# Patient Record
Sex: MALE | Race: WHITE | NOT HISPANIC OR LATINO | Employment: FULL TIME | URBAN - METROPOLITAN AREA
[De-identification: names, ages, dates, MRNs, and addresses within clinical notes are randomized per-mention and may not be internally consistent; named-entity substitution may affect disease eponyms.]

---

## 2017-01-04 ENCOUNTER — HOSPITAL ENCOUNTER (OUTPATIENT)
Dept: RADIOLOGY | Facility: HOSPITAL | Age: 42
Discharge: HOME | End: 2017-01-04
Attending: ORTHOPAEDIC SURGERY
Payer: COMMERCIAL

## 2017-01-04 DIAGNOSIS — M25.559 PAIN IN HIP: ICD-10-CM

## 2017-01-04 DIAGNOSIS — M21.70 ACQUIRED UNEQUAL LIMB LENGTH: ICD-10-CM

## 2017-01-04 PROCEDURE — 73502 X-RAY EXAM HIP UNI 2-3 VIEWS: CPT

## 2017-01-11 ENCOUNTER — TRANSCRIBE ORDERS (OUTPATIENT)
Dept: ADMINISTRATIVE | Facility: HOSPITAL | Age: 42
End: 2017-01-11

## 2017-01-11 DIAGNOSIS — M25.559 ARTHRALGIA OF HIP, UNSPECIFIED LATERALITY: Primary | ICD-10-CM

## 2017-01-19 ENCOUNTER — HOSPITAL ENCOUNTER (OUTPATIENT)
Dept: CT IMAGING | Facility: HOSPITAL | Age: 42
Discharge: HOME | End: 2017-01-19
Attending: ORTHOPAEDIC SURGERY
Payer: COMMERCIAL

## 2017-01-19 DIAGNOSIS — M25.559 PAIN IN HIP: ICD-10-CM

## 2017-01-19 PROCEDURE — 77073 BONE LENGTH STUDIES: CPT

## 2017-01-25 ENCOUNTER — APPOINTMENT (OUTPATIENT)
Dept: PHYSICAL THERAPY | Facility: CLINIC | Age: 42
End: 2017-01-25
Payer: COMMERCIAL

## 2017-01-25 DIAGNOSIS — M21.70 ACQUIRED UNEQUAL LIMB LENGTH: ICD-10-CM

## 2017-01-25 DIAGNOSIS — M25.559 PAIN IN HIP: ICD-10-CM

## 2017-01-25 PROCEDURE — 97162 PT EVAL MOD COMPLEX 30 MIN: CPT

## 2017-01-25 PROCEDURE — 97110 THERAPEUTIC EXERCISES: CPT

## 2017-01-25 PROCEDURE — L3010 FOOT LONGITUDINAL ARCH SUPPO: HCPCS

## 2017-01-30 ENCOUNTER — APPOINTMENT (OUTPATIENT)
Dept: PHYSICAL THERAPY | Facility: CLINIC | Age: 42
End: 2017-01-30
Payer: COMMERCIAL

## 2017-02-01 ENCOUNTER — GENERIC CONVERSION - ENCOUNTER (OUTPATIENT)
Dept: OTHER | Facility: OTHER | Age: 42
End: 2017-02-01

## 2017-02-03 ENCOUNTER — APPOINTMENT (OUTPATIENT)
Dept: PHYSICAL THERAPY | Facility: CLINIC | Age: 42
End: 2017-02-03
Payer: COMMERCIAL

## 2017-02-03 PROCEDURE — 97140 MANUAL THERAPY 1/> REGIONS: CPT

## 2017-02-06 ENCOUNTER — APPOINTMENT (OUTPATIENT)
Dept: PHYSICAL THERAPY | Facility: CLINIC | Age: 42
End: 2017-02-06
Payer: COMMERCIAL

## 2017-02-06 PROCEDURE — 97110 THERAPEUTIC EXERCISES: CPT

## 2017-02-06 PROCEDURE — 97014 ELECTRIC STIMULATION THERAPY: CPT

## 2017-02-06 PROCEDURE — 97140 MANUAL THERAPY 1/> REGIONS: CPT

## 2017-02-06 PROCEDURE — G0283 ELEC STIM OTHER THAN WOUND: HCPCS

## 2017-02-10 ENCOUNTER — APPOINTMENT (OUTPATIENT)
Dept: PHYSICAL THERAPY | Facility: CLINIC | Age: 42
End: 2017-02-10
Payer: COMMERCIAL

## 2017-02-10 PROCEDURE — 97140 MANUAL THERAPY 1/> REGIONS: CPT

## 2017-02-10 PROCEDURE — 97110 THERAPEUTIC EXERCISES: CPT

## 2017-02-10 PROCEDURE — 97014 ELECTRIC STIMULATION THERAPY: CPT

## 2017-02-10 PROCEDURE — G0283 ELEC STIM OTHER THAN WOUND: HCPCS

## 2017-02-13 ENCOUNTER — APPOINTMENT (OUTPATIENT)
Dept: PHYSICAL THERAPY | Facility: CLINIC | Age: 42
End: 2017-02-13
Payer: COMMERCIAL

## 2017-02-17 ENCOUNTER — APPOINTMENT (OUTPATIENT)
Dept: PHYSICAL THERAPY | Facility: CLINIC | Age: 42
End: 2017-02-17
Payer: COMMERCIAL

## 2017-02-17 PROCEDURE — 97110 THERAPEUTIC EXERCISES: CPT

## 2017-02-17 PROCEDURE — 97014 ELECTRIC STIMULATION THERAPY: CPT

## 2017-02-17 PROCEDURE — G0283 ELEC STIM OTHER THAN WOUND: HCPCS

## 2017-02-17 PROCEDURE — 97140 MANUAL THERAPY 1/> REGIONS: CPT

## 2017-02-20 ENCOUNTER — APPOINTMENT (OUTPATIENT)
Dept: PHYSICAL THERAPY | Facility: CLINIC | Age: 42
End: 2017-02-20
Payer: COMMERCIAL

## 2017-02-20 PROCEDURE — 97014 ELECTRIC STIMULATION THERAPY: CPT

## 2017-02-20 PROCEDURE — 97110 THERAPEUTIC EXERCISES: CPT

## 2017-02-20 PROCEDURE — 97140 MANUAL THERAPY 1/> REGIONS: CPT

## 2017-02-20 PROCEDURE — G0283 ELEC STIM OTHER THAN WOUND: HCPCS

## 2017-02-24 ENCOUNTER — APPOINTMENT (OUTPATIENT)
Dept: PHYSICAL THERAPY | Facility: CLINIC | Age: 42
End: 2017-02-24
Payer: COMMERCIAL

## 2017-02-24 PROCEDURE — 97110 THERAPEUTIC EXERCISES: CPT

## 2017-02-24 PROCEDURE — 97014 ELECTRIC STIMULATION THERAPY: CPT

## 2017-02-24 PROCEDURE — 97140 MANUAL THERAPY 1/> REGIONS: CPT

## 2017-02-24 PROCEDURE — G0283 ELEC STIM OTHER THAN WOUND: HCPCS

## 2017-02-27 ENCOUNTER — APPOINTMENT (OUTPATIENT)
Dept: PHYSICAL THERAPY | Facility: CLINIC | Age: 42
End: 2017-02-27
Payer: COMMERCIAL

## 2017-03-03 ENCOUNTER — APPOINTMENT (OUTPATIENT)
Dept: PHYSICAL THERAPY | Facility: CLINIC | Age: 42
End: 2017-03-03
Payer: COMMERCIAL

## 2017-03-03 PROCEDURE — 97110 THERAPEUTIC EXERCISES: CPT

## 2017-03-03 PROCEDURE — 97140 MANUAL THERAPY 1/> REGIONS: CPT

## 2017-03-06 ENCOUNTER — APPOINTMENT (OUTPATIENT)
Dept: PHYSICAL THERAPY | Facility: CLINIC | Age: 42
End: 2017-03-06
Payer: COMMERCIAL

## 2017-03-10 ENCOUNTER — APPOINTMENT (OUTPATIENT)
Dept: PHYSICAL THERAPY | Facility: CLINIC | Age: 42
End: 2017-03-10
Payer: COMMERCIAL

## 2017-03-10 PROCEDURE — 97110 THERAPEUTIC EXERCISES: CPT

## 2017-03-10 PROCEDURE — 97140 MANUAL THERAPY 1/> REGIONS: CPT

## 2017-03-20 ENCOUNTER — APPOINTMENT (OUTPATIENT)
Dept: PHYSICAL THERAPY | Facility: CLINIC | Age: 42
End: 2017-03-20
Payer: COMMERCIAL

## 2017-03-24 ENCOUNTER — APPOINTMENT (OUTPATIENT)
Dept: PHYSICAL THERAPY | Facility: CLINIC | Age: 42
End: 2017-03-24
Payer: COMMERCIAL

## 2017-03-27 ENCOUNTER — APPOINTMENT (OUTPATIENT)
Dept: PHYSICAL THERAPY | Facility: CLINIC | Age: 42
End: 2017-03-27
Payer: COMMERCIAL

## 2017-03-28 ENCOUNTER — ALLSCRIPTS OFFICE VISIT (OUTPATIENT)
Dept: OTHER | Facility: OTHER | Age: 42
End: 2017-03-28

## 2017-03-31 ENCOUNTER — APPOINTMENT (OUTPATIENT)
Dept: PHYSICAL THERAPY | Facility: CLINIC | Age: 42
End: 2017-03-31
Payer: COMMERCIAL

## 2017-08-04 ENCOUNTER — TRANSCRIBE ORDERS (OUTPATIENT)
Dept: LAB | Facility: HOSPITAL | Age: 42
End: 2017-08-04

## 2017-08-04 ENCOUNTER — APPOINTMENT (OUTPATIENT)
Dept: LAB | Facility: HOSPITAL | Age: 42
End: 2017-08-04
Payer: COMMERCIAL

## 2017-08-04 DIAGNOSIS — Z00.8 ENCOUNTER FOR OTHER GENERAL EXAMINATION: ICD-10-CM

## 2017-08-04 DIAGNOSIS — Z00.8 ENCOUNTER FOR OTHER GENERAL EXAMINATION: Primary | ICD-10-CM

## 2017-08-04 LAB
CHOLEST SERPL-MCNC: 156 MG/DL (ref 50–200)
EST. AVERAGE GLUCOSE BLD GHB EST-MCNC: 117 MG/DL
HBA1C MFR BLD: 5.7 % (ref 4.2–6.3)
HDLC SERPL-MCNC: 59 MG/DL (ref 40–60)
LDLC SERPL CALC-MCNC: 84 MG/DL (ref 0–100)
TRIGL SERPL-MCNC: 63 MG/DL

## 2017-08-04 PROCEDURE — 83036 HEMOGLOBIN GLYCOSYLATED A1C: CPT

## 2017-08-04 PROCEDURE — 36415 COLL VENOUS BLD VENIPUNCTURE: CPT

## 2017-08-04 PROCEDURE — 80061 LIPID PANEL: CPT

## 2017-10-04 ENCOUNTER — TRANSCRIBE ORDERS (OUTPATIENT)
Dept: ADMINISTRATIVE | Facility: HOSPITAL | Age: 42
End: 2017-10-04

## 2017-10-04 DIAGNOSIS — E04.1 NONTOXIC UNINODULAR GOITER: Primary | ICD-10-CM

## 2017-10-06 ENCOUNTER — HOSPITAL ENCOUNTER (OUTPATIENT)
Dept: RADIOLOGY | Facility: HOSPITAL | Age: 42
Discharge: HOME | End: 2017-10-06
Attending: SPECIALIST
Payer: COMMERCIAL

## 2017-10-06 DIAGNOSIS — E04.1 NONTOXIC UNINODULAR GOITER: ICD-10-CM

## 2017-10-06 PROCEDURE — 76536 US EXAM OF HEAD AND NECK: CPT

## 2017-10-31 ENCOUNTER — HOSPITAL ENCOUNTER (OUTPATIENT)
Dept: RADIOLOGY | Facility: HOSPITAL | Age: 42
Discharge: HOME | End: 2017-10-31
Payer: COMMERCIAL

## 2017-10-31 DIAGNOSIS — R07.81 PLEURODYNIA: ICD-10-CM

## 2017-10-31 PROCEDURE — 71101 X-RAY EXAM UNILAT RIBS/CHEST: CPT

## 2018-01-12 NOTE — RESULT NOTES
Verified Results  (1) MAGNESIUM [id number] 07:10AM Vidya Browning     Test Name Result Flag Reference   MAGNESIUM 2 3 mg/dL  1 6-2 6     (1) BASIC METABOLIC PROFILE [id number] 07:10AM Vidya Browning     Test Name Result Flag Reference   GLUCOSE,RANDM 90 mg/dL     If the patient is fasting, the ADA then defines impaired fasting glucose as > 100 mg/dL and diabetes as > or equal to 123 mg/dL  SODIUM 138 mmol/L  136-145   POTASSIUM 4 3 mmol/L  3 5-5 3   CHLORIDE 104 mmol/L  100-108   CARBON DIOXIDE 30 mmol/L  21-32   ANION GAP (CALC) 4 mmol/L  4-13   BLOOD UREA NITROGEN 16 mg/dL  5-25   CREATININE 0 96 mg/dL  0 60-1 30   Standardized to IDMS reference method   CALCIUM 8 9 mg/dL  8 3-10 1   eGFR Non-African American      >60 0 ml/min/1 73[facility] Disease Education Program recommendations are as follows:  GFR calculation is accurate only with a steady state creatinine  Chronic Kidney disease less than 60 ml/min/1 73 sq  meters  Kidney failure less than 15 ml/min/1 73 sq  meters  (1) TSH [id number] 07:10AM Vidya Browning     Test Name Result Flag Reference   TSH 1 800 uIU/mL  0 358-3 740   Patients undergoing fluorescein dye angiography may retain small amounts of fluorescein in the body for 48-72 hours post procedure  Samples containing fluorescein can produce falsely depressed TSH values  If the patient had this procedure,a specimen should be resubmitted post fluorescein clearance

## 2018-01-13 NOTE — RESULT NOTES
Verified Results  (1) TISSUE EXAM 20Oct2016 02:55PM Renetta Olivera     Test Name Result Flag Reference   LAB AP CASE REPORT (Report)     Surgical Pathology Report             Case: [id number]                   Authorizing Provider: Ephraim Miller MD       Collected:      10/20/2016 1455        Ordering Location:   [address] [place] [address]   Received:      10/21/2016 [address] Endoscopy                               Pathologist:      Christine Suazo MD                                 Specimens:  A) - Duodenum, r/o celiac                                       B) - Stomach, gastric body r/o h pylori                                C) - Esophagus, random r/o eosinophillic esophagitis   LAB AP FINAL DIAGNOSIS (Report)     A  Duodenum (biopsy):    - Small bowel mucosa with no significant pathologic abnormalities  - No villous atrophy, increased intraepithelial lymphocytes or crypt   hyperplasia to suggest     malabsorptive enteropathy     - No active inflammation, granulomas, organisms, dysplasia or neoplasia   identified  B  Gastric body (biopsy):    - Gastric oxyntic and foveolar mucosa with no significant pathologic   abnormality      - Immunostain for H  pylori (with appropriate positive control) is   negative  - No intestinal metaplasia, dysplasia or neoplasia identified  C  Esophagus (biopsy):    - Predominately squamous mucosa and scant gastric mucosa  - Squamous eosinophils number less than 2 per HPF    - No intestinal metaplasia, dysplasia or neoplasia identified  Electronically signed by Christine Suazo MD on 10/25/2016 at 3:42 PM   LAB AP NOTE      Interpretation performed at [facility], [address] [address]  LAB AP SURGICAL ADDITIONAL INFORMATION (Report)     These tests were developed and their performance characteristics   determined by [facility] or [facility]  They may not be cleared or approved by the [place]   Food and   Drug Administration  The FDA has determined that such clearance or   approval is not necessary  These tests are used for clinical purposes  They should not be regarded as investigational or for research  This   laboratory has been approved by CLIA 88, designated as a high-complexity   laboratory and is qualified to perform these tests  LAB AP GROSS DESCRIPTION (Report)     A  The specimen is received in formalin, labeled with the patient's name   and hospital number, and is designated duodenal biopsy, rule out   celiac  The specimen consists of 2 white to tan soft tissue fragments   each measuring 0 4 centimeters in greatest dimension  Entirely submitted  One cassette  B  The specimen is received in formalin, labeled with the patient's name   and hospital number, and is designated Gastric body biopsy, rule out H    Pylori  The specimen consists of 3 white to tan soft tissue fragments   measuring 0 2, 0 3 and 0 6 centimeters in greatest dimension  Entirely   submitted  One cassette  C  The specimen is received in formalin, labeled with the patient's name   and hospital number, and is designated Esophageal biopsy, rule out   eosinophilic esophagitis  The specimen consists of multiple clear to   white to tan soft tissue fragments measuring in aggregate 0 8 x 0 5 x 0 2   cm  Entirely submitted  One cassette  Note: The estimated total formalin fixation time based upon information   provided by the submitting clinician and the standard processing schedule   is 37 5 hours        ACL   LAB AP CLINICAL INFORMATION None provided

## 2018-01-14 VITALS
HEART RATE: 92 BPM | HEIGHT: 67 IN | SYSTOLIC BLOOD PRESSURE: 110 MMHG | BODY MASS INDEX: 23.09 KG/M2 | OXYGEN SATURATION: 98 % | TEMPERATURE: 96.5 F | WEIGHT: 147.13 LBS | DIASTOLIC BLOOD PRESSURE: 74 MMHG

## 2018-07-26 ENCOUNTER — HOSPITAL ENCOUNTER (EMERGENCY)
Facility: HOSPITAL | Age: 43
Discharge: HOME | End: 2018-07-26
Attending: EMERGENCY MEDICINE
Payer: OTHER MISCELLANEOUS

## 2018-07-26 VITALS
SYSTOLIC BLOOD PRESSURE: 124 MMHG | BODY MASS INDEX: 23.3 KG/M2 | WEIGHT: 145 LBS | OXYGEN SATURATION: 99 % | TEMPERATURE: 96.7 F | DIASTOLIC BLOOD PRESSURE: 72 MMHG | RESPIRATION RATE: 20 BRPM | HEART RATE: 80 BPM | HEIGHT: 66 IN

## 2018-07-26 DIAGNOSIS — T59.91XA INHALATION OF NOXIOUS FUMES, ACCIDENTAL OR UNINTENTIONAL, INITIAL ENCOUNTER: Primary | ICD-10-CM

## 2018-07-26 PROCEDURE — 99283 EMERGENCY DEPT VISIT LOW MDM: CPT

## 2018-07-26 RX ORDER — ACETAMINOPHEN 325 MG/1
975 TABLET ORAL ONCE
Status: COMPLETED | OUTPATIENT
Start: 2018-07-26 | End: 2018-07-26

## 2018-07-26 RX ADMIN — ACETAMINOPHEN 975 MG: 325 TABLET, FILM COATED ORAL at 12:29

## 2018-07-26 NOTE — ED PROVIDER NOTES
History  Chief Complaint   Patient presents with    Headache     patient presents to the ED with c/o headache after a  pipe was opened at work  Patient c/o Headache and nausea  Patient complains of general headache, nausea, dizzy since being exposed to septic fumes for about 1 hour while working in [address] [place] [address] here at [facility] just prior to arrival             Prior to Admission Medications   Prescriptions Last Dose Informant Patient Reported? Taking? Multiple Vitamin (MULTIVITAMIN) tablet   Yes No   Sig: Take 1 tablet by mouth daily  Facility-Administered Medications: None       Past Medical History:   Diagnosis Date    GERD (gastroesophageal reflux disease)     PVC (premature ventricular contraction) resolved    Tenosynovitis of wrist        Past Surgical History:   Procedure Laterality Date    ESOPHAGOGASTRODUODENOSCOPY N/A 10/20/2016    Procedure: ESOPHAGOGASTRODUODENOSCOPY (EGD); Surgeon: Nevin Xavier MD;  [facility]; Service:     VASECTOMY         History reviewed  No pertinent family history  I have reviewed and agree with the history as documented  Social History   Substance Use Topics    Smoking status: Never Smoker    Smokeless tobacco: Never Used    Alcohol use Yes      Comment: occasional wine        Review of Systems   All other systems reviewed and are negative  Physical Exam  Physical Exam   Constitutional: He appears well-developed and well-nourished  HENT:   Mouth/Throat: Oropharynx is clear and moist    Eyes: Conjunctivae and EOM are normal  Pupils are equal, round, and reactive to light  Neck: Normal range of motion  Neck supple  No spinous process tenderness present  Cardiovascular: Normal rate, regular rhythm, normal heart sounds and intact distal pulses  Pulmonary/Chest: Effort normal and breath sounds normal  No respiratory distress  He has no wheezes  Abdominal: Soft  Bowel sounds are normal  He exhibits no distension  There is no tenderness  Musculoskeletal: Normal range of motion  Neurological: He is alert  He has normal strength  No sensory deficit  GCS eye subscore is 4  GCS verbal subscore is 5  GCS motor subscore is 6  Skin: Skin is warm and dry  No rash noted  Psychiatric: He has a normal mood and affect  Nursing note and vitals reviewed        Vital Signs  ED Triage Vitals [07/26/18 1200]   Temperature Pulse Respirations Blood Pressure SpO2   (!) 96 7 °F (35 9 °C) 85 18 141/78 98 %      Temp src Heart Rate Source Patient Position - Orthostatic VS BP Location FiO2 (%)   -- Monitor Sitting Right arm --      Pain Score       3           Vitals:    07/26/18 1200 07/26/18 1246   BP: 141/78 124/72   Pulse: 85 80   Patient Position - Orthostatic VS: Sitting Sitting       Visual Acuity  Visual Acuity      Most Recent Value   L Pupil Size (mm)  3   R Pupil Size (mm)  3          ED Medications  Medications   acetaminophen (TYLENOL) tablet 975 mg (975 mg Oral Given 7/26/18 1229)       Diagnostic Studies  Results Reviewed     None                 No orders to display              Procedures  Procedures       Phone Contacts  ED Phone Contact    ED Course  ED Course as of Jul 26 1653   Thu Jul 26, 2018   1237 Still feels punky but better                                MDM  Number of Diagnoses or Management Options  Inhalation of noxious fumes, accidental or unintentional, initial encounter: new and does not require workup  Patient Progress  Patient progress: improved    CritCare Time    Disposition  Final diagnoses:   Inhalation of noxious fumes, accidental or unintentional, initial encounter     Time reflects when diagnosis was documented in both MDM as applicable and the Disposition within this note     Time User Action Codes Description Comment    7/26/2018 12:38 PM Melvin Ortega Add [T59 [id number]] Inhalation of noxious fumes, accidental or unintentional, initial encounter       ED Disposition     ED Disposition Condition Comment    Discharge  Dearing Sor [id number] [id number] discharge to home/self care  Condition at discharge: Good        Follow-up Information    None         Discharge Medication List as of 7/26/2018 12:40 PM      CONTINUE these medications which have NOT CHANGED    Details   Multiple Vitamin (MULTIVITAMIN) tablet Take 1 tablet by mouth daily  , Until Discontinued, Historical Med           No discharge procedures on file      ED Provider  Electronically Signed by           Linda Vieira DO  07/26/18 6641

## 2018-07-26 NOTE — DISCHARGE INSTRUCTIONS
Acute Headache   WHAT YOU NEED TO KNOW:   What is an acute headache? An acute headache is pain or discomfort that starts suddenly and gets worse quickly  You may have an acute headache only when you feel stress or eat certain foods  Other acute headache pain can happen every day, and sometimes several times a day  What are the most common types of acute headache? · Tension headache  is the most common type of headache  These headaches typically occur in the late afternoon and go away by evening  The pain is usually mild or moderate  You may have problems tolerating bright light or loud noise  The pain is usually across the forehead or in the back of the head, often only on one side  These headaches may occur every day  · Migraine headaches  cause moderate or severe pain  The headache generally lasts from 1 to 3 days and tends to come back  Pain is usually on only one side, but it may change sides  Migraines often occur in the temple, the back of the head, or behind the eye  The pain may throb or be sharp and steady  · A migraine with aura  means you see or feel something before a migraine  You may see a small spot surrounded by bright zigzag lines  Other signs or symptoms may follow the aura  · Cluster headache  pain is usually only on one side  It often causes severe pain, and can last for 30 minutes to 2 hours  These headaches may occur 1 or 2 times each day, more often at night  The pain may wake you  What causes acute headaches? The cause of your headache may not be known   The following conditions can trigger a headache:  · Stress or tension, hours or even days after stressful events    · Fatigue, a lack of sleep or changes in your usual sleep pattern, or a nap during the day    · Menstruation, especially after pregnancy, or use of birth control pills or hormone replacement therapy    · Food such as cured meats, artificial sweeteners, alcohol, dark chocolate, and MSG     · Suddenly not having caffeine if you usually have larger amounts    · A medical problem, such as an infection, tooth pain, neck or sinus pain, thyroid problems, or a tumor    · A head injury  How is the type of acute headache diagnosed and treated? Your healthcare provider will ask you to describe your pain and rate it on a scale from 1 to 10  Tell the provider how often you have headaches and how long they last  Also describe any other symptoms you have along with headaches, such as dizziness or blurred vision  · Medicines  may be given to manage or prevent headaches  The medicine will depend on the type of acute headache you have  Do not wait until the pain is severe before you take your medicine  You may be able to take over-the-counter pain medicines as needed  Examples include NSAIDs and acetaminophen  Ask your healthcare provider which medicine is right for you  Ask how much to take and when to take it  Follow directions  These medicines can cause stomach bleeding or kidney or liver damage if not taken correctly  · Biofeedback  may be used to help you manage stress  Electrodes (wires) are placed on your body and attached to a monitor  You will learn how to change stress reactions  For example, you learn to slow your heart rate when you become upset  · Cognitive behavior therapy,  or stress management, may be used with other therapies to prevent headaches  What can I do to manage my symptoms? · Apply heat or ice  on the headache area  Use a heat or ice pack  For an ice pack, you can also put crushed ice in a plastic bag  Cover the pack or bag with a towel before you apply it to your skin  Ice and heat both help decrease pain, and heat also helps decrease muscle spasms  Apply heat for 20 to 30 minutes every 2 hours  Apply ice for 15 to 20 minutes every hour  Apply heat or ice for as long and for as many days as directed  You may alternate heat and ice  · Relax your muscles    Lie down in a comfortable position and close your eyes  Relax your muscles slowly  Start at your toes and work your way up your body  · Keep a record of your headaches  Write down when your headaches start and stop  Include your symptoms and what you were doing when the headache began  Record what you ate or drank for 24 hours before the headache started  Describe the pain and where it hurts  Keep track of what you did to treat your headache and if it worked  What can I do to prevent an acute headache? · Avoid anything that triggers an acute headache  Examples include exposure to chemicals, going to high altitude, or not getting enough sleep  Create a regular sleep routine  Go to sleep at the same time and wake up at the same time each day  Do not use electronic devices before bedtime  These may trigger a headache or prevent you from sleeping well  · Do not smoke  Nicotine and other chemicals in cigarettes and cigars can trigger an acute headache or make it worse  Ask your healthcare provider for information if you currently smoke and need help to quit  E-cigarettes or smokeless tobacco still contain nicotine  Talk to your healthcare provider before you use these products  · Limit alcohol as directed  Alcohol can trigger an acute headache or make it worse  If you have cluster headaches, do not drink alcohol during an episode  For other types of headaches, ask your healthcare provider if it is safe for you to drink alcohol  Ask how much is safe for you to drink, and how often  · Exercise as directed  Exercise can reduce tension and help with headache pain  Aim for 30 minutes of physical activity on most days of the week  Your healthcare provider can help you create an exercise plan  · Eat a variety of healthy foods  Healthy foods include fruits, vegetables, low-fat dairy products, lean meats, fish, whole grains, and cooked beans   Your healthcare provider or dietitian can help you create meals plans if you need to avoid foods that trigger headaches  When should I seek immediate care? · You have severe pain  · You have numbness or weakness on one side of your face or body  · You have a headache that occurs after a blow to the head, a fall, or other trauma  · You have a headache, are forgetful or confused, or have trouble speaking  · You have a headache, stiff neck, and a fever  When should I contact my healthcare provider? · You have a constant headache and are vomiting  · You have a headache each day that does not get better, even after treatment  · You have changes in your headaches, or new symptoms that occur when you have a headache  · You have questions or concerns about your condition or care  CARE AGREEMENT:   You have the right to help plan your care  Learn about your health condition and how it may be treated  Discuss treatment options with your caregivers to decide what care you want to receive  You always have the right to refuse treatment  The above information is an  only  It is not intended as medical advice for individual conditions or treatments  Talk to your doctor, nurse or pharmacist before following any medical regimen to see if it is safe and effective for you  © 2017 [address] [facility] Information is for End User's use only and may not be sold, redistributed or otherwise used for commercial purposes  All illustrations and images included in CareNotes® are the copyrighted property of [employer]  or Augie Leal

## 2019-01-17 ENCOUNTER — TELEPHONE (OUTPATIENT)
Dept: FAMILY MEDICINE CLINIC | Facility: HOSPITAL | Age: 44
End: 2019-01-17

## 2019-01-18 DIAGNOSIS — Z13.6 SCREENING FOR CARDIOVASCULAR CONDITION: ICD-10-CM

## 2019-01-18 DIAGNOSIS — Z13.1 SCREENING FOR DIABETES MELLITUS: Primary | ICD-10-CM

## 2019-02-01 ENCOUNTER — TRANSCRIBE ORDERS (OUTPATIENT)
Dept: LAB | Facility: HOSPITAL | Age: 44
End: 2019-02-01

## 2019-02-01 ENCOUNTER — LAB (OUTPATIENT)
Dept: LAB | Facility: HOSPITAL | Age: 44
End: 2019-02-01
Attending: INTERNAL MEDICINE
Payer: COMMERCIAL

## 2019-02-01 DIAGNOSIS — Z13.6 SCREENING FOR CARDIOVASCULAR CONDITION: ICD-10-CM

## 2019-02-01 DIAGNOSIS — Z13.1 SCREENING FOR DIABETES MELLITUS: ICD-10-CM

## 2019-02-01 LAB
ALBUMIN SERPL BCP-MCNC: 3.9 G/DL (ref 3.5–5)
ALP SERPL-CCNC: 76 U/L (ref 46–116)
ALT SERPL W P-5'-P-CCNC: 26 U/L (ref 12–78)
ANION GAP SERPL CALCULATED.3IONS-SCNC: 4 MMOL/L (ref 4–13)
AST SERPL W P-5'-P-CCNC: 16 U/L (ref 5–45)
BILIRUB SERPL-MCNC: 0.57 MG/DL (ref 0.2–1)
BUN SERPL-MCNC: 14 MG/DL (ref 5–25)
CALCIUM SERPL-MCNC: 8.8 MG/DL (ref 8.3–10.1)
CHLORIDE SERPL-SCNC: 104 MMOL/L (ref 100–108)
CHOLEST SERPL-MCNC: 145 MG/DL (ref 50–200)
CO2 SERPL-SCNC: 28 MMOL/L (ref 21–32)
CREAT SERPL-MCNC: 0.94 MG/DL (ref 0.6–1.3)
GFR SERPL CREATININE-BSD FRML MDRD: 100 ML/MIN/1.73SQ M
GLUCOSE P FAST SERPL-MCNC: 88 MG/DL (ref 65–99)
HDLC SERPL-MCNC: 50 MG/DL (ref 40–60)
LDLC SERPL CALC-MCNC: 83 MG/DL (ref 0–100)
NONHDLC SERPL-MCNC: 95 MG/DL
POTASSIUM SERPL-SCNC: 4 MMOL/L (ref 3.5–5.3)
PROT SERPL-MCNC: 7.6 G/DL (ref 6.4–8.2)
SODIUM SERPL-SCNC: 136 MMOL/L (ref 136–145)
TRIGL SERPL-MCNC: 58 MG/DL

## 2019-02-01 PROCEDURE — 80053 COMPREHEN METABOLIC PANEL: CPT

## 2019-02-01 PROCEDURE — 80061 LIPID PANEL: CPT

## 2019-02-01 PROCEDURE — 36415 COLL VENOUS BLD VENIPUNCTURE: CPT

## 2019-02-14 ENCOUNTER — OFFICE VISIT (OUTPATIENT)
Dept: FAMILY MEDICINE CLINIC | Facility: HOSPITAL | Age: 44
End: 2019-02-14
Payer: COMMERCIAL

## 2019-02-14 VITALS
DIASTOLIC BLOOD PRESSURE: 74 MMHG | HEART RATE: 95 BPM | BODY MASS INDEX: 24.75 KG/M2 | OXYGEN SATURATION: 97 % | WEIGHT: 154 LBS | HEIGHT: 66 IN | SYSTOLIC BLOOD PRESSURE: 110 MMHG

## 2019-02-14 DIAGNOSIS — E04.1 THYROID NODULE: ICD-10-CM

## 2019-02-14 DIAGNOSIS — Z00.00 ANNUAL PHYSICAL EXAM: Primary | ICD-10-CM

## 2019-02-14 DIAGNOSIS — K21.9 GASTROESOPHAGEAL REFLUX DISEASE WITHOUT ESOPHAGITIS: ICD-10-CM

## 2019-02-14 PROBLEM — M21.70 LEG LENGTH DISCREPANCY: Status: ACTIVE | Noted: 2017-01-19

## 2019-02-14 PROCEDURE — 99396 PREV VISIT EST AGE 40-64: CPT | Performed by: INTERNAL MEDICINE

## 2019-02-14 RX ORDER — RANITIDINE 150 MG/1
1 TABLET ORAL 2 TIMES DAILY
COMMUNITY
Start: 2017-03-28 | End: 2020-01-29 | Stop reason: ALTCHOICE

## 2019-02-14 NOTE — PATIENT INSTRUCTIONS

## 2019-02-14 NOTE — PROGRESS NOTES
ADULT ANNUAL PHYSICAL  [facility]    NAME: Arabella Levi  AGE: 41 y o  SEX: male  : 1975     DATE: 2019     Assessment and Plan:     Problem List Items Addressed This Visit        Digestive    Gastroesophageal reflux disease without esophagitis     Will check cbc           Relevant Medications    ranitidine (ZANTAC) 150 mg tablet    Other Relevant Orders    Iron Saturation %    CBC       Endocrine    Thyroid nodule    Relevant Orders    TSH, 3rd generation with Free T4 reflex      Other Visit Diagnoses     Annual physical exam    -  Primary          Health maintenance and preventative care screenings were discussed with patient today  Appropriate education was printed on patient's after visit summary  · Discussed risks/benefits of screening for high cholesterol and diabetes  Patient is up-to-date with their preventive screenings  · Immunizations were reviewed: patient is up-to-date with his immunizations  Counseling:  · Dental Health: discussed importance of regular tooth brushing, flossing, and dental visits  No follow-ups on file  Chief Complaint:     Chief Complaint   Patient presents with    Annual Exam      History of Present Illness:     Adult Annual Physical   Patient here for a comprehensive physical exam  The patient reports feeling lightheaded after a large meal and uncomfortable feelinging in the legs at night twice a week that's better with movement  Diet and Physical Activity  · Diet/Nutrition: well balanced diet  · Weight concerns: none, patient's BMI is between 18 5-24 9  · Exercise: vigorous cardiovascular exercise        Depression Screening  PHQ-9 Depression Screening    PHQ-9:    Frequency of the following problems over the past two weeks:       Little interest or pleasure in doing things:  0 - not at all  Feeling down, depressed, or hopeless:  0 - not at all  PHQ-2 Score:  0       General Health  · Sleep: sleeps well  · Hearing: normal - bilateral   · Vision: wears glasses  · Dental: regular dental visits   Health  · Erectile dysfunction: no        Review of Systems:     Review of Systems   Constitutional: Negative for fever  HENT: Negative for congestion  Eyes: Negative for visual disturbance  Respiratory: Negative for cough and shortness of breath  Cardiovascular: Negative for chest pain, palpitations and leg swelling  Gastrointestinal: Negative for abdominal pain, blood in stool and diarrhea  Endocrine: Negative for polydipsia and polyphagia  Genitourinary: Negative for difficulty urinating and dysuria  Musculoskeletal: Negative for joint swelling, myalgias and neck stiffness  Skin: Negative for rash  Neurological: Positive for light-headedness  Negative for weakness, numbness and headaches  Hematological: Negative for adenopathy  Psychiatric/Behavioral: Negative for dysphoric mood  All other systems reviewed and are negative  Past Medical History:     Past Medical History:   Diagnosis Date    GERD (gastroesophageal reflux disease)     PVC (premature ventricular contraction) resolved    Tenosynovitis of wrist       Past Surgical History:     Past Surgical History:   Procedure Laterality Date    COLONOSCOPY      ESOPHAGOGASTRODUODENOSCOPY N/A 10/20/2016    Procedure: ESOPHAGOGASTRODUODENOSCOPY (EGD); Surgeon: Jose Valencia MD;  Location: [facility];   Service:     FEMUR SURGERY      Femur repair    VASECTOMY        Social History:     Social History     Socioeconomic History    Marital status: /Civil Union     Spouse name: None    Number of children: None    Years of education: None    Highest education level: None   Occupational History    None   Social Needs    Financial resource strain: None    Food insecurity:     Worry: None     Inability: None    Transportation needs:     Medical: None     Non-medical: None   Tobacco Use    Smoking status: Never Smoker    Smokeless tobacco: Never Used   Substance and Sexual Activity    Alcohol use: Yes     Comment: occasional wine    Drug use: No    Sexual activity: None   Lifestyle    Physical activity:     Days per week: None     Minutes per session: None    Stress: None   Relationships    Social connections:     Talks on phone: None     Gets together: None     Attends Spiritism service: None     Active member of club or organization: None     Attends meetings of clubs or organizations: None     Relationship status: None    Intimate partner violence:     Fear of current or ex partner: None     Emotionally abused: None     Physically abused: None     Forced sexual activity: None   Other Topics Concern    None   Social History Narrative    No alcohol use - As per [employer]     Wears seatbelt    Regular dental care    Limited exercise    No living will      Family History:     Family History   Problem Relation Age of Onset    Leukemia Mother     Corrales's esophagus Father     Coronary artery disease Father     Diabetes Father     Heart disease Father       Current Medications:     Current Outpatient Medications   Medication Sig Dispense Refill    Multiple Vitamin (MULTIVITAMIN) tablet Take 1 tablet by mouth daily   ranitidine (ZANTAC) 150 mg tablet Take 1 tablet by mouth 2 (two) times a day       No current facility-administered medications for this visit  Allergies: Allergies   Allergen Reactions    Amoxicillin-Pot Clavulanate GI Intolerance      Objective:     /74   Pulse 95   Ht 5' 6" (1 676 m)   Wt 69 9 kg (154 lb)   SpO2 97%   BMI 24 86 kg/m²     Physical Exam   Constitutional: He is oriented to person, place, and time  He appears well-developed and well-nourished  HENT:   Head: Normocephalic  Eyes: Conjunctivae are normal    Neck: Neck supple  Cardiovascular: Normal rate and regular rhythm  Pulmonary/Chest: No respiratory distress   He has no wheezes  He has no rales  Abdominal: There is no tenderness  Musculoskeletal: He exhibits no tenderness  Lymphadenopathy:     He has no cervical adenopathy  Neurological: He is alert and oriented to person, place, and time  No cranial nerve deficit  Skin: Skin is warm and dry  No erythema  Psychiatric: He has a normal mood and affect          Health Maintenance:     Health Maintenance   Topic Date Due    Depression Screening PHQ  1975    INFLUENZA VACCINE  07/01/2018    BMI: Adult  07/26/2019    DTaP,Tdap,and Td Vaccines (2 - Td) 04/28/2026    HEPATITIS B VACCINES  Aged Out     Immunization History   Administered Date(s) Administered    Tdap 04/28/2016       Mason Hardy MD  [address] [facility]

## 2019-04-16 DIAGNOSIS — M75.41 SUBACROMIAL IMPINGEMENT OF RIGHT SHOULDER: Primary | ICD-10-CM

## 2019-06-05 ENCOUNTER — TRANSCRIBE ORDERS (OUTPATIENT)
Dept: LAB | Facility: HOSPITAL | Age: 44
End: 2019-06-05

## 2019-06-05 ENCOUNTER — APPOINTMENT (OUTPATIENT)
Dept: LAB | Facility: HOSPITAL | Age: 44
End: 2019-06-05
Payer: COMMERCIAL

## 2019-06-05 ENCOUNTER — LAB (OUTPATIENT)
Dept: LAB | Facility: HOSPITAL | Age: 44
End: 2019-06-05
Attending: INTERNAL MEDICINE
Payer: COMMERCIAL

## 2019-06-05 DIAGNOSIS — Z00.8 HEALTH EXAMINATION IN POPULATION SURVEY: Primary | ICD-10-CM

## 2019-06-05 DIAGNOSIS — Z00.8 HEALTH EXAMINATION IN POPULATION SURVEY: ICD-10-CM

## 2019-06-05 DIAGNOSIS — E04.1 THYROID NODULE: ICD-10-CM

## 2019-06-05 DIAGNOSIS — K21.9 GASTROESOPHAGEAL REFLUX DISEASE WITHOUT ESOPHAGITIS: ICD-10-CM

## 2019-06-05 LAB
CHOLEST SERPL-MCNC: 149 MG/DL (ref 50–200)
ERYTHROCYTE [DISTWIDTH] IN BLOOD BY AUTOMATED COUNT: 15.8 % (ref 11.6–15.1)
EST. AVERAGE GLUCOSE BLD GHB EST-MCNC: 114 MG/DL
HBA1C MFR BLD: 5.6 % (ref 4.2–6.3)
HCT VFR BLD AUTO: 44 % (ref 36.5–49.3)
HDLC SERPL-MCNC: 55 MG/DL (ref 40–60)
HGB BLD-MCNC: 13.1 G/DL (ref 12–17)
IRON SATN MFR SERPL: 20 %
IRON SERPL-MCNC: 58 UG/DL (ref 65–175)
LDLC SERPL CALC-MCNC: 83 MG/DL (ref 0–100)
MCH RBC QN AUTO: 21.3 PG (ref 26.8–34.3)
MCHC RBC AUTO-ENTMCNC: 29.8 G/DL (ref 31.4–37.4)
MCV RBC AUTO: 72 FL (ref 82–98)
NONHDLC SERPL-MCNC: 94 MG/DL
PLATELET # BLD AUTO: 253 THOUSANDS/UL (ref 149–390)
PMV BLD AUTO: 10.2 FL (ref 8.9–12.7)
RBC # BLD AUTO: 6.15 MILLION/UL (ref 3.88–5.62)
TIBC SERPL-MCNC: 290 UG/DL (ref 250–450)
TRIGL SERPL-MCNC: 54 MG/DL
TSH SERPL DL<=0.05 MIU/L-ACNC: 1.61 UIU/ML (ref 0.36–3.74)
WBC # BLD AUTO: 7.33 THOUSAND/UL (ref 4.31–10.16)

## 2019-06-05 PROCEDURE — 83036 HEMOGLOBIN GLYCOSYLATED A1C: CPT

## 2019-06-05 PROCEDURE — 83540 ASSAY OF IRON: CPT

## 2019-06-05 PROCEDURE — 36415 COLL VENOUS BLD VENIPUNCTURE: CPT

## 2019-06-05 PROCEDURE — 84443 ASSAY THYROID STIM HORMONE: CPT

## 2019-06-05 PROCEDURE — 80061 LIPID PANEL: CPT

## 2019-06-05 PROCEDURE — 83550 IRON BINDING TEST: CPT

## 2019-06-05 PROCEDURE — 85027 COMPLETE CBC AUTOMATED: CPT

## 2019-08-01 ENCOUNTER — HOSPITAL ENCOUNTER (EMERGENCY)
Facility: HOSPITAL | Age: 44
Discharge: HOME | End: 2019-08-01
Attending: EMERGENCY MEDICINE | Admitting: EMERGENCY MEDICINE
Payer: COMMERCIAL

## 2019-08-01 ENCOUNTER — APPOINTMENT (EMERGENCY)
Dept: CT IMAGING | Facility: HOSPITAL | Age: 44
End: 2019-08-01
Payer: COMMERCIAL

## 2019-08-01 VITALS
SYSTOLIC BLOOD PRESSURE: 105 MMHG | TEMPERATURE: 98 F | OXYGEN SATURATION: 100 % | HEIGHT: 66 IN | RESPIRATION RATE: 20 BRPM | BODY MASS INDEX: 24.77 KG/M2 | HEART RATE: 81 BPM | WEIGHT: 154.1 LBS | DIASTOLIC BLOOD PRESSURE: 67 MMHG

## 2019-08-01 DIAGNOSIS — R42 DIZZINESS: Primary | ICD-10-CM

## 2019-08-01 DIAGNOSIS — M54.2 NECK PAIN: ICD-10-CM

## 2019-08-01 DIAGNOSIS — R51.9 HEADACHE: ICD-10-CM

## 2019-08-01 LAB
ANION GAP BLD CALC-SCNC: 14 MMOL/L (ref 4–13)
ANION GAP SERPL CALCULATED.3IONS-SCNC: 8 MMOL/L (ref 4–13)
ATRIAL RATE: 74 BPM
BASOPHILS # BLD AUTO: 0.04 THOUSANDS/ΜL (ref 0–0.1)
BASOPHILS NFR BLD AUTO: 1 % (ref 0–1)
BUN BLD-MCNC: 10 MG/DL (ref 5–25)
BUN SERPL-MCNC: 12 MG/DL (ref 5–25)
CA-I BLD-SCNC: 1.17 MMOL/L (ref 1.12–1.32)
CALCIUM SERPL-MCNC: 8.9 MG/DL (ref 8.3–10.1)
CHLORIDE BLD-SCNC: 100 MMOL/L (ref 100–108)
CHLORIDE SERPL-SCNC: 102 MMOL/L (ref 100–108)
CO2 SERPL-SCNC: 29 MMOL/L (ref 21–32)
CREAT BLD-MCNC: 0.8 MG/DL (ref 0.6–1.3)
CREAT SERPL-MCNC: 0.84 MG/DL (ref 0.6–1.3)
EOSINOPHIL # BLD AUTO: 0.08 THOUSAND/ΜL (ref 0–0.61)
EOSINOPHIL NFR BLD AUTO: 1 % (ref 0–6)
ERYTHROCYTE [DISTWIDTH] IN BLOOD BY AUTOMATED COUNT: 15.4 % (ref 11.6–15.1)
GFR SERPL CREATININE-BSD FRML MDRD: 108 ML/MIN/1.73SQ M
GFR SERPL CREATININE-BSD FRML MDRD: 110 ML/MIN/1.73SQ M
GLUCOSE SERPL-MCNC: 92 MG/DL (ref 65–140)
GLUCOSE SERPL-MCNC: 93 MG/DL (ref 65–140)
HCT VFR BLD AUTO: 42.4 % (ref 36.5–49.3)
HCT VFR BLD CALC: 42 % (ref 36.5–49.3)
HGB BLD-MCNC: 13.1 G/DL (ref 12–17)
HGB BLDA-MCNC: 14.3 G/DL (ref 12–17)
IMM GRANULOCYTES # BLD AUTO: 0.02 THOUSAND/UL (ref 0–0.2)
IMM GRANULOCYTES NFR BLD AUTO: 0 % (ref 0–2)
LYMPHOCYTES # BLD AUTO: 2.25 THOUSANDS/ΜL (ref 0.6–4.47)
LYMPHOCYTES NFR BLD AUTO: 26 % (ref 14–44)
MCH RBC QN AUTO: 21.7 PG (ref 26.8–34.3)
MCHC RBC AUTO-ENTMCNC: 30.9 G/DL (ref 31.4–37.4)
MCV RBC AUTO: 70 FL (ref 82–98)
MONOCYTES # BLD AUTO: 0.7 THOUSAND/ΜL (ref 0.17–1.22)
MONOCYTES NFR BLD AUTO: 8 % (ref 4–12)
NEUTROPHILS # BLD AUTO: 5.45 THOUSANDS/ΜL (ref 1.85–7.62)
NEUTS SEG NFR BLD AUTO: 64 % (ref 43–75)
NRBC BLD AUTO-RTO: 0 /100 WBCS
P AXIS: 93 DEGREES
PCO2 BLD: 30 MMOL/L (ref 21–32)
PLATELET # BLD AUTO: 246 THOUSANDS/UL (ref 149–390)
PMV BLD AUTO: 10.2 FL (ref 8.9–12.7)
POTASSIUM BLD-SCNC: 3.9 MMOL/L (ref 3.5–5.3)
POTASSIUM SERPL-SCNC: 3.8 MMOL/L (ref 3.5–5.3)
PR INTERVAL: 132 MS
QRS AXIS: 85 DEGREES
QRSD INTERVAL: 88 MS
QT INTERVAL: 402 MS
QTC INTERVAL: 446 MS
RBC # BLD AUTO: 6.05 MILLION/UL (ref 3.88–5.62)
SODIUM BLD-SCNC: 139 MMOL/L (ref 136–145)
SODIUM SERPL-SCNC: 139 MMOL/L (ref 136–145)
SPECIMEN SOURCE: ABNORMAL
T WAVE AXIS: 52 DEGREES
TROPONIN I SERPL-MCNC: <0.02 NG/ML
VENTRICULAR RATE: 74 BPM
WBC # BLD AUTO: 8.54 THOUSAND/UL (ref 4.31–10.16)

## 2019-08-01 PROCEDURE — 84484 ASSAY OF TROPONIN QUANT: CPT | Performed by: EMERGENCY MEDICINE

## 2019-08-01 PROCEDURE — 85025 COMPLETE CBC W/AUTO DIFF WBC: CPT | Performed by: EMERGENCY MEDICINE

## 2019-08-01 PROCEDURE — 80048 BASIC METABOLIC PNL TOTAL CA: CPT | Performed by: EMERGENCY MEDICINE

## 2019-08-01 PROCEDURE — 99284 EMERGENCY DEPT VISIT MOD MDM: CPT | Performed by: EMERGENCY MEDICINE

## 2019-08-01 PROCEDURE — 99285 EMERGENCY DEPT VISIT HI MDM: CPT

## 2019-08-01 PROCEDURE — 96375 TX/PRO/DX INJ NEW DRUG ADDON: CPT

## 2019-08-01 PROCEDURE — 93005 ELECTROCARDIOGRAM TRACING: CPT

## 2019-08-01 PROCEDURE — 85014 HEMATOCRIT: CPT

## 2019-08-01 PROCEDURE — 93010 ELECTROCARDIOGRAM REPORT: CPT | Performed by: INTERNAL MEDICINE

## 2019-08-01 PROCEDURE — 70496 CT ANGIOGRAPHY HEAD: CPT

## 2019-08-01 PROCEDURE — 70498 CT ANGIOGRAPHY NECK: CPT

## 2019-08-01 PROCEDURE — 96374 THER/PROPH/DIAG INJ IV PUSH: CPT

## 2019-08-01 PROCEDURE — 80047 BASIC METABLC PNL IONIZED CA: CPT

## 2019-08-01 PROCEDURE — 36415 COLL VENOUS BLD VENIPUNCTURE: CPT | Performed by: EMERGENCY MEDICINE

## 2019-08-01 PROCEDURE — 96361 HYDRATE IV INFUSION ADD-ON: CPT

## 2019-08-01 RX ORDER — ACETAMINOPHEN 325 MG/1
975 TABLET ORAL ONCE
Status: COMPLETED | OUTPATIENT
Start: 2019-08-01 | End: 2019-08-01

## 2019-08-01 RX ORDER — METOCLOPRAMIDE HYDROCHLORIDE 5 MG/ML
10 INJECTION INTRAMUSCULAR; INTRAVENOUS ONCE
Status: COMPLETED | OUTPATIENT
Start: 2019-08-01 | End: 2019-08-01

## 2019-08-01 RX ORDER — KETOROLAC TROMETHAMINE 30 MG/ML
30 INJECTION, SOLUTION INTRAMUSCULAR; INTRAVENOUS ONCE
Status: COMPLETED | OUTPATIENT
Start: 2019-08-01 | End: 2019-08-01

## 2019-08-01 RX ADMIN — IOHEXOL 85 ML: 350 INJECTION, SOLUTION INTRAVENOUS at 13:27

## 2019-08-01 RX ADMIN — METOCLOPRAMIDE 10 MG: 5 INJECTION, SOLUTION INTRAMUSCULAR; INTRAVENOUS at 12:50

## 2019-08-01 RX ADMIN — SODIUM CHLORIDE 1000 ML: 0.9 INJECTION, SOLUTION INTRAVENOUS at 12:46

## 2019-08-01 RX ADMIN — ACETAMINOPHEN 975 MG: 325 TABLET, FILM COATED ORAL at 12:55

## 2019-08-01 RX ADMIN — KETOROLAC TROMETHAMINE 30 MG: 30 INJECTION, SOLUTION INTRAMUSCULAR; INTRAVENOUS at 13:51

## 2019-08-01 NOTE — ED PROVIDER NOTES
History  Chief Complaint   Patient presents with    Dizziness     pt presents to ER after being in surgery operating, became dizzy, sweaty, had sudden pain in his left caratoid artery and the base of his skull  HPI     44-year-old male with history of PVCs presents with an episode while he was operating today he became sweaty dizzy and had pain in the left side of his neck  Patient states he does have a history of migraines  He began with a dull achy posterior headache that came on slowly currently pain is a 3/10  Headache began at 8:00 a m   No sudden onset  Patient states this is different than his typical migraine  Patient states that he has pain it more in his posterior head, and his left side of his neck  Patient also pain diaphoretic and lightheaded  Patient states that he was operating and the OR staff saw that he was becoming more pale  He presents for evaluation for this  Currently the pain is as stated above  Patient also states that it hurts when he palpates the left side of his neck  No overlying skin changes no rash  No trauma reported  Patient is not on blood thinners  Patient has limited past medical history  Patient denies fever chills rigors patient denies motor weakness numbness or tingling  Patient states he feels off  Past medical history reviewed  Past surgical history reviewed  Patient takes a multivitamin and Zantac for GERD  Family history reviewed  Patient denies visual changes  Patient wears glasses  Patient denies floaters  Prior to Admission Medications   Prescriptions Last Dose Informant Patient Reported? Taking? Multiple Vitamin (MULTIVITAMIN) tablet   Yes No   Sig: Take 1 tablet by mouth daily     ranitidine (ZANTAC) 150 mg tablet   Yes No   Sig: Take 1 tablet by mouth 2 (two) times a day      Facility-Administered Medications: None       Past Medical History:   Diagnosis Date    GERD (gastroesophageal reflux disease)     PVC (premature ventricular contraction) resolved    Tenosynovitis of wrist        Past Surgical History:   Procedure Laterality Date    COLONOSCOPY      ESOPHAGOGASTRODUODENOSCOPY N/A 10/20/2016    Procedure: ESOPHAGOGASTRODUODENOSCOPY (EGD); Surgeon: Rk Castillo MD;  [facility]: [facility]; Service:     FEMUR SURGERY      Femur repair    VASECTOMY         Family History   Problem Relation Age of Onset    Leukemia Mother     Corrales's esophagus Father     Coronary artery disease Father     Diabetes Father     Heart disease Father      I have reviewed and agree with the history as documented  Social History     Tobacco Use    Smoking status: Never Smoker    Smokeless tobacco: Never Used   Substance Use Topics    Alcohol use: Yes     Comment: occasional wine    Drug use: No        Review of Systems   Constitutional: Negative for activity change, appetite change, chills, diaphoresis, fatigue, fever and unexpected weight change  HENT: Negative for congestion, dental problem, drooling, ear discharge, ear pain, facial swelling, hearing loss, mouth sores, nosebleeds, postnasal drip, rhinorrhea, sinus pressure, sinus pain, sneezing, sore throat, tinnitus, trouble swallowing and voice change  Eyes: Negative for photophobia, pain, redness, itching and visual disturbance  Respiratory: Negative for cough, chest tightness, shortness of breath, wheezing and stridor  Cardiovascular: Negative for chest pain, palpitations and leg swelling  Gastrointestinal: Negative for abdominal distention, abdominal pain, anal bleeding, blood in stool, constipation, diarrhea, nausea, rectal pain and vomiting  Endocrine: Negative for polydipsia and polyuria  Genitourinary: Negative for decreased urine volume, difficulty urinating, dysuria, enuresis, flank pain, frequency, hematuria and urgency  Musculoskeletal: Positive for neck pain  Negative for arthralgias, back pain, gait problem, joint swelling, myalgias and neck stiffness  Skin: Negative for rash and wound  Allergic/Immunologic: Negative for immunocompromised state  Neurological: Positive for light-headedness and headaches  Negative for dizziness, seizures, syncope, facial asymmetry, speech difficulty, weakness and numbness  Hematological: Negative for adenopathy  Psychiatric/Behavioral: Negative for agitation, behavioral problems, confusion, dysphoric mood, hallucinations, self-injury and suicidal ideas  The patient is not nervous/anxious and is not hyperactive  Physical Exam  Physical Exam   Constitutional: He is oriented to person, place, and time  He appears well-developed and well-nourished  No distress  HENT:   Head: Normocephalic and atraumatic  Right Ear: External ear normal    Left Ear: External ear normal    Nose: Nose normal    Mouth/Throat: Oropharynx is clear and moist  No oropharyngeal exudate  Eyes: Pupils are equal, round, and reactive to light  Conjunctivae and EOM are normal  Right eye exhibits no discharge  Left eye exhibits no discharge  No scleral icterus  Neck: Normal range of motion  Neck supple  No JVD present  No tracheal deviation present  No thyromegaly present  Cardiovascular: Normal rate, regular rhythm, normal heart sounds and intact distal pulses  No murmur heard  Pulmonary/Chest: Effort normal and breath sounds normal  No stridor  No respiratory distress  He has no wheezes  He has no rales  Abdominal: Soft  Bowel sounds are normal  He exhibits no distension and no mass  There is no tenderness  There is no rebound and no guarding  Musculoskeletal: Normal range of motion  He exhibits no edema, tenderness or deformity  Lymphadenopathy:     He has no cervical adenopathy  Neurological: He is alert and oriented to person, place, and time  No cranial nerve deficit  He exhibits normal muscle tone  Coordination normal  GCS eye subscore is 4  GCS verbal subscore is 5  GCS motor subscore is 6     Reflex Scores:       Patellar reflexes are 2+ on the right side and 2+ on the left side  Achilles reflexes are 2+ on the right side and 2+ on the left side  5/5 strength in upper lower extremities, sensation intact throughout, cerebellar testing finger-to-nose heel-to-shin rapid alternating movements are intact cranial nerves 2-12 intact  Gait deferred at this time  Visual fields are intact speech is articulate  Uvula is midline  Skin: Skin is warm and dry  Capillary refill takes less than 2 seconds  No rash noted  He is not diaphoretic  No erythema  Psychiatric: He has a normal mood and affect  His behavior is normal  Judgment and thought content normal    Nursing note and vitals reviewed        Vital Signs  ED Triage Vitals   Temperature Pulse Respirations Blood Pressure SpO2   08/01/19 1330 08/01/19 1228 08/01/19 1228 08/01/19 1228 08/01/19 1228   98 °F (36 7 °C) 80 19 116/78 97 %      Temp Source Heart Rate Source Patient Position - Orthostatic VS BP Location FiO2 (%)   08/01/19 1330 08/01/19 1228 08/01/19 1228 08/01/19 1228 --   Tympanic Monitor Lying Right arm       Pain Score       08/01/19 1228       2           Vitals:    08/01/19 1228 08/01/19 1330 08/01/19 1400   BP: 116/78 117/62 105/67   Pulse: 80 84 81   Patient Position - Orthostatic VS: Lying           Visual Acuity      ED Medications  Medications   sodium chloride 0 9 % bolus 1,000 mL (0 mL Intravenous Stopped 8/1/19 1415)   metoclopramide (REGLAN) injection 10 mg (10 mg Intravenous Given 8/1/19 1250)   acetaminophen (TYLENOL) tablet 975 mg (975 mg Oral Given 8/1/19 1255)   iohexol (OMNIPAQUE) 350 MG/ML injection (MULTI-DOSE) 85 mL (85 mL Intravenous Given 8/1/19 1327)   ketorolac (TORADOL) injection 30 mg (30 mg Intravenous Given 8/1/19 1351)       Diagnostic Studies  Results Reviewed     Procedure Component Value Units Date/Time    Troponin I [[id number]]  (Normal) Collected:  08/01/19 1247    Lab Status:  Final result Specimen:  Blood from Arm, Left Updated: 08/01/19 1326     Troponin I <0 02 ng/mL     Basic metabolic panel [[id number]] Collected:  08/01/19 1247    Lab Status:  Final result Specimen:  Blood from Arm, Left Updated:  08/01/19 1318     Sodium 139 mmol/L      Potassium 3 8 mmol/L      Chloride 102 mmol/L      CO2 29 mmol/L      ANION GAP 8 mmol/L      BUN 12 mg/dL      Creatinine 0 84 mg/dL      Glucose 92 mg/dL      Calcium 8 9 mg/dL      eGFR 108 ml/min/1 73sq m     Narrative:       [place] guidelines for Chronic Kidney Disease (CKD):     Stage 1 with normal or high GFR (GFR > 90 mL/min/1 73 square meters)    Stage 2 Mild CKD (GFR = 60-89 mL/min/1 73 square meters)    Stage 3A Moderate CKD (GFR = 45-59 mL/min/1 73 square meters)    Stage 3B Moderate CKD (GFR = 30-44 mL/min/1 73 square meters)    Stage 4 Severe CKD (GFR = 15-29 mL/min/1 73 square meters)    Stage 5 End Stage CKD (GFR <15 mL/min/1 73 square meters)  Note: GFR calculation is accurate only with a steady state creatinine    CBC and differential [[id number]]  (Abnormal) Collected:  08/01/19 1247    Lab Status:  Final result Specimen:  Blood from Arm, Left Updated:  08/01/19 1303     WBC 8 54 Thousand/uL      RBC 6 05 Million/uL      Hemoglobin 13 1 g/dL      Hematocrit 42 4 %      MCV 70 fL      MCH 21 7 pg      MCHC 30 9 g/dL      RDW 15 4 %      MPV 10 2 fL      Platelets 311 Thousands/uL      nRBC 0 /100 WBCs      Neutrophils Relative 64 %      Immat GRANS % 0 %      Lymphocytes Relative 26 %      Monocytes Relative 8 %      Eosinophils Relative 1 %      Basophils Relative 1 %      Neutrophils Absolute 5 45 Thousands/µL      Immature Grans Absolute 0 02 Thousand/uL      Lymphocytes Absolute 2 25 Thousands/µL      Monocytes Absolute 0 70 Thousand/µL      Eosinophils Absolute 0 08 Thousand/µL      Basophils Absolute 0 04 Thousands/µL     POCT Chem 8+ [[id number]]  (Abnormal) Collected:  08/01/19 1250    Lab Status:  Final result Specimen:  Venous Updated: 08/01/19 1255     SODIUM, I-STAT 139 mmol/l      Potassium, i-STAT 3 9 mmol/L      Chloride, istat 100 mmol/L      CO2, i-STAT 30 mmol/L      Anion Gap, i-STAT 14 mmol/L      Calcium, Ionized i-STAT 1 17 mmol/L      BUN, I-STAT 10 mg/dl      Creatinine, i-STAT 0 8 mg/dl      eGFR 110 ml/min/1 73sq m      Glucose, i-STAT 93 mg/dl      Hct, i-STAT 42 %      Hgb, i-STAT 14 3 g/dl      Specimen Type VENOUS    Narrative:       National Kidney Disease Foundation guidelines for Chronic Kidney Disease (CKD):     Stage 1 with normal or high GFR (GFR > 90 mL/min/1 73 square meters)    Stage 2 Mild CKD (GFR = 60-89 mL/min/1 73 square meters)    Stage 3A Moderate CKD (GFR = 45-59 mL/min/1 73 square meters)    Stage 3B Moderate CKD (GFR = 30-44 mL/min/1 73 square meters)    Stage 4 Severe CKD (GFR = 15-29 mL/min/1 73 square meters)    Stage 5 End Stage CKD (GFR <15 mL/min/1 73 square meters)  Note: GFR calculation is accurate only with a steady state creatinine                 CTA head and neck with and without contrast   Final Result by Bro Blas MD (08/01 6588)         1  No acute intracranial abnormality  2  Unremarkable CTA of the head and neck  No evidence of dissection or aneurysm  Workstation performed: [id number]                    Procedures  ECG 12 Lead Documentation Only  Date/Time: 8/1/2019 1:41 PM  Performed by: Nicole Flores DO  Authorized by: Nicole Flores DO     Indications / Diagnosis:  Syncope  ECG reviewed by me, the ED Provider: yes    Patient location:  ED  Interpretation:     Interpretation: normal    Rate:     ECG rate:  74    ECG rate assessment: normal    Rhythm:     Rhythm: sinus rhythm    QRS:     QRS axis:  Normal  Conduction:     Conduction: normal    ST segments:     ST segments:  Normal  T waves:     T waves: normal             ED Course  ED Course as of Aug 01 1800   Thu Aug 01, 2019   1354 Patient feels better at this time  Ambulated without difficulty  MDM  Number of Diagnoses or Management Options  Dizziness:   Headache:   Neck pain:   Diagnosis management comments:   82-year-old male presenting with headache,  Lightheadedness and some neck pain  CT  Angiogram CT head no acute findings  Doubt vascular dissection doubt carotid dissection doubt vertebral artery dissection  EKG  noarrhythmia noted  Laboratory work no abnormalities doubt electrolyte abnormalities  Patient cardiac ischemic workup which was negative  Impression episode of lightheadedness possibly vasovagal, headache in the setting of migraines  Patient is in the 6 hour window, negative CT head doubt subarachnoid hemorrhage  No aneurysmal disease on CTA  Patient felt better after supportive care  Patient agrees to follow-up with PCP  Patient demonstrates understanding of care plan  Patient agrees to follow-up care and demonstrates understanding of ED return precautions         Amount and/or Complexity of Data Reviewed  Clinical lab tests: ordered and reviewed  Tests in the radiology section of CPT®: ordered and reviewed  Tests in the medicine section of CPT®: ordered and reviewed  Review and summarize past medical records: yes  Independent visualization of images, tracings, or specimens: yes    Risk of Complications, Morbidity, and/or Mortality  Presenting problems: high  Diagnostic procedures: high  Management options: low    Patient Progress  Patient progress: improved      Disposition  Final diagnoses:   Dizziness   Neck pain   Headache     Time reflects when diagnosis was documented in both MDM as applicable and the Disposition within this note     Time User Action Codes Description Comment    8/1/2019  1:58 PM Ambar Kiran Add [R42] Dizziness     8/1/2019  1:59 PM Ambar Kiran Add [M54 2] Neck pain     8/1/2019  1:59 PM Moody Mercado 108 Headache       ED Disposition     ED Disposition Condition Date/Time Comment    Discharge Stable Thu Aug 1, 2019  1:58 PM Darren Baum discharge to home/self care  Return precautions were discussed with patient  Patient understands when to return to  Emergency department  Patient agrees to discharge plan and follow up care  Follow-up Information     Follow up With Specialties Details Why Contact Info Additional [address] Zev MD Internal Medicine Go in 2 days  [facility]  [address] [address] [phone]       [address] [facility] Emergency Department Emergency Medicine Go to  As needed, If symptoms worsen [address] [place] [address]  [address] [place] [address] [address] ED, [facility] 96, [address] [facility], [facility]7 [facility], [address]          Discharge Medication List as of 8/1/2019  1:59 PM      CONTINUE these medications which have NOT CHANGED    Details   Multiple Vitamin (MULTIVITAMIN) tablet Take 1 tablet by mouth daily  , Until Discontinued, Historical Med      ranitidine (ZANTAC) 150 mg tablet Take 1 tablet by mouth 2 (two) times a day, Starting Tue 3/28/2017, Historical Med           No discharge procedures on file      ED Provider  Electronically Signed by           Charleen Chavez DO  08/01/19 1126

## 2019-08-29 ENCOUNTER — TELEPHONE (OUTPATIENT)
Dept: FAMILY MEDICINE CLINIC | Facility: HOSPITAL | Age: 44
End: 2019-08-29

## 2019-08-29 DIAGNOSIS — K21.9 GASTROESOPHAGEAL REFLUX DISEASE WITHOUT ESOPHAGITIS: Primary | ICD-10-CM

## 2019-08-29 DIAGNOSIS — K21.9 GASTROESOPHAGEAL REFLUX DISEASE, ESOPHAGITIS PRESENCE NOT SPECIFIED: Primary | ICD-10-CM

## 2019-09-09 ENCOUNTER — OFFICE VISIT (OUTPATIENT)
Dept: GASTROENTEROLOGY | Facility: MEDICAL CENTER | Age: 44
End: 2019-09-09
Payer: COMMERCIAL

## 2019-09-09 VITALS
TEMPERATURE: 97.8 F | HEART RATE: 98 BPM | SYSTOLIC BLOOD PRESSURE: 114 MMHG | BODY MASS INDEX: 24.43 KG/M2 | HEIGHT: 66 IN | DIASTOLIC BLOOD PRESSURE: 70 MMHG | WEIGHT: 152 LBS

## 2019-09-09 DIAGNOSIS — R14.0 BLOATING: ICD-10-CM

## 2019-09-09 DIAGNOSIS — K21.9 GASTROESOPHAGEAL REFLUX DISEASE WITHOUT ESOPHAGITIS: Primary | ICD-10-CM

## 2019-09-09 DIAGNOSIS — K21.9 GASTROESOPHAGEAL REFLUX DISEASE, ESOPHAGITIS PRESENCE NOT SPECIFIED: ICD-10-CM

## 2019-09-09 PROCEDURE — 99214 OFFICE O/P EST MOD 30 MIN: CPT | Performed by: INTERNAL MEDICINE

## 2019-09-09 RX ORDER — ZINC OXIDE 13 %
1 CREAM (GRAM) TOPICAL DAILY
Qty: 30 CAPSULE | Refills: 0 | Status: SHIPPED | OUTPATIENT
Start: 2019-09-09 | End: 2019-10-09

## 2019-09-09 RX ORDER — OMEPRAZOLE 40 MG/1
40 CAPSULE, DELAYED RELEASE ORAL DAILY
Qty: 30 CAPSULE | Refills: 3 | Status: SHIPPED | OUTPATIENT
Start: 2019-09-09 | End: 2021-01-07

## 2019-09-09 NOTE — ASSESSMENT & PLAN NOTE
Bloating and possible recent episode of diverticulitis with abdominal pain  It is unclear if he did have diverticulitis or may have had small intestinal bacterial overgrowth  He did have halitosis and bloating  I would recommend probiotics at this time  Recommend changing his diet to avoid carbohydrates for week and see if there is any difference in his symptoms

## 2019-09-09 NOTE — ASSESSMENT & PLAN NOTE
Gastroesophageal reflux disease without esophagitis on prior endoscopy  With ongoing symptoms I would recommend changing the H2 blockers to PPIs  I would recommend starting on omeprazole 40 mg daily and seeing how he does  Eventually we may be able to taper down to 20 mg  We discussed the long-term side effects and I have recommended using a multivitamin including calcium citrate  We discussed non medical and nonsurgical options for treatment of reflux including STRETTA, trans oral incision less fundoplication  He will look into this further  We also discussed LINX and fundoplication  I would recommend continuing with PPI therapy at this time that is helping

## 2019-09-09 NOTE — PROGRESS NOTES
Outpatient Follow up  [facility] [address]  [address] [facility]  Elizabeth Tijerina MD  Ph : [phone]  Fax : [phone]  Mobile : [phone]  Email : [email or web address]  Also available on [employer]    Rich Franks 45 y o  male MRN: [id number]    PCP: Mechelle Ibrahim MD  Referring: Mechelle Ibrahim MD  [facility]  [address] [facility], [address] [place] [address]    Rich Franks presented for a follow up visit  My recommendations are included  Please do not hesitate to contact me with any questions you may have  ASSESSMENT AND PLAN:      Bloating  Bloating and possible recent episode of diverticulitis with abdominal pain  It is unclear if he did have diverticulitis or may have had small intestinal bacterial overgrowth  He did have halitosis and bloating  I would recommend probiotics at this time  Recommend changing his diet to avoid carbohydrates for week and see if there is any difference in his symptoms  Gastroesophageal reflux disease without esophagitis  Gastroesophageal reflux disease without esophagitis on prior endoscopy  With ongoing symptoms I would recommend changing the H2 blockers to PPIs  I would recommend starting on omeprazole 40 mg daily and seeing how he does  Eventually we may be able to taper down to 20 mg  We discussed the long-term side effects and I have recommended using a multivitamin including calcium citrate  We discussed non medical and nonsurgical options for treatment of reflux including STRETTA, trans oral incision less fundoplication  He will look into this further  We also discussed LINX and fundoplication  I would recommend continuing with PPI therapy at this time that is helping  Diagnoses and all orders for this visit:    Gastroesophageal reflux disease without esophagitis  -     omeprazole (PriLOSEC) 40 MG capsule;  Take 1 capsule (40 mg total) by mouth daily for 30 days    Gastroesophageal reflux disease, esophagitis presence not specified  -     Ambulatory referral to Gastroenterology    Bloating  -     Probiotic Product (PROBIOTIC DAILY) CAPS; Take 1 capsule by mouth daily for 30 days May use any over the counter brand - align, equate ([employer]), culturelle, villarreal or other store brands  ______________________________________________________________________    SUBJECTIVE:  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  He has been experiencing worsening symptoms of reflux over the past 3-4 months  He has heartburn for which he takes ranitidine twice a day and has been using more often than not  He has no nausea vomiting  He has no dysphagia odynophagia  He does experience some regurgitation and has a cough as well  He does not have any sore throat, hoarseness  He does have nocturnal symptoms  His appetite has not changed  About 3 weeks ago he had an episode of abdominal pain in the left lower quadrant  It was not radiating  It was associated with bloating  He had no fevers or chills  He also had bad breath during that time which also improved  He did complain of bloating  He had some mild diarrhea/loose stools  He did see his physician who recommended treatment for possible diverticulitis  He got ciprofloxacin and Flagyl which did help improve his symptoms  His last endoscopy was in 2016 which was unremarkable  Biopsies were normal     REVIEW OF SYSTEMS IS OTHERWISE NEGATIVE  Historical Information   Past Medical History:   Diagnosis Date    GERD (gastroesophageal reflux disease)     PVC (premature ventricular contraction) resolved    Tenosynovitis of wrist      Past Surgical History:   Procedure Laterality Date    COLONOSCOPY      ESOPHAGOGASTRODUODENOSCOPY N/A 10/20/2016    Procedure: ESOPHAGOGASTRODUODENOSCOPY (EGD); Surgeon: Rk Castillo MD;  Location: [facility];   Service:     FEMUR SURGERY      Femur repair    VASECTOMY       Social History   Social History Substance and Sexual Activity   Alcohol Use Yes    Comment: occasional wine     Social History     Substance and Sexual Activity   Drug Use No     Social History     Tobacco Use   Smoking Status Never Smoker   Smokeless Tobacco Never Used     Family History   Problem Relation Age of Onset    Leukemia Mother     Corrales's esophagus Father     Coronary artery disease Father     Diabetes Father     Heart disease Father        Meds/Allergies       Current Outpatient Medications:     Multiple Vitamin (MULTIVITAMIN) tablet    omeprazole (PriLOSEC) 40 MG capsule    Probiotic Product (PROBIOTIC DAILY) CAPS    ranitidine (ZANTAC) 150 mg tablet    Allergies   Allergen Reactions    Amoxicillin-Pot Clavulanate GI Intolerance           Objective     Blood pressure 114/70, pulse 98, temperature 97 8 °F (36 6 °C), temperature source Tympanic, height 5' 6" (1 676 m), weight 68 9 kg (152 lb)  Body mass index is 24 53 kg/m²  PHYSICAL EXAM:      Physical Exam   Constitutional: He is oriented to person, place, and time  Vital signs are normal  He appears well-developed and well-nourished  HENT:   Head: Normocephalic and atraumatic  Eyes: Pupils are equal, round, and reactive to light  Conjunctivae are normal  No scleral icterus  Neck: Normal range of motion  Cardiovascular: Normal rate, regular rhythm and normal heart sounds  Pulmonary/Chest: Effort normal and breath sounds normal  No respiratory distress  Abdominal: Soft  Normal appearance and bowel sounds are normal  He exhibits no distension, no ascites and no mass  There is no hepatosplenomegaly  There is no tenderness  No hernia  Musculoskeletal: Normal range of motion  Lymphadenopathy:     He has no cervical adenopathy  Neurological: He is alert and oriented to person, place, and time  Skin: Skin is warm  Psychiatric: He has a normal mood and affect   His behavior is normal  Thought content normal        Lab Results:   No visits with results within 1 Day(s) from this visit     Latest known visit with results is:   Admission on 08/01/2019, Discharged on 08/01/2019   Component Date Value    WBC 08/01/2019 8 54     RBC 08/01/2019 6 05*    Hemoglobin 08/01/2019 13 1     Hematocrit 08/01/2019 42 4     MCV 08/01/2019 70*    MCH 08/01/2019 21 7*    MCHC 08/01/2019 30 9*    RDW 08/01/2019 15 4*    MPV 08/01/2019 10 2     Platelets 16/18/5735 246     nRBC 08/01/2019 0     Neutrophils Relative 08/01/2019 64     Immat GRANS % 08/01/2019 0     Lymphocytes Relative 08/01/2019 26     Monocytes Relative 08/01/2019 8     Eosinophils Relative 08/01/2019 1     Basophils Relative 08/01/2019 1     Neutrophils Absolute 08/01/2019 5 45     Immature Grans Absolute 08/01/2019 0 02     Lymphocytes Absolute 08/01/2019 2 25     Monocytes Absolute 08/01/2019 0 70     Eosinophils Absolute 08/01/2019 0 08     Basophils Absolute 08/01/2019 0 04     Sodium 08/01/2019 139     Potassium 08/01/2019 3 8     Chloride 08/01/2019 102     CO2 08/01/2019 29     ANION GAP 08/01/2019 8     BUN 08/01/2019 12     Creatinine 08/01/2019 0 84     Glucose 08/01/2019 92     Calcium 08/01/2019 8 9     eGFR 08/01/2019 108     Troponin I 08/01/2019 <0 02     SODIUM, I-STAT 08/01/2019 139     Potassium, i-STAT 08/01/2019 3 9     Chloride, istat 08/01/2019 100     CO2, i-STAT 08/01/2019 30     Anion Gap, i-STAT 08/01/2019 14*    Calcium, Ionized i-STAT 08/01/2019 1 17     BUN, I-STAT 08/01/2019 10     Creatinine, i-STAT 08/01/2019 0 8     eGFR 08/01/2019 110     Glucose, i-STAT 08/01/2019 93     Hct, i-STAT 08/01/2019 42     Hgb, i-STAT 08/01/2019 14 3     Specimen Type 08/01/2019 VENOUS     Ventricular Rate 08/01/2019 74     Atrial Rate 08/01/2019 74     SD Interval 08/01/2019 132     QRSD Interval 08/01/2019 88     QT Interval 08/01/2019 402     QTC Interval 08/01/2019 446     P Axis 08/01/2019 93     QRS Axis 08/01/2019 85     T Wave Axis 08/01/2019 52          Radiology Results:   No results found

## 2019-09-09 NOTE — PATIENT INSTRUCTIONS
1  Probiotics - Align  2  Dietary changes : avoid processed carbohydrates  3  Omeprazole 40 mg daily  4  Exercise  Gastroesophageal Reflux Disease   AMBULATORY CARE:   Gastroesophageal reflux  reflux occurs when acid and food in the stomach back up into the esophagus  Gastroesophageal reflux disease (GERD) is reflux that occurs more than twice a week for a few weeks  It usually causes heartburn and other symptoms  GERD can cause other health problems over time if it is not treated  Common symptoms include:  Heartburn is the most common symptom of GERD  You may feel burning pain in your chest or below the breast bone  This usually occurs after meals and spreads to your neck, jaw, or shoulder  The pain gets better when you change positions  You may also have any of the following:  · Bitter or acid taste in your mouth    · Dry cough    · Trouble swallowing or pain with swallowing    · Hoarseness or sore throat    · Frequent burping or hiccups    · Feeling of fullness soon after you start eating  Seek care immediately if:  · You feel full and cannot burp or vomit  · You have severe chest pain and sudden trouble breathing  · Your bowel movements are black, bloody, or tarry-looking  · Your vomit looks like coffee grounds or has blood in it  Contact your healthcare provider if:   · You vomit large amounts, or you vomit often  · You have trouble breathing after you vomit  · You have trouble swallowing, or pain with swallowing  · You are losing weight without trying  · Your symptoms get worse or do not improve with treatment  · You have questions or concerns about your condition or care  Treatment for GERD:  Your healthcare provider may prescribe medicine to decrease stomach acid  He may also prescribe medicine that help your esophagus and stomach move food and liquid to your intestines  Surgery may be done if other treatments do not work   You may need surgery to wrap the upper part of the stomach around the esophageal sphincter  This will strengthen the sphincter and prevent reflux  Manage GERD:   · Do not have foods or drinks that may increase heartburn  These include chocolate, peppermint, fried or fatty foods, drinks that contain caffeine, or carbonated drinks (soda)  Other foods include spicy foods, onions, tomatoes, and tomato-based foods  Do not have foods or drinks that can irritate your esophagus, such as citrus fruits, juices, and alcohol  · Do not eat large meals  When you eat a lot of food at one time, your stomach needs more acid to digest it  Eat 6 small meals each day instead of 3 large ones, and eat slowly  Do not eat meals 2 to 3 hours before bedtime  · Elevate the head of your bed  Place 6-inch blocks under the head of your bed frame  You may also use more than one pillow under your head and shoulders while you sleep  · Maintain a healthy weight  If you are overweight, weight loss may help relieve symptoms of GERD  · Do not smoke  Smoking weakens the lower esophageal sphincter and increases the risk of GERD  Ask your healthcare provider for information if you currently smoke and need help to quit  E-cigarettes or smokeless tobacco still contain nicotine  Talk to your healthcare provider before you use these products  · Do not wear clothing that is tight around your waist   Tight clothing can put pressure on your stomach and cause or worsen GERD symptoms  Follow up with your healthcare provider as directed:  Write down your questions so you remember to ask them during your visits  © 2017 [facility] Information is for End User's use only and may not be sold, redistributed or otherwise used for commercial purposes  All illustrations and images included in CareNotes® are the copyrighted property of [employer]  or Augie Leal  The above information is an  only   It is not intended as medical advice for individual conditions or treatments  Talk to your doctor, nurse or pharmacist before following any medical regimen to see if it is safe and effective for you

## 2019-12-03 ENCOUNTER — TRANSCRIBE ORDERS (OUTPATIENT)
Dept: RADIOLOGY | Facility: HOSPITAL | Age: 44
End: 2019-12-03

## 2019-12-03 ENCOUNTER — HOSPITAL ENCOUNTER (OUTPATIENT)
Dept: RADIOLOGY | Facility: HOSPITAL | Age: 44
Discharge: HOME | End: 2019-12-03
Attending: ORTHOPAEDIC SURGERY
Payer: COMMERCIAL
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
## 2019-12-03 PROCEDURE — 72100 X-RAY EXAM L-S SPINE 2/3 VWS: CPT

## 2020-01-29 ENCOUNTER — HOSPITAL ENCOUNTER (EMERGENCY)
Facility: HOSPITAL | Age: 45
Discharge: HOME | End: 2020-01-29
Attending: EMERGENCY MEDICINE | Admitting: EMERGENCY MEDICINE
Payer: COMMERCIAL

## 2020-01-29 ENCOUNTER — APPOINTMENT (EMERGENCY)
Dept: RADIOLOGY | Facility: HOSPITAL | Age: 45
End: 2020-01-29
Payer: COMMERCIAL

## 2020-01-29 ENCOUNTER — APPOINTMENT (EMERGENCY)
Dept: NON INVASIVE DIAGNOSTICS | Facility: HOSPITAL | Age: 45
End: 2020-01-29
Attending: INTERNAL MEDICINE
Payer: COMMERCIAL

## 2020-01-29 VITALS
DIASTOLIC BLOOD PRESSURE: 76 MMHG | OXYGEN SATURATION: 96 % | RESPIRATION RATE: 18 BRPM | HEART RATE: 94 BPM | WEIGHT: 152 LBS | BODY MASS INDEX: 24.53 KG/M2 | SYSTOLIC BLOOD PRESSURE: 127 MMHG

## 2020-01-29 DIAGNOSIS — R07.89 OTHER CHEST PAIN: Primary | ICD-10-CM

## 2020-01-29 LAB
ALBUMIN SERPL BCP-MCNC: 3.9 G/DL (ref 3.5–5)
ALP SERPL-CCNC: 77 U/L (ref 46–116)
ALT SERPL W P-5'-P-CCNC: 38 U/L (ref 12–78)
ANION GAP SERPL CALCULATED.3IONS-SCNC: 7 MMOL/L (ref 4–13)
AST SERPL W P-5'-P-CCNC: 17 U/L (ref 5–45)
BASOPHILS # BLD AUTO: 0.03 THOUSANDS/ΜL (ref 0–0.1)
BASOPHILS NFR BLD AUTO: 0 % (ref 0–1)
BILIRUB SERPL-MCNC: 0.44 MG/DL (ref 0.2–1)
BUN SERPL-MCNC: 16 MG/DL (ref 5–25)
CALCIUM SERPL-MCNC: 9.2 MG/DL (ref 8.3–10.1)
CHLORIDE SERPL-SCNC: 103 MMOL/L (ref 100–108)
CO2 SERPL-SCNC: 27 MMOL/L (ref 21–32)
CREAT SERPL-MCNC: 0.86 MG/DL (ref 0.6–1.3)
EOSINOPHIL # BLD AUTO: 0.09 THOUSAND/ΜL (ref 0–0.61)
EOSINOPHIL NFR BLD AUTO: 1 % (ref 0–6)
ERYTHROCYTE [DISTWIDTH] IN BLOOD BY AUTOMATED COUNT: 14.6 % (ref 11.6–15.1)
GFR SERPL CREATININE-BSD FRML MDRD: 107 ML/MIN/1.73SQ M
GLUCOSE SERPL-MCNC: 117 MG/DL (ref 65–140)
HCT VFR BLD AUTO: 41.1 % (ref 36.5–49.3)
HGB BLD-MCNC: 12.5 G/DL (ref 12–17)
IMM GRANULOCYTES # BLD AUTO: 0.02 THOUSAND/UL (ref 0–0.2)
IMM GRANULOCYTES NFR BLD AUTO: 0 % (ref 0–2)
LYMPHOCYTES # BLD AUTO: 2.04 THOUSANDS/ΜL (ref 0.6–4.47)
LYMPHOCYTES NFR BLD AUTO: 29 % (ref 14–44)
MAGNESIUM SERPL-MCNC: 2.3 MG/DL (ref 1.6–2.6)
MCH RBC QN AUTO: 21.3 PG (ref 26.8–34.3)
MCHC RBC AUTO-ENTMCNC: 30.4 G/DL (ref 31.4–37.4)
MCV RBC AUTO: 70 FL (ref 82–98)
MONOCYTES # BLD AUTO: 0.57 THOUSAND/ΜL (ref 0.17–1.22)
MONOCYTES NFR BLD AUTO: 8 % (ref 4–12)
NEUTROPHILS # BLD AUTO: 4.37 THOUSANDS/ΜL (ref 1.85–7.62)
NEUTS SEG NFR BLD AUTO: 62 % (ref 43–75)
NRBC BLD AUTO-RTO: 0 /100 WBCS
PLATELET # BLD AUTO: 235 THOUSANDS/UL (ref 149–390)
PMV BLD AUTO: 9.8 FL (ref 8.9–12.7)
POTASSIUM SERPL-SCNC: 3.6 MMOL/L (ref 3.5–5.3)
PROT SERPL-MCNC: 7.8 G/DL (ref 6.4–8.2)
RBC # BLD AUTO: 5.87 MILLION/UL (ref 3.88–5.62)
SODIUM SERPL-SCNC: 137 MMOL/L (ref 136–145)
TROPONIN I SERPL-MCNC: <0.02 NG/ML
TSH SERPL DL<=0.05 MIU/L-ACNC: 1.48 UIU/ML (ref 0.36–3.74)
WBC # BLD AUTO: 7.12 THOUSAND/UL (ref 4.31–10.16)

## 2020-01-29 PROCEDURE — 83735 ASSAY OF MAGNESIUM: CPT | Performed by: EMERGENCY MEDICINE

## 2020-01-29 PROCEDURE — 96361 HYDRATE IV INFUSION ADD-ON: CPT

## 2020-01-29 PROCEDURE — 99284 EMERGENCY DEPT VISIT MOD MDM: CPT | Performed by: EMERGENCY MEDICINE

## 2020-01-29 PROCEDURE — 36415 COLL VENOUS BLD VENIPUNCTURE: CPT | Performed by: EMERGENCY MEDICINE

## 2020-01-29 PROCEDURE — 93351 STRESS TTE COMPLETE: CPT | Performed by: INTERNAL MEDICINE

## 2020-01-29 PROCEDURE — 84484 ASSAY OF TROPONIN QUANT: CPT | Performed by: EMERGENCY MEDICINE

## 2020-01-29 PROCEDURE — 71046 X-RAY EXAM CHEST 2 VIEWS: CPT

## 2020-01-29 PROCEDURE — 85025 COMPLETE CBC W/AUTO DIFF WBC: CPT | Performed by: EMERGENCY MEDICINE

## 2020-01-29 PROCEDURE — 84443 ASSAY THYROID STIM HORMONE: CPT | Performed by: EMERGENCY MEDICINE

## 2020-01-29 PROCEDURE — 99285 EMERGENCY DEPT VISIT HI MDM: CPT

## 2020-01-29 PROCEDURE — 93005 ELECTROCARDIOGRAM TRACING: CPT

## 2020-01-29 PROCEDURE — 96360 HYDRATION IV INFUSION INIT: CPT

## 2020-01-29 PROCEDURE — 93350 STRESS TTE ONLY: CPT

## 2020-01-29 PROCEDURE — 80053 COMPREHEN METABOLIC PANEL: CPT | Performed by: EMERGENCY MEDICINE

## 2020-01-29 RX ADMIN — SODIUM CHLORIDE 1000 ML: 0.9 INJECTION, SOLUTION INTRAVENOUS at 12:46

## 2020-01-29 NOTE — ED PROVIDER NOTES
History  Chief Complaint   Patient presents with    Chest Pain     Patient complains of left sided chest pain that radiates to back   Dizziness     53-year-old male, previously healthy, presenting to the emergency department for evaluation chest pain, back pain, and feeling foggy with intermittent lightheaded type dizziness  Patient reports that he has been symptomatic for the past several days  He has been experiencing a discomfort in the left medial scapular area which is non reproducible with palpation  He has had several episodes of intermittent left mid axillary pain which he would describe as sharp, brief in duration, without associated symptoms  The patient has noticed that he has been having intermittent episodes of lightheaded type dizziness, sometimes occurring at rest, sometimes occurring with standing  The patient additionally states that he has been feeling foggy and describes that he was at lunch with individuals, and was unable to follow through the conversation with these individuals  The patient is currently denying chest pain  He reports that he is having thoracic back pain  He denies any fever, cough, abdominal pain  Prior to Admission Medications   Prescriptions Last Dose Informant Patient Reported? Taking? Multiple Vitamin (MULTIVITAMIN) tablet   Yes Yes   Sig: Take 1 tablet by mouth daily  omeprazole (PriLOSEC) 40 MG capsule Past Week at Unknown time  No Yes   Sig: Take 1 capsule (40 mg total) by mouth daily for 30 days      Facility-Administered Medications: None       Past Medical History:   Diagnosis Date    GERD (gastroesophageal reflux disease)     PVC (premature ventricular contraction) resolved    Tenosynovitis of wrist        Past Surgical History:   Procedure Laterality Date    COLONOSCOPY      ESOPHAGOGASTRODUODENOSCOPY N/A 10/20/2016    Procedure: ESOPHAGOGASTRODUODENOSCOPY (EGD); Surgeon: Brandon Hensley MD;  Location: [facility];   Service:     FEMUR SURGERY      Femur repair    VASECTOMY         Family History   Problem Relation Age of Onset    Leukemia Mother     Corrales's esophagus Father     Coronary artery disease Father     Diabetes Father     Heart disease Father      I have reviewed and agree with the history as documented  Social History     Tobacco Use    Smoking status: Never Smoker    Smokeless tobacco: Never Used   Substance Use Topics    Alcohol use: Yes     Comment: occasional wine    Drug use: No        Review of Systems   Constitutional: Negative for fatigue and fever  Respiratory: Negative for cough and shortness of breath  Cardiovascular: Positive for chest pain  Gastrointestinal: Negative for abdominal pain and nausea  Neurological: Positive for dizziness and light-headedness  All other systems reviewed and are negative  Physical Exam  Physical Exam   Constitutional: He is oriented to person, place, and time  He appears well-developed and well-nourished  HENT:   Head: Normocephalic and atraumatic  Eyes: Pupils are equal, round, and reactive to light  EOM are normal    Neck: Normal range of motion  Neck supple  Cardiovascular: Normal rate, regular rhythm and normal pulses  No systolic murmur is present  No diastolic murmur is present  Pulses:       Radial pulses are 2+ on the right side, and 2+ on the left side  Pulmonary/Chest: Effort normal and breath sounds normal  No respiratory distress  Abdominal: Soft  Bowel sounds are normal  There is no tenderness  Musculoskeletal: Normal range of motion  Right lower leg: Normal  He exhibits no tenderness and no edema  Left lower leg: Normal  He exhibits no tenderness and no edema  Neurological: He is alert and oriented to person, place, and time  He has normal strength  No cranial nerve deficit or sensory deficit  Gait normal    Skin: Skin is warm and dry  Capillary refill takes less than 2 seconds  Vitals reviewed        Vital Signs  ED Triage Vitals   Temp Pulse Respirations Blood Pressure SpO2   -- 01/29/20 1230 01/29/20 1230 01/29/20 1230 01/29/20 1230    86 21 144/81 99 %      Temp src Heart Rate Source Patient Position - Orthostatic VS BP Location FiO2 (%)   -- 01/29/20 1230 01/29/20 1230 01/29/20 1230 --    Monitor Lying Right arm       Pain Score       01/29/20 1420       No Pain           Vitals:    01/29/20 1230 01/29/20 1300 01/29/20 1420 01/29/20 1430   BP: 144/81 126/74 127/76 127/76   Pulse: 86 84 89 94   Patient Position - Orthostatic VS: Lying Lying Sitting Lying         Visual Acuity      ED Medications  Medications   sodium chloride 0 9 % bolus 1,000 mL (0 mL Intravenous Stopped 1/29/20 1442)       Diagnostic Studies  Results Reviewed     Procedure Component Value Units Date/Time    TSH [[id number]]  (Normal) Collected:  01/29/20 1245    Lab Status:  Final result Specimen:  Blood from Arm, Right Updated:  01/29/20 1330     TSH 3RD GENERATON 1 480 uIU/mL     Narrative:       Patients undergoing fluorescein dye angiography may retain small amounts of fluorescein in the body for 48-72 hours post procedure  Samples containing fluorescein can produce falsely depressed TSH values  If the patient had this procedure,a specimen should be resubmitted post fluorescein clearance        Comprehensive metabolic panel [[id number]] Collected:  01/29/20 1245    Lab Status:  Final result Specimen:  Blood from Arm, Right Updated:  01/29/20 1322     Sodium 137 mmol/L      Potassium 3 6 mmol/L      Chloride 103 mmol/L      CO2 27 mmol/L      ANION GAP 7 mmol/L      BUN 16 mg/dL      Creatinine 0 86 mg/dL      Glucose 117 mg/dL      Calcium 9 2 mg/dL      AST 17 U/L      ALT 38 U/L      Alkaline Phosphatase 77 U/L      Total Protein 7 8 g/dL      Albumin 3 9 g/dL      Total Bilirubin 0 44 mg/dL      eGFR 107 ml/min/1 73sq m     Narrative:       [place] guidelines for Chronic Kidney Disease (CKD):     Stage 1 with normal or high GFR (GFR > 90 mL/min/1 73 square meters)    Stage 2 Mild CKD (GFR = 60-89 mL/min/1 73 square meters)    Stage 3A Moderate CKD (GFR = 45-59 mL/min/1 73 square meters)    Stage 3B Moderate CKD (GFR = 30-44 mL/min/1 73 square meters)    Stage 4 Severe CKD (GFR = 15-29 mL/min/1 73 square meters)    Stage 5 End Stage CKD (GFR <15 mL/min/1 73 square meters)  Note: GFR calculation is accurate only with a steady state creatinine    Troponin I [[id number]]  (Normal) Collected:  01/29/20 1245    Lab Status:  Final result Specimen:  Blood from Arm, Right Updated:  01/29/20 1322     Troponin I <0 02 ng/mL     Magnesium [[id number]]  (Normal) Collected:  01/29/20 1245    Lab Status:  Final result Specimen:  Blood from Arm, Right Updated:  01/29/20 1322     Magnesium 2 3 mg/dL     CBC and differential [[id number]]  (Abnormal) Collected:  01/29/20 1245    Lab Status:  Final result Specimen:  Blood from Arm, Right Updated:  01/29/20 1253     WBC 7 12 Thousand/uL      RBC 5 87 Million/uL      Hemoglobin 12 5 g/dL      Hematocrit 41 1 %      MCV 70 fL      MCH 21 3 pg      MCHC 30 4 g/dL      RDW 14 6 %      MPV 9 8 fL      Platelets 208 Thousands/uL      nRBC 0 /100 WBCs      Neutrophils Relative 62 %      Immat GRANS % 0 %      Lymphocytes Relative 29 %      Monocytes Relative 8 %      Eosinophils Relative 1 %      Basophils Relative 0 %      Neutrophils Absolute 4 37 Thousands/µL      Immature Grans Absolute 0 02 Thousand/uL      Lymphocytes Absolute 2 04 Thousands/µL      Monocytes Absolute 0 57 Thousand/µL      Eosinophils Absolute 0 09 Thousand/µL      Basophils Absolute 0 03 Thousands/µL                  XR chest 2 views   ED Interpretation by Bing Underwood MD (01/29 9442)   No acute cardiopulmonary disease                   Procedures  Procedures         ED Course                               MDM  Number of Diagnoses or Management Options  Other chest pain:   Diagnosis management comments: 45-year-old male, previously healthy, presenting with chest and back pain  Patient was seen by Dr Tapan Chinchilla from cardiology who took the patient for a stress test   Stress test is reportedly negative  Follow-up chest x-ray demonstrates narrow mediastinum  Given normal aortic root on echo on stress echo and normotensive and not tachycardic, unlikely dissection  Disposition  Final diagnoses:   Other chest pain     Time reflects when diagnosis was documented in both MDM as applicable and the Disposition within this note     Time User Action Codes Description Comment    1/29/2020  2:28 PM Erie Meigs Add [R07 89] Other chest pain       ED Disposition     ED Disposition Condition Date/Time Comment    Discharge Stable Wed Jan 29, 2020  2:28 PM Harshal Pompa 52 discharge to home/self care  Follow-up Information     Follow up With Specialties Details Why 500 Boris Valentino MD Internal Medicine Schedule an appointment as soon as possible for a visit  As needed Taty  [facility]  [id number] [place] [address] [address] [facility]            Discharge Medication List as of 1/29/2020  2:29 PM      CONTINUE these medications which have NOT CHANGED    Details   Multiple Vitamin (MULTIVITAMIN) tablet Take 1 tablet by mouth daily  , Until Discontinued, Historical Med      omeprazole (PriLOSEC) 40 MG capsule Take 1 capsule (40 mg total) by mouth daily for 30 days, Starting Mon 9/9/2019, Until Wed 1/29/2020, Normal           No discharge procedures on file      ED Provider  Electronically Signed by           Norberto Klein MD  01/29/20 0231

## 2020-01-30 LAB
CHEST PAIN STATEMENT: NORMAL
MAX DIASTOLIC BP: 60 MMHG
MAX HEART RATE: 173 BPM
MAX PREDICTED HEART RATE: 178 BPM
MAX. SYSTOLIC BP: 160 MMHG
PROTOCOL NAME: NORMAL
REASON FOR TERMINATION: NORMAL
TARGET HR FORMULA: NORMAL
TEST INDICATION: NORMAL
TIME IN EXERCISE PHASE: NORMAL

## 2020-02-11 LAB
ATRIAL RATE: 81 BPM
P AXIS: 86 DEGREES
PR INTERVAL: 134 MS
QRS AXIS: 85 DEGREES
QRSD INTERVAL: 88 MS
QT INTERVAL: 372 MS
QTC INTERVAL: 432 MS
T WAVE AXIS: 54 DEGREES
VENTRICULAR RATE: 81 BPM

## 2020-02-11 PROCEDURE — 93010 ELECTROCARDIOGRAM REPORT: CPT | Performed by: INTERNAL MEDICINE

## 2020-08-03 ENCOUNTER — APPOINTMENT (OUTPATIENT)
Dept: RADIOLOGY | Facility: CLINIC | Age: 45
End: 2020-08-03
Payer: COMMERCIAL

## 2020-08-03 DIAGNOSIS — M79.645 FINGER PAIN, LEFT: ICD-10-CM

## 2020-08-03 DIAGNOSIS — M79.645 FINGER PAIN, LEFT: Primary | ICD-10-CM

## 2020-08-03 PROCEDURE — 73140 X-RAY EXAM OF FINGER(S): CPT

## 2020-12-18 ENCOUNTER — IMMUNIZATIONS (OUTPATIENT)
Dept: FAMILY MEDICINE CLINIC | Facility: HOSPITAL | Age: 45
End: 2020-12-18
Payer: COMMERCIAL

## 2020-12-18 DIAGNOSIS — Z23 ENCOUNTER FOR IMMUNIZATION: ICD-10-CM

## 2020-12-18 PROCEDURE — 91300 SARS-COV-2 / COVID-19 MRNA VACCINE (PFIZER-BIONTECH) 30 MCG: CPT

## 2020-12-18 PROCEDURE — 0001A SARS-COV-2 / COVID-19 MRNA VACCINE (PFIZER-BIONTECH) 30 MCG: CPT

## 2020-12-22 ENCOUNTER — HOSPITAL ENCOUNTER (EMERGENCY)
Facility: HOSPITAL | Age: 45
Discharge: HOME | End: 2020-12-22
Attending: EMERGENCY MEDICINE
Payer: OTHER MISCELLANEOUS
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
## 2020-12-22 LAB
ALT SERPL W P-5'-P-CCNC: 41 U/L (ref 12–78)
HBV SURFACE AB SER-ACNC: 259.11 MIU/ML
HBV SURFACE AG SER QL: NORMAL
HCV AB SER QL: NORMAL

## 2020-12-22 PROCEDURE — 36415 COLL VENOUS BLD VENIPUNCTURE: CPT | Performed by: PHYSICIAN ASSISTANT

## 2020-12-22 PROCEDURE — 99282 EMERGENCY DEPT VISIT SF MDM: CPT | Performed by: EMERGENCY MEDICINE

## 2020-12-22 PROCEDURE — 87340 HEPATITIS B SURFACE AG IA: CPT | Performed by: PHYSICIAN ASSISTANT

## 2020-12-22 PROCEDURE — 99283 EMERGENCY DEPT VISIT LOW MDM: CPT

## 2020-12-22 PROCEDURE — 84460 ALANINE AMINO (ALT) (SGPT): CPT | Performed by: PHYSICIAN ASSISTANT

## 2020-12-22 PROCEDURE — 86803 HEPATITIS C AB TEST: CPT | Performed by: PHYSICIAN ASSISTANT

## 2020-12-22 PROCEDURE — 86706 HEP B SURFACE ANTIBODY: CPT | Performed by: PHYSICIAN ASSISTANT

## 2020-12-22 PROCEDURE — 87389 HIV-1 AG W/HIV-1&-2 AB AG IA: CPT | Performed by: PHYSICIAN ASSISTANT

## 2020-12-23 LAB — HIV 1+2 AB+HIV1 P24 AG SERPL QL IA: NORMAL

## 2021-01-06 ENCOUNTER — IMMUNIZATIONS (OUTPATIENT)
Dept: FAMILY MEDICINE CLINIC | Facility: HOSPITAL | Age: 46
End: 2021-01-06

## 2021-01-06 DIAGNOSIS — Z23 ENCOUNTER FOR IMMUNIZATION: ICD-10-CM

## 2021-01-06 PROCEDURE — 0002A SARS-COV-2 / COVID-19 MRNA VACCINE (PFIZER-BIONTECH) 30 MCG: CPT

## 2021-01-06 PROCEDURE — 91300 SARS-COV-2 / COVID-19 MRNA VACCINE (PFIZER-BIONTECH) 30 MCG: CPT

## 2021-01-07 ENCOUNTER — OFFICE VISIT (OUTPATIENT)
Dept: FAMILY MEDICINE CLINIC | Facility: HOSPITAL | Age: 46
End: 2021-01-07
Payer: COMMERCIAL

## 2021-01-07 VITALS
SYSTOLIC BLOOD PRESSURE: 122 MMHG | BODY MASS INDEX: 23.54 KG/M2 | HEART RATE: 90 BPM | DIASTOLIC BLOOD PRESSURE: 64 MMHG | WEIGHT: 150 LBS | OXYGEN SATURATION: 97 % | HEIGHT: 67 IN | RESPIRATION RATE: 16 BRPM

## 2021-01-07 DIAGNOSIS — Z00.00 ANNUAL PHYSICAL EXAM: Primary | ICD-10-CM

## 2021-01-07 DIAGNOSIS — K21.9 GASTROESOPHAGEAL REFLUX DISEASE WITHOUT ESOPHAGITIS: ICD-10-CM

## 2021-01-07 DIAGNOSIS — Z13.6 SCREENING FOR CARDIOVASCULAR CONDITION: ICD-10-CM

## 2021-01-07 DIAGNOSIS — E04.1 THYROID NODULE: ICD-10-CM

## 2021-01-07 DIAGNOSIS — Z13.1 SCREENING FOR DIABETES MELLITUS: ICD-10-CM

## 2021-01-07 PROCEDURE — 99396 PREV VISIT EST AGE 40-64: CPT | Performed by: INTERNAL MEDICINE

## 2021-01-07 PROCEDURE — 1036F TOBACCO NON-USER: CPT | Performed by: INTERNAL MEDICINE

## 2021-01-07 PROCEDURE — 3008F BODY MASS INDEX DOCD: CPT | Performed by: INTERNAL MEDICINE

## 2021-01-07 PROCEDURE — 3725F SCREEN DEPRESSION PERFORMED: CPT | Performed by: INTERNAL MEDICINE

## 2021-01-07 NOTE — PROGRESS NOTES
ADULT ANNUAL [address] [facility]    NAME: Carlo Kapoor  AGE: 39 y o  SEX: male  : 1975     DATE: 2021     Assessment and Plan:     Problem List Items Addressed This Visit        Digestive    Gastroesophageal reflux disease without esophagitis    Relevant Orders    CBC       Endocrine    Thyroid nodule    Relevant Orders    TSH, 3rd generation with Free T4 reflex      Other Visit Diagnoses     Annual physical exam    -  Primary    Screening for diabetes mellitus        Relevant Orders    Comprehensive metabolic panel    Screening for cardiovascular condition        Relevant Orders    Lipid panel          Immunizations and preventive care screenings were discussed with patient today  Appropriate education was printed on patient's after visit summary  Counseling:  · Exercise: the importance of regular exercise/physical activity was discussed  Recommend exercise 3-5 times per week for at least 30 minutes  Suspect that many of pt's complaints are related to deaths in the family, increased demands and stress in life  Will get labs, I asked pt to try to take days off for rest, exercise at least 3 days a week  No follow-ups on file  Chief Complaint:     Chief Complaint   Patient presents with    Annual Exam      History of Present Illness:     Adult Annual Physical   Patient here for a comprehensive physical exam  The patient reports problems - loss of interes at times, change in erections, intermittent headaches, increased stress, deaths in family  Diet and Physical Activity  · Diet/Nutrition: well balanced diet  · Exercise: moderate cardiovascular exercise        Depression Screening  PHQ-9 Depression Screening    PHQ-9:   Frequency of the following problems over the past two weeks:      Little interest or pleasure in doing things: 1 - several days  Feeling down, depressed, or hopeless: 1 - several days  PHQ-2 Score: 2       General Health  · Sleep: sleeps well  · Hearing: normal - bilateral   · Vision: wears glasses  · Dental: regular dental visits   Health  · Symptoms include: erectile dysfunction decreased erections at times     Review of Systems:     Review of Systems   Constitutional: Negative for fever  HENT: Negative for congestion  Eyes: Negative for visual disturbance  Respiratory: Negative for cough and shortness of breath  Cardiovascular: Negative for chest pain, palpitations and leg swelling  Gastrointestinal: Negative for abdominal pain, blood in stool and diarrhea  Takes PPI prn    Endocrine: Negative for polydipsia and polyphagia  Genitourinary: Negative for difficulty urinating and hematuria  Musculoskeletal: Negative for joint swelling, myalgias and neck stiffness  Skin: Negative for rash  Neurological: Negative for weakness  Hematological: Negative for adenopathy  All other systems reviewed and are negative  Past Medical History:     Past Medical History:   Diagnosis Date    GERD (gastroesophageal reflux disease)     PVC (premature ventricular contraction) resolved    Tenosynovitis of wrist       Past Surgical History:     Past Surgical History:   Procedure Laterality Date    COLONOSCOPY      ESOPHAGOGASTRODUODENOSCOPY N/A 10/20/2016    Procedure: ESOPHAGOGASTRODUODENOSCOPY (EGD); Surgeon: Tracie Smith MD;  [facility];   Service:     FEMUR SURGERY      Femur repair    VASECTOMY        Family History:     Family History   Problem Relation Age of Onset    Leukemia Mother     Corrales's esophagus Father     Coronary artery disease Father     Diabetes Father     Heart disease Father     Substance Abuse Neg Hx     Mental illness Neg Hx       Social History:        Social History     Socioeconomic History    Marital status: /Civil Union     Spouse name: None    Number of children: None    Years of education: None    Highest education level: None   Occupational History    None   Social Needs    Financial resource strain: None    Food insecurity     Worry: None     Inability: None    Transportation needs     Medical: No     Non-medical: No   Tobacco Use    Smoking status: Never Smoker    Smokeless tobacco: Never Used   Substance and Sexual Activity    Alcohol use: Yes     Comment: occasional wine    Drug use: No    Sexual activity: None   Lifestyle    Physical activity     Days per week: 0 days     Minutes per session: 0 min    Stress: Rather much   Relationships    Social connections     Talks on phone: None     Gets together: None     Attends Jewish service: None     Active member of club or organization: None     Attends meetings of clubs or organizations: None     Relationship status: None    Intimate partner violence     Fear of current or ex partner: None     Emotionally abused: None     Physically abused: None     Forced sexual activity: None   Other Topics Concern    None   Social History Narrative    No alcohol use - As per [employer]     Wears seatbelt    Regular dental care    Limited exercise    No living will      Current Medications:     Current Outpatient Medications   Medication Sig Dispense Refill    Multiple Vitamin (MULTIVITAMIN) tablet Take 1 tablet by mouth daily   omeprazole (PriLOSEC) 40 MG capsule Take 1 capsule (40 mg total) by mouth daily for 30 days (Patient taking differently: Take 40 mg by mouth daily as needed ) 30 capsule 3     No current facility-administered medications for this visit  Allergies: Allergies   Allergen Reactions    Amoxicillin-Pot Clavulanate GI Intolerance      Physical Exam:     /64 (BP Location: Left arm, Patient Position: Sitting)   Pulse 90   Resp 16   Ht 5' 6 5" (1 689 m)   Wt 68 kg (150 lb)   SpO2 97%   BMI 23 85 kg/m²     Physical Exam  Constitutional:       Appearance: He is well-developed  HENT:      Head: Normocephalic  Eyes:      Conjunctiva/sclera: Conjunctivae normal    Neck:      Musculoskeletal: Neck supple  Cardiovascular:      Rate and Rhythm: Normal rate and regular rhythm  Heart sounds: No murmur  Pulmonary:      Effort: No respiratory distress  Breath sounds: No wheezing or rales  Abdominal:      Tenderness: There is no abdominal tenderness  Genitourinary:     Scrotum/Testes: Normal    Musculoskeletal:         General: Tenderness present  Skin:     General: Skin is warm and dry  Findings: No erythema  Neurological:      Mental Status: He is alert and oriented to person, place, and time  Cranial Nerves: No cranial nerve deficit  Psychiatric:         Mood and Affect: Mood normal           Mckenzie Tipton MD  [facility]  Depression Screening Follow-up Plan: Patient's depression screening was positive with a PHQ-2 score of 2  Their PHQ-9 score was   Patient's depressive symptoms likely due to other medical condition  Would recommend treatment of underlying condition  Will continue to monitor at next office visit

## 2021-01-07 NOTE — PATIENT INSTRUCTIONS

## 2021-03-26 ENCOUNTER — APPOINTMENT (OUTPATIENT)
Dept: LAB | Facility: HOSPITAL | Age: 46
End: 2021-03-26
Attending: INTERNAL MEDICINE
Payer: COMMERCIAL

## 2021-03-26 DIAGNOSIS — K21.9 GASTROESOPHAGEAL REFLUX DISEASE WITHOUT ESOPHAGITIS: ICD-10-CM

## 2021-03-26 DIAGNOSIS — E04.1 THYROID NODULE: ICD-10-CM

## 2021-03-26 DIAGNOSIS — Z13.6 SCREENING FOR CARDIOVASCULAR CONDITION: ICD-10-CM

## 2021-03-26 DIAGNOSIS — Z13.1 SCREENING FOR DIABETES MELLITUS: ICD-10-CM

## 2021-03-26 LAB
ALBUMIN SERPL BCP-MCNC: 3.8 G/DL (ref 3.5–5)
ALP SERPL-CCNC: 71 U/L (ref 46–116)
ALT SERPL W P-5'-P-CCNC: 31 U/L (ref 12–78)
ANION GAP SERPL CALCULATED.3IONS-SCNC: 1 MMOL/L (ref 4–13)
AST SERPL W P-5'-P-CCNC: 15 U/L (ref 5–45)
BILIRUB SERPL-MCNC: 0.35 MG/DL (ref 0.2–1)
BUN SERPL-MCNC: 18 MG/DL (ref 5–25)
CALCIUM SERPL-MCNC: 9 MG/DL (ref 8.3–10.1)
CHLORIDE SERPL-SCNC: 109 MMOL/L (ref 100–108)
CHOLEST SERPL-MCNC: 169 MG/DL (ref 50–200)
CO2 SERPL-SCNC: 29 MMOL/L (ref 21–32)
CREAT SERPL-MCNC: 0.9 MG/DL (ref 0.6–1.3)
ERYTHROCYTE [DISTWIDTH] IN BLOOD BY AUTOMATED COUNT: 14.8 % (ref 11.6–15.1)
GFR SERPL CREATININE-BSD FRML MDRD: 104 ML/MIN/1.73SQ M
GLUCOSE P FAST SERPL-MCNC: 89 MG/DL (ref 65–99)
HCT VFR BLD AUTO: 41.5 % (ref 36.5–49.3)
HDLC SERPL-MCNC: 62 MG/DL
HGB BLD-MCNC: 12.6 G/DL (ref 12–17)
LDLC SERPL CALC-MCNC: 100 MG/DL (ref 0–100)
MCH RBC QN AUTO: 21.6 PG (ref 26.8–34.3)
MCHC RBC AUTO-ENTMCNC: 30.4 G/DL (ref 31.4–37.4)
MCV RBC AUTO: 71 FL (ref 82–98)
NONHDLC SERPL-MCNC: 107 MG/DL
PLATELET # BLD AUTO: 253 THOUSANDS/UL (ref 149–390)
PMV BLD AUTO: 10.3 FL (ref 8.9–12.7)
POTASSIUM SERPL-SCNC: 4.1 MMOL/L (ref 3.5–5.3)
PROT SERPL-MCNC: 7.5 G/DL (ref 6.4–8.2)
RBC # BLD AUTO: 5.82 MILLION/UL (ref 3.88–5.62)
SODIUM SERPL-SCNC: 139 MMOL/L (ref 136–145)
TRIGL SERPL-MCNC: 36 MG/DL
TSH SERPL DL<=0.05 MIU/L-ACNC: 1.48 UIU/ML (ref 0.36–3.74)
WBC # BLD AUTO: 7.35 THOUSAND/UL (ref 4.31–10.16)

## 2021-03-26 PROCEDURE — 80053 COMPREHEN METABOLIC PANEL: CPT

## 2021-03-26 PROCEDURE — 85027 COMPLETE CBC AUTOMATED: CPT

## 2021-03-26 PROCEDURE — 36415 COLL VENOUS BLD VENIPUNCTURE: CPT

## 2021-03-26 PROCEDURE — 84443 ASSAY THYROID STIM HORMONE: CPT

## 2021-03-26 PROCEDURE — 80061 LIPID PANEL: CPT

## 2021-03-27 DIAGNOSIS — D50.9 IRON DEFICIENCY ANEMIA, UNSPECIFIED IRON DEFICIENCY ANEMIA TYPE: Primary | ICD-10-CM

## 2021-09-10 NOTE — TELEPHONE ENCOUNTER
Just letting you know---I did the GI ref    dk pt does not want to wait for CT, claims he is feeling better, he will see his physician @ the [facility].  Advised if condition worsens, to return to ED

## 2021-10-05 ENCOUNTER — IMMUNIZATIONS (OUTPATIENT)
Dept: FAMILY MEDICINE CLINIC | Facility: HOSPITAL | Age: 46
End: 2021-10-05

## 2021-10-05 DIAGNOSIS — Z23 ENCOUNTER FOR IMMUNIZATION: Primary | ICD-10-CM

## 2021-10-05 PROCEDURE — 0001A SARS-COV-2 / COVID-19 MRNA VACCINE (PFIZER-BIONTECH) 30 MCG: CPT

## 2021-10-05 PROCEDURE — 91300 SARS-COV-2 / COVID-19 MRNA VACCINE (PFIZER-BIONTECH) 30 MCG: CPT

## 2021-11-04 DIAGNOSIS — M25.50 ARTHRALGIA, UNSPECIFIED JOINT: ICD-10-CM

## 2021-11-04 DIAGNOSIS — R53.83 FATIGUE, UNSPECIFIED TYPE: Primary | ICD-10-CM

## 2021-11-05 ENCOUNTER — APPOINTMENT (OUTPATIENT)
Dept: LAB | Facility: HOSPITAL | Age: 46
End: 2021-11-05
Attending: INTERNAL MEDICINE
Payer: COMMERCIAL

## 2021-11-05 DIAGNOSIS — M25.50 ARTHRALGIA, UNSPECIFIED JOINT: ICD-10-CM

## 2021-11-05 DIAGNOSIS — R53.83 FATIGUE, UNSPECIFIED TYPE: ICD-10-CM

## 2021-11-05 DIAGNOSIS — D50.9 IRON DEFICIENCY ANEMIA, UNSPECIFIED IRON DEFICIENCY ANEMIA TYPE: ICD-10-CM

## 2021-11-05 LAB
ALBUMIN SERPL BCP-MCNC: 3.8 G/DL (ref 3.5–5)
ALP SERPL-CCNC: 85 U/L (ref 46–116)
ALT SERPL W P-5'-P-CCNC: 29 U/L (ref 12–78)
ANION GAP SERPL CALCULATED.3IONS-SCNC: 6 MMOL/L (ref 4–13)
AST SERPL W P-5'-P-CCNC: 14 U/L (ref 5–45)
BILIRUB SERPL-MCNC: 0.53 MG/DL (ref 0.2–1)
BUN SERPL-MCNC: 18 MG/DL (ref 5–25)
CALCIUM SERPL-MCNC: 9.5 MG/DL (ref 8.3–10.1)
CCP AB SER IA-ACNC: 1.6
CHLORIDE SERPL-SCNC: 105 MMOL/L (ref 100–108)
CO2 SERPL-SCNC: 26 MMOL/L (ref 21–32)
CREAT SERPL-MCNC: 0.87 MG/DL (ref 0.6–1.3)
CRP SERPL QL: <3 MG/L
ERYTHROCYTE [DISTWIDTH] IN BLOOD BY AUTOMATED COUNT: 14.8 % (ref 11.6–15.1)
ERYTHROCYTE [SEDIMENTATION RATE] IN BLOOD: 16 MM/HOUR (ref 0–14)
FERRITIN SERPL-MCNC: 77 NG/ML (ref 8–388)
GFR SERPL CREATININE-BSD FRML MDRD: 105 ML/MIN/1.73SQ M
GLUCOSE P FAST SERPL-MCNC: 98 MG/DL (ref 65–99)
HCT VFR BLD AUTO: 42.2 % (ref 36.5–49.3)
HGB BLD-MCNC: 13 G/DL (ref 12–17)
IRON SATN MFR SERPL: 26 % (ref 20–50)
IRON SERPL-MCNC: 78 UG/DL (ref 65–175)
MAGNESIUM SERPL-MCNC: 2.2 MG/DL (ref 1.6–2.6)
MCH RBC QN AUTO: 21.7 PG (ref 26.8–34.3)
MCHC RBC AUTO-ENTMCNC: 30.8 G/DL (ref 31.4–37.4)
MCV RBC AUTO: 71 FL (ref 82–98)
PLATELET # BLD AUTO: 240 THOUSANDS/UL (ref 149–390)
PMV BLD AUTO: 10.1 FL (ref 8.9–12.7)
POTASSIUM SERPL-SCNC: 3.8 MMOL/L (ref 3.5–5.3)
PROT SERPL-MCNC: 7.8 G/DL (ref 6.4–8.2)
RBC # BLD AUTO: 5.98 MILLION/UL (ref 3.88–5.62)
RHEUMATOID FACT SER QL LA: NEGATIVE
SODIUM SERPL-SCNC: 137 MMOL/L (ref 136–145)
TIBC SERPL-MCNC: 296 UG/DL (ref 250–450)
WBC # BLD AUTO: 8.15 THOUSAND/UL (ref 4.31–10.16)

## 2021-11-05 PROCEDURE — 36415 COLL VENOUS BLD VENIPUNCTURE: CPT

## 2021-11-05 PROCEDURE — 86430 RHEUMATOID FACTOR TEST QUAL: CPT

## 2021-11-05 PROCEDURE — 82728 ASSAY OF FERRITIN: CPT

## 2021-11-05 PROCEDURE — 86618 LYME DISEASE ANTIBODY: CPT

## 2021-11-05 PROCEDURE — 83550 IRON BINDING TEST: CPT

## 2021-11-05 PROCEDURE — 83540 ASSAY OF IRON: CPT

## 2021-11-05 PROCEDURE — 83735 ASSAY OF MAGNESIUM: CPT

## 2021-11-05 PROCEDURE — 82306 VITAMIN D 25 HYDROXY: CPT

## 2021-11-05 PROCEDURE — 85027 COMPLETE CBC AUTOMATED: CPT

## 2021-11-05 PROCEDURE — 86038 ANTINUCLEAR ANTIBODIES: CPT

## 2021-11-05 PROCEDURE — 86140 C-REACTIVE PROTEIN: CPT

## 2021-11-05 PROCEDURE — 86200 CCP ANTIBODY: CPT

## 2021-11-05 PROCEDURE — 80053 COMPREHEN METABOLIC PANEL: CPT

## 2021-11-05 PROCEDURE — 85652 RBC SED RATE AUTOMATED: CPT

## 2021-11-06 LAB — B BURGDOR IGG+IGM SER-ACNC: 38

## 2021-11-08 LAB — RYE IGE QN: NEGATIVE

## 2021-11-10 LAB
25(OH)D2 SERPL-MCNC: <1 NG/ML
25(OH)D3 SERPL-MCNC: 26 NG/ML
25(OH)D3+25(OH)D2 SERPL-MCNC: 26 NG/ML

## 2022-01-10 ENCOUNTER — OFFICE VISIT (OUTPATIENT)
Dept: FAMILY MEDICINE CLINIC | Facility: HOSPITAL | Age: 47
End: 2022-01-10
Payer: COMMERCIAL

## 2022-01-10 VITALS
SYSTOLIC BLOOD PRESSURE: 118 MMHG | DIASTOLIC BLOOD PRESSURE: 70 MMHG | OXYGEN SATURATION: 99 % | WEIGHT: 151 LBS | HEIGHT: 67 IN | RESPIRATION RATE: 16 BRPM | BODY MASS INDEX: 23.7 KG/M2 | HEART RATE: 77 BPM

## 2022-01-10 DIAGNOSIS — E55.9 VITAMIN D DEFICIENCY: ICD-10-CM

## 2022-01-10 DIAGNOSIS — Z13.6 SCREENING FOR CARDIOVASCULAR CONDITION: ICD-10-CM

## 2022-01-10 DIAGNOSIS — H90.0 CONDUCTIVE HEARING LOSS, BILATERAL: ICD-10-CM

## 2022-01-10 DIAGNOSIS — Z00.00 ANNUAL PHYSICAL EXAM: Primary | ICD-10-CM

## 2022-01-10 DIAGNOSIS — H61.23 BILATERAL IMPACTED CERUMEN: ICD-10-CM

## 2022-01-10 PROCEDURE — 69210 REMOVE IMPACTED EAR WAX UNI: CPT | Performed by: INTERNAL MEDICINE

## 2022-01-10 PROCEDURE — 99396 PREV VISIT EST AGE 40-64: CPT | Performed by: INTERNAL MEDICINE

## 2022-01-10 PROCEDURE — 3725F SCREEN DEPRESSION PERFORMED: CPT | Performed by: INTERNAL MEDICINE

## 2022-01-10 PROCEDURE — 1036F TOBACCO NON-USER: CPT | Performed by: INTERNAL MEDICINE

## 2022-01-10 PROCEDURE — 3008F BODY MASS INDEX DOCD: CPT | Performed by: INTERNAL MEDICINE

## 2022-01-10 NOTE — PATIENT INSTRUCTIONS

## 2022-01-10 NOTE — PROGRESS NOTES
[facility] PRIMARY CARE [address]    NAME: Tashia Wright  AGE: 42 y o  SEX: male  : 1975     DATE: 1/10/2022     Assessment and Plan:     Problem List Items Addressed This Visit        Other    Vitamin D deficiency    Relevant Orders    Vitamin D 25 hydroxy      Other Visit Diagnoses     Annual physical exam    -  Primary    Screening for cardiovascular condition        Relevant Orders    Lipid panel    Bilateral impacted cerumen        Conductive hearing loss, bilateral              Immunizations and preventive care screenings were discussed with patient today  Appropriate education was printed on patient's after visit summary  Counseling:  · Exercise: the importance of regular exercise/physical activity was discussed  Recommend exercise 3-5 times per week for at least 30 minutes  Depression Screening and Follow-up Plan: Patient was screened for depression during today's encounter  They screened negative with a PHQ-2 score of 0  Return in about 1 year (around 1/10/2023) for Annual physical      Chief Complaint:     Chief Complaint   Patient presents with    Annual Exam      History of Present Illness:     Adult Annual Physical   Patient here for a comprehensive physical exam  The patient reports problems - decreased hearing b/l, hip and shoulder pains are better after patient started to take vitamin d supplements for vitamin d deficience  Diet and Physical Activity  · Diet/Nutrition: well balanced diet  · Exercise: vigorous cardiovascular exercise  Depression Screening  PHQ-2/9 Depression Screening    Little interest or pleasure in doing things: 0 - not at all  Feeling down, depressed, or hopeless: 0 - not at all  PHQ-2 Score: 0  PHQ-2 Interpretation: Negative depression screen       General Health  · Sleep: sleeps well  · Hearing: decreased - bilateral   · Vision: wears glasses     · Dental: regular dental visits   Health  · Symptoms include: none     Review of Systems:     Review of Systems   HENT: Positive for hearing loss  Eyes: Negative for visual disturbance  Respiratory: Negative for shortness of breath  Cardiovascular: Negative for chest pain  Gastrointestinal: Negative for abdominal pain and blood in stool  Genitourinary: Negative for difficulty urinating and hematuria  Musculoskeletal: Negative for arthralgias  Psychiatric/Behavioral: Negative for dysphoric mood  Past Medical History:     Past Medical History:   Diagnosis Date    GERD (gastroesophageal reflux disease)     PVC (premature ventricular contraction) resolved    Tenosynovitis of wrist       Past Surgical History:     Past Surgical History:   Procedure Laterality Date    COLONOSCOPY      ESOPHAGOGASTRODUODENOSCOPY N/A 10/20/2016    Procedure: ESOPHAGOGASTRODUODENOSCOPY (EGD); Surgeon: Kwasi Bourgeois MD;  Location: [facility];   Service:     FEMUR SURGERY      Femur repair    VASECTOMY        Family History:     Family History   Problem Relation Age of Onset    Leukemia Mother     Corrales's esophagus Father     Coronary artery disease Father     Diabetes Father     Heart disease Father     Substance Abuse Neg Hx     Mental illness Neg Hx       Social History:     Social History     Socioeconomic History    Marital status: /Civil Union     Spouse name: None    Number of children: None    Years of education: None    Highest education level: None   Occupational History    None   Tobacco Use    Smoking status: Never Smoker    Smokeless tobacco: Never Used   Vaping Use    Vaping Use: Never used   Substance and Sexual Activity    Alcohol use: Yes     Comment: occasional wine    Drug use: No    Sexual activity: None   Other Topics Concern    None   Social History Narrative    No alcohol use - As per [employer]     Wears seatbelt    Regular dental care    Limited exercise    No living will Social Determinants of Health     Financial Resource Strain: Not on file   Food Insecurity: Not on file   Transportation Needs: Not on file   Physical Activity: Not on file   Stress: Not on file   Social Connections: Not on file   Intimate Partner Violence: Not on file   Housing Stability: Not on file      Current Medications:     Current Outpatient Medications   Medication Sig Dispense Refill    Calcium Carb-Cholecalciferol (CALCIUM 600 + D PO) Take by mouth 1 daily      Multiple Vitamin (MULTIVITAMIN) tablet Take 1 tablet by mouth daily   omeprazole (PriLOSEC) 40 MG capsule Take 1 capsule (40 mg total) by mouth daily for 30 days (Patient not taking: Reported on 1/10/2022 ) 30 capsule 3     No current facility-administered medications for this visit  Allergies: Allergies   Allergen Reactions    Amoxicillin-Pot Clavulanate GI Intolerance      Physical Exam:     /70   Pulse 77   Resp 16   Ht 5' 7" (1 702 m)   Wt 68 5 kg (151 lb)   SpO2 99%   BMI 23 65 kg/m²     Physical Exam  Constitutional:       Appearance: He is well-developed  HENT:      Head: Normocephalic  Right Ear: There is impacted cerumen  Left Ear: There is impacted cerumen  Eyes:      Conjunctiva/sclera: Conjunctivae normal    Cardiovascular:      Rate and Rhythm: Normal rate and regular rhythm  Heart sounds: No murmur heard  Pulmonary:      Effort: No respiratory distress  Breath sounds: No wheezing or rales  Abdominal:      Tenderness: There is no abdominal tenderness  Musculoskeletal:      Cervical back: Neck supple  Lymphadenopathy:      Cervical: No cervical adenopathy  Skin:     General: Skin is warm and dry  Findings: No erythema  Neurological:      Mental Status: He is alert and oriented to person, place, and time  Motor: No weakness        Gait: Gait normal    Psychiatric:         Mood and Affect: Mood normal        Ear cerumen removal    Date/Time: 1/10/2022 9:59 AM  Performed by: Ralph Guillory MD  Authorized by: Ralph Guillory MD   Universal Protocol:  Consent: Verbal consent obtained  Risks and benefits: risks, benefits and alternatives were discussed  Consent given by: patient  Timeout called at: 1/10/2022 9:59 AM   Patient identity confirmed: verbally with patient      Patient location:  Clinic  Procedure details:     Local anesthetic:  None    Location:  L ear and R ear    Procedure type: irrigation with instrumentation      Instrumentation: curette      Approach:  External  Post-procedure details:     Complication:  None    Hearing quality:  Improved    Patient tolerance of procedure:   Tolerated well, no immediate complications      Ralph Guillory MD  [facility] [address]

## 2022-04-29 ENCOUNTER — APPOINTMENT (OUTPATIENT)
Dept: LAB | Facility: HOSPITAL | Age: 47
End: 2022-04-29

## 2022-04-29 ENCOUNTER — APPOINTMENT (OUTPATIENT)
Dept: LAB | Facility: HOSPITAL | Age: 47
End: 2022-04-29
Attending: INTERNAL MEDICINE
Payer: COMMERCIAL

## 2022-04-29 DIAGNOSIS — E55.9 VITAMIN D DEFICIENCY: ICD-10-CM

## 2022-04-29 DIAGNOSIS — Z13.6 SCREENING FOR CARDIOVASCULAR CONDITION: ICD-10-CM

## 2022-04-29 DIAGNOSIS — Z00.8 ENCOUNTER FOR OTHER GENERAL EXAMINATION: ICD-10-CM

## 2022-04-29 LAB
25(OH)D3 SERPL-MCNC: 78.4 NG/ML (ref 30–100)
CHOLEST SERPL-MCNC: 158 MG/DL
EST. AVERAGE GLUCOSE BLD GHB EST-MCNC: 108 MG/DL
HBA1C MFR BLD: 5.4 %
HDLC SERPL-MCNC: 61 MG/DL
LDLC SERPL CALC-MCNC: 81 MG/DL (ref 0–100)
NONHDLC SERPL-MCNC: 97 MG/DL
TRIGL SERPL-MCNC: 80 MG/DL

## 2022-04-29 PROCEDURE — 82306 VITAMIN D 25 HYDROXY: CPT

## 2022-04-29 PROCEDURE — 83036 HEMOGLOBIN GLYCOSYLATED A1C: CPT

## 2022-04-29 PROCEDURE — 36415 COLL VENOUS BLD VENIPUNCTURE: CPT

## 2022-04-29 PROCEDURE — 80061 LIPID PANEL: CPT

## 2022-05-08 DIAGNOSIS — B34.9 VIRAL INFECTION, UNSPECIFIED: Primary | ICD-10-CM

## 2022-05-08 PROCEDURE — 87636 SARSCOV2 & INF A&B AMP PRB: CPT | Performed by: INTERNAL MEDICINE

## 2022-05-08 NOTE — PROGRESS NOTES
Pt developed fever as high as 103, cough, sore throat, chills over the weekend  Will test him for covid and flu  I advise him to stay at home till results are back

## 2022-05-09 LAB
FLUAV RNA RESP QL NAA+PROBE: NEGATIVE
FLUBV RNA RESP QL NAA+PROBE: NEGATIVE
SARS-COV-2 RNA RESP QL NAA+PROBE: POSITIVE

## 2022-05-10 ENCOUNTER — TELEMEDICINE (OUTPATIENT)
Dept: FAMILY MEDICINE CLINIC | Facility: HOSPITAL | Age: 47
End: 2022-05-10
Payer: COMMERCIAL

## 2022-05-10 VITALS — HEIGHT: 67 IN | WEIGHT: 151 LBS | BODY MASS INDEX: 23.7 KG/M2 | TEMPERATURE: 99.6 F

## 2022-05-10 DIAGNOSIS — U07.1 COVID-19 VIRUS INFECTION: Primary | ICD-10-CM

## 2022-05-10 PROCEDURE — 99213 OFFICE O/P EST LOW 20 MIN: CPT | Performed by: INTERNAL MEDICINE

## 2022-05-10 PROCEDURE — 1036F TOBACCO NON-USER: CPT | Performed by: INTERNAL MEDICINE

## 2022-05-10 PROCEDURE — 3008F BODY MASS INDEX DOCD: CPT | Performed by: INTERNAL MEDICINE

## 2022-05-10 NOTE — PROGRESS NOTES
COVID-19 Outpatient Progress Note    Assessment/Plan:    Problem List Items Addressed This Visit     None      Visit Diagnoses     COVID-19 virus infection    -  Primary         Disposition:     I recommended self-quarantine for 10 days and to watch for symptoms until 14 days after exposure  If patient were to develop symptoms, they should self isolate and call our office for further guidance  I have spent 15 minutes directly with the patient  Greater than 50% of this time was spent in counseling/coordination of care regarding: risks and benefits of treatment options and instructions for management  Pt is not ready to return to work 5 days after symptoms onset, he still has fevers though decreasing and is weak  Will continue symptomatic therapy with dayquil, tylenol, increased fluid intake, rest  Tentative return to work on 5/16  Encounter provider Brisa Sharma MD    Provider located at [address] [place] [id number]    Recent Visits  No visits were found meeting these conditions  Showing recent visits within past 7 days and meeting all other requirements  Today's Visits  Date Type Provider Dept   05/10/22 Bruce Shah MD [facility] Primary [facility]   Showing today's visits and meeting all other requirements  Future Appointments  No visits were found meeting these conditions  Showing future appointments within next 150 days and meeting all other requirements     This virtual check-in was done via [employer] and patient was informed that this is a secure, HIPAA-compliant platform  He agrees to proceed  Patient agrees to participate in a virtual check in via telephone or video visit instead of presenting to the office to address urgent/immediate medical needs  Patient is aware this is a billable service      After connecting through [facility], the patient was identified by name and date of birth  Xu Silva was informed that this was a telemedicine visit and that the exam was being conducted confidentially over secure lines  My office door was closed  No one else was in the room  Xu Silva acknowledged consent and understanding of privacy and security of the telemedicine visit  I informed the patient that I have reviewed his record in Epic and presented the opportunity for him to ask any questions regarding the visit today  The patient agreed to participate  Verification of patient location:  Patient is located in the following state in which I hold an active license: [place]    Subjective:   Xu Silva is a 42 y o  male who is concerned about COVID-19  Patient's symptoms include fever, chills, fatigue, malaise, nasal congestion, sore throat, cough, shortness of breath (with exertion but oxygen saturation is 98-99% on room air) and headache  Patient denies nausea, vomiting and diarrhea  - Date of symptom onset: 5/7/2022      COVID-19 vaccination status: Fully vaccinated with booster    Exposure:     Currently a healthcare worker that is involved in direct patient care?: Yes      Lab Results   Component Value Date    SARSCOV2 Positive (A) 05/08/2022    SARSCOV2 Lumiradx Sars-Cov-2 AG Test 04/07/2022    SARSCOV2 NOT DETECTED 04/06/2022     Past Medical History:   Diagnosis Date    GERD (gastroesophageal reflux disease)     PVC (premature ventricular contraction) resolved    Tenosynovitis of wrist      Past Surgical History:   Procedure Laterality Date    COLONOSCOPY      ESOPHAGOGASTRODUODENOSCOPY N/A 10/20/2016    Procedure: ESOPHAGOGASTRODUODENOSCOPY (EGD); Surgeon: Alexandra Sun MD;  [facility]: [facility];   Service:     FEMUR SURGERY      Femur repair    VASECTOMY       Current Outpatient Medications   Medication Sig Dispense Refill    Calcium Carb-Cholecalciferol (CALCIUM 600 + D PO) Take by mouth 1 daily      Multiple Vitamin (MULTIVITAMIN) tablet Take 1 tablet by mouth daily   omeprazole (PriLOSEC) 40 MG capsule Take 1 capsule (40 mg total) by mouth daily for 30 days (Patient not taking: Reported on 1/10/2022 ) 30 capsule 3     No current facility-administered medications for this visit  Allergies   Allergen Reactions    Amoxicillin-Pot Clavulanate GI Intolerance       Review of Systems   Constitutional: Positive for chills, fatigue and fever  HENT: Positive for congestion and sore throat  Respiratory: Positive for cough and shortness of breath (with exertion but oxygen saturation is 98-99% on room air)  Gastrointestinal: Negative for diarrhea, nausea and vomiting  Neurological: Positive for headaches  Objective:    Vitals:    05/10/22 1330   Temp: 99 6 °F (37 6 °C)   TempSrc: Tympanic   Weight: 68 5 kg (151 lb)   Height: 5' 7" (1 702 m)       Physical Exam  Constitutional:       General: He is not in acute distress  Appearance: He is ill-appearing  Pulmonary:      Effort: Pulmonary effort is normal  No respiratory distress  Neurological:      Mental Status: He is alert and oriented to person, place, and time  Cranial Nerves: No cranial nerve deficit  Motor: No weakness  VIRTUAL VISIT DISCLAIMER    Sharonda Awan verbally agrees to participate in [employer]  Pt is aware that Virtual Care Services could be limited without vital signs or the ability to perform a full hands-on physical Ariane Gilbert understands he or the provider may request at any time to terminate the video visit and request the patient to seek care or treatment in person

## 2022-05-10 NOTE — LETTER
May 10, 2022     Patient: Hugo Contreras  YOB: 1975  Date of Visit: 5/10/2022      To Whom it May Concern:    Hugo Contreras is under my professional care  Phillip Salas was seen in my office on 5/10/2022 via virtual video visit  Phillip Salas may return to work on 5/16/2022  If you have any questions or concerns, please don't hesitate to call           Sincerely,          Yadiel Block MD        CC: Hugo Schultznd

## 2022-11-16 ENCOUNTER — OFFICE VISIT (OUTPATIENT)
Dept: OBGYN CLINIC | Facility: HOSPITAL | Age: 47
End: 2022-11-16

## 2022-11-16 ENCOUNTER — HOSPITAL ENCOUNTER (OUTPATIENT)
Dept: RADIOLOGY | Facility: HOSPITAL | Age: 47
Discharge: HOME | End: 2022-11-16
Attending: ORTHOPAEDIC SURGERY

## 2022-11-16 VITALS — HEIGHT: 67 IN | WEIGHT: 151 LBS | BODY MASS INDEX: 23.7 KG/M2

## 2022-11-16 DIAGNOSIS — T14.90XA INJURY: Primary | ICD-10-CM

## 2022-11-16 DIAGNOSIS — S62.515A CLOSED NONDISPLACED FRACTURE OF PROXIMAL PHALANX OF LEFT THUMB, INITIAL ENCOUNTER: Primary | ICD-10-CM

## 2022-11-16 DIAGNOSIS — T14.90XA INJURY: ICD-10-CM

## 2022-11-16 NOTE — PROGRESS NOTES
ASSESSMENT/PLAN:    Assessment:   Left thumb nondisplaced base proximal phalanx fracture    Plan:   Patient was placed into a thumb spica splint today  Can take Tylenol and anti-inflammatories as needed for pain   He will follow up as needed    Follow Up:  As needed    To Do Next Visit:  Re-evaluation    _____________________________________________________  CHIEF COMPLAINT:  Left thumb pain, right wrist pain      SUBJECTIVE:  Naeem Gibbons is a 41 y o  male who presents left thumb pain and right wrist pain  Patient states that he had a fall on 11/15/2022  He notes pain over the ulnar aspect of his wrist hand over the radial aspect of the left thumb  He states that he had a splint at home which he used for support  He denies any numbness or tingling  Patient has a history of previous injury during residency of the lunotriquetral ligament  REVIEW OF SYSTEMS:  Pertinent items are noted in HPI    A comprehensive review of systems was negative   _____________________________________________________  PHYSICAL EXAMINATION:  Vital signs: Ht 5' 7" (1 702 m)   Wt 68 5 kg (151 lb)   BMI 23 65 kg/m²   General: well developed and well nourished, alert, oriented times 3 and appears comfortable  Psychiatric: Normal  HEENT: Trachea Midline, No torticollis  Cardiovascular: No discernable arrhythmia  Pulmonary: No wheezing or stridor  Abdomen: No rebound or guarding  Extremities: No peripheral edema  Skin: No masses, erythema, lacerations, fluctation, ulcerations  Neurovascular: Sensation Intact to the Median, Ulnar, Radial Nerve, Motor Intact to the Median, Ulnar, Radial Nerve and Pulses Intact    MUSCULOSKELETAL EXAMINATION:  Right wrist  No erythema edema or ecchymosis noted, skin is warm to touch   Tenderness to palpation over the ulnar aspect of his wrist  Pain with ulnar deviation  He has full range of motion with wrist extension and flexion but does note pain  Negative Shuck test  DRUJ is stable with testing    Left thumb  No erythema edema or ecchymosis noted, skin is warm to touch  Tenderness to palpation over the proximal phalanx radial side  He has full range of motion at the IP joint  Tenderness with range of motion at the MCP joint   Negative Gamekeeper's thumb for UCL tear   Pain with RCL testing, minimal laxity noted  Patient is neurovascularly intact    _____________________________________________________  STUDIES REVIEWED:  X-rays of the right hand demonstrate no acute fractures or dislocations, previous injury to the lunate triquetral ligament appreciated with osteophyte noted on the lateral view  X-rays of the left hand demonstrated fracture noted at the base of the proximal phalanx of the thumb which is nondisplaced        PROCEDURES PERFORMED:  Procedures  No Procedures performed today

## 2023-01-10 ENCOUNTER — OFFICE VISIT (OUTPATIENT)
Age: 48
End: 2023-01-10

## 2023-01-10 ENCOUNTER — PREP FOR PROCEDURE (OUTPATIENT)
Age: 48
End: 2023-01-10

## 2023-01-10 ENCOUNTER — NURSE TRIAGE (OUTPATIENT)
Age: 48
End: 2023-01-10

## 2023-01-10 VITALS — WEIGHT: 155 LBS | BODY MASS INDEX: 24.33 KG/M2 | HEIGHT: 67 IN

## 2023-01-10 DIAGNOSIS — K62.5 RECTAL BLEEDING: Primary | ICD-10-CM

## 2023-01-10 NOTE — TELEPHONE ENCOUNTER
DE OV 1/10/2023 rectal bleeding Last fc 4/10/2015 WNL    SLBO DE 2/10/2023     Handed pt information  Additional Information  • Negative: Is this a new patient under the age of 48? • Negative: Is this a patient over the age of 76 that has never had a colonoscopy? • Negative: Is this patient over the age of [de-identified] with a history of cancer and/or polyps? • Negative: Has the patient had a colonoscopy within the last year and when was it? • Affirmative: Does the patient have a family history of colon or rectal cancer? • Negative: Does the patient experience chest pain or shortness of breath when climbing stairs? • Negative: Does the patient require oxygen support? • Negative: Has the patient had a cardiac procedure within the last year? • Negative: Has the patient had coronary artery disease (CAD) with stents or myocardial infarction in the last three months? • Negative: Does the patient have ongoing cardiac ischemia, severe valve dysfunction, or severe reduction of ejection fraction? • Negative: Has the patient had a stroke or blood clots? • Negative: Has the patient had a transient ischemic attack (TIA) or cerebrovascular accident (CVA) in the last three months? • Negative: Is the patient currently on any blood thinners such as Coumadin, Plavix, Eliquis, Pradaxa, Xarelto, etc?  (Check the patient's current medication list and document reason for taking medication)  • Negative: Has the patient had a knee or hip replacement within the last 6 months that required antibiotic coverage prior to the procedure? • Negative: Advised Patient - Initial Assessment  1  Patient advised that our office requires 72 hours notice for a cancellation of a procedure to avoid incurring a missed appointment fee  2  Asked patient to please contact insurance company to ask how they will be processing the individual claim for the procedure    3  Advised patient that he is welcome to see the doctor in the office prior to the procedure  4  Advised patient to be at the location of the procedure at 30 minutes prior to the scheduled time      Protocols used: MELISA AMB CRC COLONOSCOPY SCHEDULING

## 2023-01-10 NOTE — LETTER
[address]  [facility], [place] [address]      FLEXIBLE COLONOSCOPY INSTRUCTIONS  PLEASE NOTE    AS OF JUNE 1, 2014, OUR OFFICE REQUIRES 72 HOURS NOTICE OF CANCELLATION/RESCHEDULE OF A PROCEDURE TO AVOID INCURRING A MISSED APPOINTMENT FEE  Your Colonoscopy Procedure has been scheduled at:[facility]  [address] , [facility], [address] [facility]     The Date of your Procedure is: February 10, 2023  The hospital facility will contact you the evening prior to your scheduled procedure with your arrival time  Use the bowel preparation as directed  Check with your family doctor if you are taking a blood thinner (Coumadin, Plavix, Xarelto, Pradaxa, Gingko biloba, Ginseng, Feverfew, St  Parminder's Wort)  We suggest stopping these for 3 days  Special instructions may be needed if you are taking aspirin or any aspirin-containing medication  Check with your family physician  If you are on DIABETIC MEDICATION (tablets or insulin) your doctor may make changes in your preparation  Take all medications usual unless otherwise instructed  As always, if you have any questions or concerns, feel free to call the office  Our  staff will be happy to connect you with one of the nurses to answer any questions or address any concerns you have regarding your procedure  Do not wear any jewelry the day of your procedure including wedding rings  Arrangements must be made for a responsible party to drive you home from the procedure  If you do not have a responsible family member or friend to drive you home, the procedure will not be done  Vast or a taxi is not acceptable  It is important you notify our office of any insurance changes prior to your procedure and, if necessary, supply us with referrals from your primary care physician          COLONOSCOPY PREPARATION INSTRUCTIONS    Purchase (prescription not required):  · 238 gram bottle of Miralax® (Glycolax®)  · 4 Dulcolax® (Bisacodyl) Laxative Tablets  · 64 oz  bottle of Gatorade® or your preference of a non-carbonated clear liquid - NOT RED OR PURPLE     One Day Prior to Colonoscopy Procedure  · Nothing to eat the day before your procedure, only clear liquids  · It is important that you drink plenty of clear liquids throughout the day to prevent dehydration  Clear Liquids include:  o Water/Iced Tea/Lemonade/Gatorade®/Black Coffee or tea (no milk or creamers  o Soft drinks: orange, ginger ale, cola, Pepsi®, Sprite®, 7Up®  o Joel-Aid® (lemonade or orange flavors only)  o Strained fruit juices without pulp such as apple, white grape, white cranberry  o Jell-O®, lemon, lime or orange (no fruit or toppings)  o Popsicles, [place] Ice (No Ice Cream, sherbets, or fruit bars)  o Chicken or beef bouillon/broth  DO NOT EAT OR DRINK ANYTHING RED OR PURPLE  DO NOT DRINK ANY ALCOHOLIC BEVERAGES  DIABETIC PATIENTS: Consult your physician    At 4:00 pm, take (2) Dulcolax® (Bisacodyl) Laxative Tablets  Swallow the tablets whole with an 8 oz  glass of water  At 8:00 pm, take the additional (2) Dulcolax® (Bisacodyl) Laxative Tablets with 8 oz  of water  The package may direct you not to exceed (2) tablets at any time but for the purpose of this examination, you should take (4) total     Mix the 238 gm of Miralax® in 64 oz  of Gatorade® and shake the solution until the Miralax® is dissolved  You will drink half (32 oz) of this solution this evening, beginning between 4 and 6 o'clock  Drink 8 oz glassfuls at your own pace  It may take several hours to drink the solution  Remember to stay close to toilet facilities  DAY OF COLONOSCOPY PROCEDURE    Five (5) hours before your procedure, drink the other half (32 oz) of the Miralax®/Gatorade® mixture within a two (2) hour period  This may require you to get up very early if you are scheduled for an early procedure  NOTHING IS TO BE TAKEN BY MOUTH 3 HOURS PRIOR TO PROCEDURE       If you use an inhaler, please bring it with you to your procedure

## 2023-01-10 NOTE — PROGRESS NOTES
Colon and Rectal Surgery   Cristy Marc 41 y o  male MRN: [id number]   Encounter: [id number]  01/10/23   3:47 PM        ASSESSMENT:    53-year-old male, known to me for colonoscopy 2015  Had some bright red blood per rectum and clots recently  Digital rectal examination normal, external exam with a decompressing thrombosed external hemorrhoid in the left posterior lateral position  History of grandfather with polyps, he is due for age 41 colonoscopy screening  PLAN:  -High fiber diet 20-30g/day with increased fruits/vegetables/psyllium(metamucil or konsyl), increased hydration noncaffeinated beverages(handouts/samples provided)  -Warm soapy water, pat dry, desitin or calmoseptine to perianal skin as barrier  -Discontinue any wet wipes or hydrocortisone  -Colonoscopy scheduled      HPI  Cristy Marc is a 41 y o  male referred for evaluation of rectal bleeding  The patient reports on and off bright red rectal bleeding on the tissue paper with bowel movements for a couple weeks now, and notes an episode of bleeding without bowel movement yesterday  The patient admits to occasional anal itching and burning and an anal lump present for several years, as well as dull, inttermitent left lower quadrant abdominal pain and increased flatulence with new onset  The patient notes 1 bowel movement a day and states the consistency of the stool has changed recently becoming softer in nature, and thus, making it harder to clean himself after bowel movements, for which he has started using wet wipes  His most recent colonoscopy was on 04/10/2015, family history colon polyps grandfather      Historical Information   Past Medical History:   Diagnosis Date   • GERD (gastroesophageal reflux disease)    • PVC (premature ventricular contraction) resolved   • Tenosynovitis of wrist      Past Surgical History:   Procedure Laterality Date   • COLONOSCOPY     • ESOPHAGOGASTRODUODENOSCOPY N/A 10/20/2016    Procedure: ESOPHAGOGASTRODUODENOSCOPY (EGD); Surgeon: Jai Charles MD;  Location: [facility]; Service:    • FEMUR SURGERY      Femur repair   • VASECTOMY         Meds/Allergies       Current Outpatient Medications:   •  Calcium Carb-Cholecalciferol (CALCIUM 600 + D PO), Take by mouth 1 daily, Disp: , Rfl:   •  Multiple Vitamin (MULTIVITAMIN) tablet, Take 1 tablet by mouth daily  , Disp: , Rfl:   •  omeprazole (PriLOSEC) 40 MG capsule, Take 1 capsule (40 mg total) by mouth daily for 30 days (Patient not taking: Reported on 1/10/2022 ), Disp: 30 capsule, Rfl: 3      Allergies   Allergen Reactions   • Amoxicillin-Pot Clavulanate GI Intolerance         Social History   Social History     Substance and Sexual Activity   Alcohol Use Yes    Comment: occasional wine     Social History     Substance and Sexual Activity   Drug Use No     Social History     Tobacco Use   Smoking Status Never   Smokeless Tobacco Never         Family History:   Family History   Problem Relation Age of Onset   • Leukemia Mother    • Corrales's esophagus Father    • Coronary artery disease Father    • Diabetes Father    • Heart disease Father    • Substance Abuse Neg Hx    • Mental illness Neg Hx        Review of Systems   Constitutional: Negative  HENT: Negative  Eyes: Negative  Respiratory: Negative  Cardiovascular: Negative  Gastrointestinal: Positive for anal bleeding  Endocrine: Negative  Genitourinary: Negative  Musculoskeletal: Negative  Skin: Negative  Allergic/Immunologic: Negative  Neurological: Negative  Hematological: Negative  Psychiatric/Behavioral: Negative        Objective   Current Vitals:   Vitals:    01/10/23 1517   Weight: 70 3 kg (155 lb)   Height: 5' 7" (1 702 m)     Physical Exam:  General:no distress  Pulm:no increased work of breathing, clear bilateral  CV:sinus  Abdomen:soft,nontender  Rectal:normal perianal skin, left posterolateral decompressing external hemorrhoid, normal sphincter tone, no masses palpated  Extremities:no edema

## 2023-01-10 NOTE — PATIENT INSTRUCTIONS
ASSESSMENT:    45-year-old male, known to me for colonoscopy 2015  Had some bright red blood per rectum and clots recently  Digital rectal examination normal, external exam with a decompressing thrombosed external hemorrhoid in the left posterior lateral position  History of grandfather with polyps, he is due for age 41 colonoscopy screening      PLAN:  -High fiber diet 20-30g/day with increased fruits/vegetables/psyllium(metamucil or konsyl), increased hydration noncaffeinated beverages(handouts/samples provided)  -Warm soapy water, pat dry, desitin or calmoseptine to perianal skin as barrier  -Discontinue any wet wipes or hydrocortisone  -Colonoscopy scheduled

## 2023-01-24 ENCOUNTER — TELEPHONE (OUTPATIENT)
Dept: DERMATOLOGY | Facility: CLINIC | Age: 48
End: 2023-01-24

## 2023-02-24 ENCOUNTER — OFFICE VISIT (OUTPATIENT)
Dept: FAMILY MEDICINE CLINIC | Facility: HOSPITAL | Age: 48
End: 2023-02-24

## 2023-02-24 VITALS
BODY MASS INDEX: 23.86 KG/M2 | HEART RATE: 84 BPM | HEIGHT: 67 IN | RESPIRATION RATE: 16 BRPM | WEIGHT: 152 LBS | DIASTOLIC BLOOD PRESSURE: 66 MMHG | OXYGEN SATURATION: 97 % | SYSTOLIC BLOOD PRESSURE: 120 MMHG

## 2023-02-24 DIAGNOSIS — K21.9 GASTROESOPHAGEAL REFLUX DISEASE WITHOUT ESOPHAGITIS: ICD-10-CM

## 2023-02-24 DIAGNOSIS — Z00.00 ANNUAL PHYSICAL EXAM: ICD-10-CM

## 2023-02-24 DIAGNOSIS — Z13.1 SCREENING FOR DIABETES MELLITUS: ICD-10-CM

## 2023-02-24 DIAGNOSIS — H61.23 HEARING LOSS DUE TO CERUMEN IMPACTION, BILATERAL: ICD-10-CM

## 2023-02-24 DIAGNOSIS — E04.1 THYROID NODULE: ICD-10-CM

## 2023-02-24 DIAGNOSIS — R21 RASH AND NONSPECIFIC SKIN ERUPTION: Primary | ICD-10-CM

## 2023-02-24 DIAGNOSIS — Z13.6 SCREENING FOR CARDIOVASCULAR CONDITION: ICD-10-CM

## 2023-02-24 PROBLEM — K62.5 RECTAL BLEEDING: Status: RESOLVED | Noted: 2023-01-10 | Resolved: 2023-02-24

## 2023-02-24 NOTE — ASSESSMENT & PLAN NOTE
Patient has intermittent GERD  Takes omeprazole for a week maybe 3 times a year  Denies signs of GI bleeding    I will check his hemoglobin

## 2023-02-24 NOTE — ASSESSMENT & PLAN NOTE
Patient complains of decreased hearing  He has bilateral cerumen impaction  I could not visualize the tympanic membranes  I lavaged the left ear and remove the wax I removed the cerumen from the right ear with lavage and curette  Hearing improved after the procedure

## 2023-02-24 NOTE — ASSESSMENT & PLAN NOTE
Patient noticed a skin rash at the right posterolateral lower leg and bilateral nipples which is pruritic  It is worse in the winter months starting from October and better in the summer  It is recurrent  He has been using hydrocortisone cream for about 6 weeks on the right lower leg with improvement in the rash but when he stops the steroids then the rash with erythema and pruritus comes back  Suspect patient has eczema but I referred patient to dermatology for confirmation  Patient also has a eczematous appearing rash over the areolae  I felt no breast masses  We will ask dermatology to evaluate this as well    Patient denies discharge or bleeding from the nipples

## 2023-02-24 NOTE — ASSESSMENT & PLAN NOTE
Patient has thyroid nodule  Denies dysphagia    We will check his TSH and ultrasound of the thyroid gland

## 2023-02-24 NOTE — PROGRESS NOTES
[facility] [address]    NAME: Arabella Levi  AGE: 41 y o  SEX: male  : 1975     DATE: 2023     Assessment and Plan:     Problem List Items Addressed This Visit        Digestive    Gastroesophageal reflux disease without esophagitis     Patient has intermittent GERD  Takes omeprazole for a week maybe 3 times a year  Denies signs of GI bleeding  I will check his hemoglobin         Relevant Orders    CBC and Platelet       Endocrine    Thyroid nodule     Patient has thyroid nodule  Denies dysphagia  We will check his TSH and ultrasound of the thyroid gland         Relevant Orders    TSH, 3rd generation with Free T4 reflex    US thyroid       Nervous and Auditory    Hearing loss due to cerumen impaction, bilateral     Patient complains of decreased hearing  He has bilateral cerumen impaction  I could not visualize the tympanic membranes  I lavaged the left ear and remove the wax I removed the cerumen from the right ear with lavage and curette  Hearing improved after the procedure  Musculoskeletal and Integument    Rash and nonspecific skin eruption - Primary     Patient noticed a skin rash at the right posterolateral lower leg and bilateral nipples which is pruritic  It is worse in the winter months starting from October and better in the summer  It is recurrent  He has been using hydrocortisone cream for about 6 weeks on the right lower leg with improvement in the rash but when he stops the steroids then the rash with erythema and pruritus comes back  Suspect patient has eczema but I referred patient to dermatology for confirmation  Patient also has a eczematous appearing rash over the areolae  I felt no breast masses  We will ask dermatology to evaluate this as well    Patient denies discharge or bleeding from the nipples         Relevant Medications    hydrocortisone 2 5 % cream Other Relevant Orders    Ambulatory referral to Dermatology   Other Visit Diagnoses     Annual physical exam        Screening for diabetes mellitus        Relevant Orders    Comprehensive metabolic panel    Screening for cardiovascular condition        Relevant Orders    Lipid panel          Immunizations and preventive care screenings were discussed with patient today  Appropriate education was printed on patient's after visit summary  Counseling:  · Exercise: the importance of regular exercise/physical activity was discussed  Recommend exercise 3-5 times per week for at least 30 minutes  Depression Screening and Follow-up Plan: Patient was screened for depression during today's encounter  They screened negative with a PHQ-2 score of 0  Return in about 6 months (around 8/24/2023) for Recheck  Chief Complaint:     Chief Complaint   Patient presents with   • Annual Exam      History of Present Illness:     Adult Annual Physical   Patient here for a comprehensive physical exam  The patient reports problems - As above  Diet and Physical Activity  · Diet/Nutrition: well balanced diet  · Exercise: vigorous cardiovascular exercise  Depression Screening  PHQ-2/9 Depression Screening    Little interest or pleasure in doing things: 0 - not at all  Feeling down, depressed, or hopeless: 0 - not at all  PHQ-2 Score: 0  PHQ-2 Interpretation: Negative depression screen       General Health  · Sleep: sleeps well  · Hearing: decreased - bilateral   · Vision: wears glasses  · Dental: regular dental visits   Health  · Symptoms include: none     Review of Systems:     Review of Systems   Constitutional: Negative for fever  HENT: Positive for hearing loss  Negative for congestion  Eyes: Negative for visual disturbance  Respiratory: Negative for cough and shortness of breath  Cardiovascular: Negative for chest pain     Gastrointestinal: Negative for abdominal pain, blood in stool and diarrhea  Endocrine: Negative for polydipsia and polyphagia  Genitourinary: Negative for difficulty urinating and hematuria  Musculoskeletal: Negative for myalgias  Skin: Positive for rash  Neurological: Negative for headaches  Psychiatric/Behavioral: Negative for dysphoric mood  All other systems reviewed and are negative  Past Medical History:     Past Medical History:   Diagnosis Date   • GERD (gastroesophageal reflux disease)    • PVC (premature ventricular contraction) resolved   • Tenosynovitis of wrist       Past Surgical History:     Past Surgical History:   Procedure Laterality Date   • COLONOSCOPY     • ESOPHAGOGASTRODUODENOSCOPY N/A 10/20/2016    Procedure: ESOPHAGOGASTRODUODENOSCOPY (EGD); Surgeon: Che Juan MD;  Location:  GI LAB;   Service:    • FEMUR SURGERY      Femur repair   • VASECTOMY        Family History:     Family History   Problem Relation Age of Onset   • Leukemia Mother    • Corrales's esophagus Father    • Coronary artery disease Father    • Diabetes Father    • Heart disease Father    • Substance Abuse Neg Hx    • Mental illness Neg Hx       Social History:     Social History     Socioeconomic History   • Marital status: /Civil Union     Spouse name: None   • Number of children: None   • Years of education: None   • Highest education level: None   Occupational History   • None   Tobacco Use   • Smoking status: Never   • Smokeless tobacco: Never   Vaping Use   • Vaping Use: Never used   Substance and Sexual Activity   • Alcohol use: Yes     Comment: occasional wine   • Drug use: No   • Sexual activity: None   Other Topics Concern   • None   Social History Narrative    No alcohol use - As per [employer]     Wears seatbelt    Regular dental care    Limited exercise    No living will     Social Determinants of Health     Financial Resource Strain: Not on file   Food Insecurity: Not on file   Transportation Needs: Not on file   Physical Activity: Not on file Stress: Not on file   Social Connections: Not on file   Intimate Partner Violence: Not on file   Housing Stability: Not on file      Current Medications:     Current Outpatient Medications   Medication Sig Dispense Refill   • Calcium Carb-Cholecalciferol (CALCIUM 600 + D PO) Take by mouth 1 daily     • hydrocortisone 2 5 % cream Apply 1 application topically 2 (two) times a day To affected area     • Multiple Vitamin (MULTIVITAMIN) tablet Take 1 tablet by mouth daily  • omeprazole (PriLOSEC) 40 MG capsule Take 1 capsule (40 mg total) by mouth daily for 30 days (Patient taking differently: Take by mouth 1 daily prn) 30 capsule 3     No current facility-administered medications for this visit  Allergies: Allergies   Allergen Reactions   • Amoxicillin-Pot Clavulanate GI Intolerance      Physical Exam:     /66   Pulse 84   Resp 16   Ht 5' 7" (1 702 m)   Wt 68 9 kg (152 lb)   SpO2 97%   BMI 23 81 kg/m²       Physical Exam  Constitutional:       Appearance: He is well-developed  HENT:      Head: Normocephalic  Right Ear: Tympanic membrane normal       Left Ear: Tympanic membrane normal    Eyes:      Conjunctiva/sclera: Conjunctivae normal    Cardiovascular:      Rate and Rhythm: Normal rate and regular rhythm  Heart sounds: No murmur heard  Pulmonary:      Effort: No respiratory distress  Breath sounds: No wheezing or rales  Abdominal:      General: Bowel sounds are normal       Palpations: Abdomen is soft  Tenderness: There is no abdominal tenderness  Musculoskeletal:      Cervical back: Neck supple  Lymphadenopathy:      Cervical: No cervical adenopathy  Skin:     General: Skin is warm and dry  Findings: Rash (Please review the image I took of the right lower leg rash) present  Neurological:      Mental Status: He is alert and oriented to person, place, and time  Cranial Nerves: No cranial nerve deficit  Motor: No weakness        Gait: Gait normal    Psychiatric:         Mood and Affect: Mood normal          Thought Content: Thought content normal       Ear cerumen removal    Date/Time: 2/24/2023 2:10 PM  Performed by: Maame Cavazos MD  Authorized by: Maame Cavazos MD   Universal Protocol:  Consent: Verbal consent obtained  Risks and benefits: risks, benefits and alternatives were discussed  Consent given by: patient  Time out: Immediately prior to procedure a "time out" was called to verify the correct patient, procedure, equipment, support staff and site/side marked as required  Patient identity confirmed: verbally with patient      Patient location:  Clinic  Procedure details:     Location:  L ear and R ear    Procedure type: irrigation with instrumentation      Instrumentation: curette      Approach:  External  Post-procedure details:     Complication:  None    Hearing quality:  Normal    Patient tolerance of procedure:   Tolerated well, no immediate complications        Maame Cavazos MD  [facility] [address]

## 2023-04-25 PROBLEM — H61.23 HEARING LOSS DUE TO CERUMEN IMPACTION, BILATERAL: Status: RESOLVED | Noted: 2023-02-24 | Resolved: 2023-04-25

## 2023-05-04 ENCOUNTER — APPOINTMENT (OUTPATIENT)
Dept: LAB | Facility: HOSPITAL | Age: 48
End: 2023-05-04
Attending: INTERNAL MEDICINE

## 2023-05-04 ENCOUNTER — APPOINTMENT (OUTPATIENT)
Dept: LAB | Facility: HOSPITAL | Age: 48
End: 2023-05-04

## 2023-05-04 DIAGNOSIS — Z13.1 SCREENING FOR DIABETES MELLITUS: ICD-10-CM

## 2023-05-04 DIAGNOSIS — Z13.6 SCREENING FOR CARDIOVASCULAR CONDITION: ICD-10-CM

## 2023-05-04 DIAGNOSIS — K21.9 GASTROESOPHAGEAL REFLUX DISEASE WITHOUT ESOPHAGITIS: ICD-10-CM

## 2023-05-04 DIAGNOSIS — Z00.8 HEALTH EXAMINATION IN POPULATION SURVEY: ICD-10-CM

## 2023-05-04 DIAGNOSIS — E04.1 THYROID NODULE: ICD-10-CM

## 2023-05-04 LAB
ALBUMIN SERPL BCP-MCNC: 4.5 G/DL (ref 3.5–5)
ALP SERPL-CCNC: 75 U/L (ref 34–104)
ALT SERPL W P-5'-P-CCNC: 22 U/L (ref 7–52)
ANION GAP SERPL CALCULATED.3IONS-SCNC: 6 MMOL/L (ref 4–13)
AST SERPL W P-5'-P-CCNC: 18 U/L (ref 13–39)
BILIRUB SERPL-MCNC: 0.57 MG/DL (ref 0.2–1)
BUN SERPL-MCNC: 11 MG/DL (ref 5–25)
CALCIUM SERPL-MCNC: 9.5 MG/DL (ref 8.4–10.2)
CHLORIDE SERPL-SCNC: 102 MMOL/L (ref 96–108)
CHOLEST SERPL-MCNC: 166 MG/DL
CO2 SERPL-SCNC: 30 MMOL/L (ref 21–32)
CREAT SERPL-MCNC: 0.98 MG/DL (ref 0.6–1.3)
ERYTHROCYTE [DISTWIDTH] IN BLOOD BY AUTOMATED COUNT: 14.4 % (ref 11.6–15.1)
GFR SERPL CREATININE-BSD FRML MDRD: 92 ML/MIN/1.73SQ M
GLUCOSE P FAST SERPL-MCNC: 89 MG/DL (ref 65–99)
HCT VFR BLD AUTO: 42.8 % (ref 36.5–49.3)
HDLC SERPL-MCNC: 52 MG/DL
HGB BLD-MCNC: 13.2 G/DL (ref 12–17)
LDLC SERPL CALC-MCNC: 96 MG/DL (ref 0–100)
MCH RBC QN AUTO: 21.7 PG (ref 26.8–34.3)
MCHC RBC AUTO-ENTMCNC: 30.8 G/DL (ref 31.4–37.4)
MCV RBC AUTO: 70 FL (ref 82–98)
NONHDLC SERPL-MCNC: 114 MG/DL
PLATELET # BLD AUTO: 261 THOUSANDS/UL (ref 149–390)
PMV BLD AUTO: 10.1 FL (ref 8.9–12.7)
POTASSIUM SERPL-SCNC: 3.8 MMOL/L (ref 3.5–5.3)
PROT SERPL-MCNC: 8 G/DL (ref 6.4–8.4)
RBC # BLD AUTO: 6.09 MILLION/UL (ref 3.88–5.62)
SODIUM SERPL-SCNC: 138 MMOL/L (ref 135–147)
TRIGL SERPL-MCNC: 90 MG/DL
TSH SERPL DL<=0.05 MIU/L-ACNC: 1.86 UIU/ML (ref 0.45–4.5)
WBC # BLD AUTO: 8.26 THOUSAND/UL (ref 4.31–10.16)

## 2023-05-05 ENCOUNTER — HOSPITAL ENCOUNTER (OUTPATIENT)
Dept: RADIOLOGY | Facility: HOSPITAL | Age: 48
Discharge: HOME | End: 2023-05-05
Attending: STUDENT IN AN ORGANIZED HEALTH CARE EDUCATION/TRAINING PROGRAM

## 2023-05-05 ENCOUNTER — HOSPITAL ENCOUNTER (OUTPATIENT)
Dept: RADIOLOGY | Facility: HOSPITAL | Age: 48
Discharge: HOME | End: 2023-05-05
Attending: INTERNAL MEDICINE

## 2023-05-05 DIAGNOSIS — M25.512 CHRONIC LEFT SHOULDER PAIN: ICD-10-CM

## 2023-05-05 DIAGNOSIS — E04.1 THYROID NODULE: ICD-10-CM

## 2023-05-05 DIAGNOSIS — M25.512 CHRONIC LEFT SHOULDER PAIN: Primary | ICD-10-CM

## 2023-05-05 DIAGNOSIS — G89.29 CHRONIC LEFT SHOULDER PAIN: Primary | ICD-10-CM

## 2023-05-05 DIAGNOSIS — G89.29 CHRONIC LEFT SHOULDER PAIN: ICD-10-CM

## 2023-05-06 LAB
EST. AVERAGE GLUCOSE BLD GHB EST-MCNC: 105 MG/DL
HBA1C MFR BLD: 5.3 %

## 2023-05-10 DIAGNOSIS — R71.8 RBC MICROCYTOSIS: Primary | ICD-10-CM

## 2023-05-11 ENCOUNTER — EVALUATION (OUTPATIENT)
Dept: PHYSICAL THERAPY | Facility: CLINIC | Age: 48
End: 2023-05-11

## 2023-05-11 DIAGNOSIS — G89.29 CHRONIC LEFT SHOULDER PAIN: ICD-10-CM

## 2023-05-11 DIAGNOSIS — M47.812 CERVICAL FACET SYNDROME: Primary | ICD-10-CM

## 2023-05-11 DIAGNOSIS — S43.432D LABRAL TEAR OF SHOULDER, LEFT, SUBSEQUENT ENCOUNTER: ICD-10-CM

## 2023-05-11 DIAGNOSIS — M25.512 CHRONIC LEFT SHOULDER PAIN: ICD-10-CM

## 2023-05-11 NOTE — PROGRESS NOTES
PT Evaluation     Today's date: 2023  Patient name: Beatrice North  : 1975  MRN: [id number]  Referring provider: Brent Ramsey DO  Dx:   Encounter Diagnosis     ICD-10-CM    1  Cervical facet syndrome  M47 812       2  Labral tear of shoulder, left, subsequent encounter  S43 432D       3  Chronic left shoulder pain  M25 512 Ambulatory Referral to Physical Therapy    G89 29                      Assessment  Assessment details: Beatrice North is a 41 y o  male presenting to outpatient physical therapy at [place] [address] with complaints of L shoulder and R sided cervical pain  He presents with decreased cervical range of motion, decreased postural strength, limited flexibility, poor postural awareness, decreased tolerance to activity and decreased functional mobility due to cervical facet syndrome and L labral tear  His posterior chain muscles are weak as well as weakness in L SS  Mod more cervical R rotation post manual tx today  He is more aware of his postural needs now  He would benefit from skilled PT services in order to address these deficits and reach maximum level of function  Thank you for the referral!  Impairments: abnormal or restricted ROM, activity intolerance, impaired physical strength, lacks appropriate home exercise program, pain with function and poor posture   Barriers to therapy: None  Understanding of Dx/Px/POC: excellent  Goals  ST  Independent with HEP in 2 weeks  2  Increase cervical AROM to WNL all motions in 3 weeks   3  Excellent postural awareness in 2 weeks    LT  Able to complete a full work day without cervical or L UE pain in 8 weeks  2    Strength B traps, L SS = 5/5 in 8 weeks      Plan  Patient would benefit from: skilled PT and PT eval  Planned modality interventions: cryotherapy, TENS and thermotherapy: hydrocollator packs  Other planned modality interventions: laser  Planned therapy interventions: ADL retraining, flexibility, functional ROM exercises, home exercise program, joint mobilization, manual therapy, neuromuscular re-education, postural training, strengthening, stretching, therapeutic activities and therapeutic exercise  Frequency: 2x week  Duration in weeks: 8  Treatment plan discussed with: patient        Subjective Evaluation    History of Present Illness  Mechanism of injury: Pt reports having L shoulder pain for a few months and R sided cervical pain for 3 weeks  Was unable to turn his head fully to the R last week due to pain  L shoulder MRI from 5/5/23 was normal, but slight labral tear suspected by specialists  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[place] [address] and spends a lot of time hunched over a computer or patients during surgery  Not a recurrent problem   Quality of life: excellent    Pain  Progression: no change    Social Support  Lives with: spouse and young children    Employment status: working  Hand dominance: right    Treatments  No previous or current treatments  Patient Goals  Patient goals for therapy: increased strength, increased motion, decreased pain and independence with ADLs/IADLs          Objective     Static Posture     Head  Forward  Shoulders  Rounded  Postural Observations  Seated posture: poor  Standing posture: fair  Correction of posture: makes symptoms better        Palpation     Additional Palpation Details  Min atrophy L posterior deltoid, traps and rhomboids  Tenderness   Cervical Spine   Tenderness in the facet joint (R C4-7)       Neurological Testing     Sensation   Cervical/Thoracic   Left   Intact: light touch    Right   Intact: light touch    Active Range of Motion   Cervical/Thoracic Spine       Cervical    Flexion:  WFL  Extension:  Restriction level: minimal  Left lateral flexion:  WFL  Right lateral flexion:  with pain Restriction level moderate  Left rotation:  WFL  Right rotation:  with pain Restriction level: moderate  Left Shoulder   Normal active range of motion    Right Shoulder   Normal active range of motion    Joint Play     Hypomobile: C3, C4, C5, C6 and C7     Pain: C4 and C5     Strength/Myotome Testing     Left Shoulder     Planes of Motion   Flexion: 5   Extension: 5   Abduction: 4   External rotation at 0°: 5   Internal rotation at 0°: 5     Isolated Muscles   Middle trapezius: 4     Right Shoulder     Planes of Motion   Flexion: 5   Extension: 5   Abduction: 5   External rotation at 0°: 5   Internal rotation at 0°: 5     Isolated Muscles   Middle trapezius: 5     Tests     Left Shoulder   Positive empty can  Negative apprehension, Hawkin's and Neer's  Additional Tests Details  Mod instability with endrange L shoulder ER - + clunk        POC EXPIRES On:  7/6/23  PRECAUTIONS:  None  CO-MORBIDITES:  None  PERSONAL FACTORS:  None      Manuals HEP 5/11           Cervical rotational mobs  5'           STM R cervical paraspinals  4'           Suboccipital release  3'                        Neuro Re-Ed     Prone rows L/R  5/11            Prone mid traps L/R 5/11            Seated chin tucks with B scap retraction 5/11            TB Rows B 5/11            Wall angles 5/11            Supine chin tucks 5/11                         Ther Ex                                                                                                            Ther Activity                              Gait Training                              Modalities

## 2023-05-16 ENCOUNTER — TELEPHONE (OUTPATIENT)
Dept: DERMATOLOGY | Facility: CLINIC | Age: 48
End: 2023-05-16

## 2023-05-16 NOTE — TELEPHONE ENCOUNTER
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

## 2023-05-17 NOTE — TELEPHONE ENCOUNTER
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

## 2023-05-18 ENCOUNTER — OFFICE VISIT (OUTPATIENT)
Dept: PHYSICAL THERAPY | Facility: CLINIC | Age: 48
End: 2023-05-18

## 2023-05-18 DIAGNOSIS — M47.812 CERVICAL FACET SYNDROME: Primary | ICD-10-CM

## 2023-05-18 DIAGNOSIS — M25.512 CHRONIC LEFT SHOULDER PAIN: ICD-10-CM

## 2023-05-18 DIAGNOSIS — S43.432D LABRAL TEAR OF SHOULDER, LEFT, SUBSEQUENT ENCOUNTER: ICD-10-CM

## 2023-05-18 DIAGNOSIS — G89.29 CHRONIC LEFT SHOULDER PAIN: ICD-10-CM

## 2023-05-18 NOTE — PROGRESS NOTES
Daily Note     Today's date: 2023  Patient name: Glendy Schwartz  : 1975  MRN: [id number]  Referring provider: Lesly Corrales DO  Dx:   Encounter Diagnosis     ICD-10-CM    1  Cervical facet syndrome  M47 812       2  Labral tear of shoulder, left, subsequent encounter  S43 432D       3  Chronic left shoulder pain  M25 512     G89 29                      Subjective:  Pt reports feeling cervical soreness for a few days after IE, but much more cervical ROM too  Objective:  See treatment diary below      Assessment:  Pt presented to outpatient physical therapy at [place] [address] with complaints of L shoulder and R sided cervical pain  He presented with decreased cervical range of motion, decreased postural strength, limited flexibility, poor postural awareness, decreased tolerance to activity and decreased functional mobility due to cervical facet syndrome and L labral tear  His posterior chain muscles are weak as well as weakness in L SS  Mod more cervical R rotation today with more after treatment  He is more aware of his postural needs now  He will continue to benefit from skilled PT services in order to address these deficits and reach maximum level of function  Plan:  Add closed chain shoulder stability exercises on 23         POC EXPIRES On:  23  PRECAUTIONS:  None  CO-MORBIDITES:  None  PERSONAL FACTORS:  None      Manuals HEP           Cervical rotational mobs  5' 5'          STM R cervical paraspinals  4' 10'          Suboccipital release  3' 5'          Lateral glide cervical mobs   Grade 3           Neuro Re-Ed     Prone rows L/R              Prone mid traps L/R             Seated chin tucks with B scap retraction             TB Rows B             Wall angles             Supine chin tucks                          Ther Ex                                                                                                            Ther Activity                              Gait Training                              Modalities

## 2023-05-25 ENCOUNTER — OFFICE VISIT (OUTPATIENT)
Dept: PHYSICAL THERAPY | Facility: CLINIC | Age: 48
End: 2023-05-25

## 2023-05-25 DIAGNOSIS — M47.812 CERVICAL FACET SYNDROME: Primary | ICD-10-CM

## 2023-05-25 DIAGNOSIS — S43.432D LABRAL TEAR OF SHOULDER, LEFT, SUBSEQUENT ENCOUNTER: ICD-10-CM

## 2023-05-25 NOTE — PROGRESS NOTES
Daily Note     Today's date: 2023  Patient name: Bryan Darnell  : 1975  MRN: [id number]  Referring provider: Lulú Tijerina DO  Dx:   Encounter Diagnosis     ICD-10-CM    1  Cervical facet syndrome  M47 812       2  Labral tear of shoulder, left, subsequent encounter  S43 432D                      Subjective:  Pt reports feeling less cervical tightness this week  ROM is better  Objective:  See treatment diary below      Assessment:  Pt presented to outpatient physical therapy at [place] [address] with complaints of L shoulder and R sided cervical pain  He presented with decreased cervical range of motion, decreased postural strength, limited flexibility, poor postural awareness, decreased tolerance to activity and decreased functional mobility due to cervical facet syndrome and L labral tear  His posterior chain muscles are weak as well as weakness in L SS, but posture is improved since last week  Cervical tightness and jt gliding is also improved since last session with min less pain  He will continue to benefit from skilled PT services in order to address these deficits and reach maximum level of function  Plan:  Add closed chain shoulder stability exercises on 23         POC EXPIRES On:  23  PRECAUTIONS:  None  CO-MORBIDITES:  None  PERSONAL FACTORS:  None      Manuals HEP          Cervical rotational mobs  5' 5' 5'         STM R cervical paraspinals  4' 10' 10'         Suboccipital release  3' 5' 5'         Lateral glide cervical mobs   Grade 3 3' Grade 3 3'         Neuro Re-Ed     Prone rows L/R              Prone mid traps L/R             Seated chin tucks with B scap retraction             TB Rows B             Wall angles             Supine chin tucks             Wall push ups             Bird-dogs                          Ther Ex Ther Activity                              Gait Training                              Modalities

## 2023-06-07 ENCOUNTER — OFFICE VISIT (OUTPATIENT)
Age: 48
End: 2023-06-07
Payer: COMMERCIAL

## 2023-06-07 VITALS — BODY MASS INDEX: 23.79 KG/M2 | HEART RATE: 79 BPM | WEIGHT: 151.6 LBS | HEIGHT: 67 IN | OXYGEN SATURATION: 98 %

## 2023-06-07 DIAGNOSIS — M54.50 ACUTE RIGHT-SIDED LOW BACK PAIN WITHOUT SCIATICA: Primary | ICD-10-CM

## 2023-06-07 PROCEDURE — 97035 APP MDLTY 1+ULTRASOUND EA 15: CPT | Performed by: CHIROPRACTOR

## 2023-06-07 PROCEDURE — 98941 CHIROPRACT MANJ 3-4 REGIONS: CPT | Performed by: CHIROPRACTOR

## 2023-06-07 PROCEDURE — 99202 OFFICE O/P NEW SF 15 MIN: CPT | Performed by: CHIROPRACTOR

## 2023-06-13 NOTE — PROGRESS NOTES
HPI:  Back Pain  This is a new problem  The current episode started yesterday  The problem occurs intermittently  The problem has been waxing and waning since onset  The pain is present in the lumbar spine and sacro-iliac  The quality of the pain is described as aching and cramping  The pain does not radiate  The pain is moderate  The symptoms are aggravated by bending, position and twisting  He has tried ice and NSAIDs for the symptoms  The treatment provided mild relief  Vikash Garcia is a 41 y o  male who presents for eval and treatment for recent onset right-sided axial low back pain  He notes direct causation that he was working out for the first time in a little while with his son they were doing dead lifts remembers adding some light amount of weight when he went to engage the dead left felt a right-sided low back pull he discontinued after that time for lifting anymore  He awoke today with right-sided low back pain achiness stiffness and soreness upon arising out of a seated position he denies any radicular complaints  Relates no numbness or tingling weakness into the lower extremity  Reports no associated bowel bladder changes  Does have a past history of episodic low back pain usually self resolving  Relates no significant treatments since his onset of symptoms ibuprofen does provide some relief  Chief Complaint   Patient presents with   • Lower Back - Pain     Past Medical History:   Diagnosis Date   • GERD (gastroesophageal reflux disease)    • PVC (premature ventricular contraction) resolved   • Tenosynovitis of wrist       Past Surgical History:   Procedure Laterality Date   • COLONOSCOPY     • ESOPHAGOGASTRODUODENOSCOPY N/A 10/20/2016    Procedure: ESOPHAGOGASTRODUODENOSCOPY (EGD); Surgeon: Greg Briscoe MD;  Location: [facility];   Service:    • FEMUR SURGERY      Femur repair   • VASECTOMY       Family History   Problem Relation Age of Onset   • Leukemia Mother    • Corrales's esophagus Father    • Coronary artery disease Father    • Diabetes Father    • Heart disease Father    • Substance Abuse Neg Hx    • Mental illness Neg Hx      Social History     Socioeconomic History   • Marital status: /Civil Union     Spouse name: None   • Number of children: None   • Years of education: None   • Highest education level: None   Occupational History   • None   Tobacco Use   • Smoking status: Never   • Smokeless tobacco: Never   Vaping Use   • Vaping Use: Never used   Substance and Sexual Activity   • Alcohol use: Yes     Comment: occasional wine   • Drug use: No   • Sexual activity: None   Other Topics Concern   • None   Social History Narrative    No alcohol use - As per [employer]     Wears seatbelt    Regular dental care    Limited exercise    No living will     Social Determinants of Health     Financial Resource Strain: Not on file   Food Insecurity: Not on file   Transportation Needs: No Transportation Needs (1/7/2021)    PRAPARE - Transportation    • Lack of Transportation (Medical): No    • Lack of Transportation (Non-Medical): No   Physical Activity: Inactive (1/7/2021)    Exercise Vital Sign    • Days of Exercise per Week: 0 days    • Minutes of Exercise per Session: 0 min   Stress: Stress Concern Present (1/7/2021)    Rach7 Roderick Smith    • Feeling of Stress : Rather much   Social Connections: Not on file   Intimate Partner Violence: Not on file   Housing Stability: Not on file       Current Outpatient Medications:   •  Calcium Carb-Cholecalciferol (CALCIUM 600 + D PO), Take by mouth 1 daily, Disp: , Rfl:   •  hydrocortisone 2 5 % cream, Apply 1 application topically 2 (two) times a day To affected area, Disp: , Rfl:   •  Multiple Vitamin (MULTIVITAMIN) tablet, Take 1 tablet by mouth daily  , Disp: , Rfl:   •  omeprazole (PriLOSEC) 40 MG capsule, Take 1 capsule (40 mg total) by mouth daily for 30 days (Patient taking differently: Take by mouth 1 daily prn), Disp: 30 capsule, Rfl: 3  Allergies as of 06/07/2023 - Reviewed 06/07/2023   Allergen Reaction Noted   • Amoxicillin-pot clavulanate GI Intolerance 05/08/2014         The following portions of the patient's history were reviewed and updated as appropriate: allergies, current medications, past family history, past medical history, past social history, past surgical history, and problem list     Review of Systems   Constitutional: Negative  Musculoskeletal: Positive for back pain  Neurological: Negative  Psychiatric/Behavioral: Negative  Physical Exam:  Physical Exam  Constitutional:       Appearance: Normal appearance  Musculoskeletal:      Lumbar back: Spasms and tenderness present  Decreased range of motion  Negative right straight leg raise test and negative left straight leg raise test         Back:       Comments: Pelvic obliquity elevated left versus right innominate there is internal rotation of the right innominate on sacrum there is a right SI and L5-S1 segmental dysfunction noted  Considerable tightness noted of the left anterior hip flexors  Right piriformis and gluteus medius active myofascial trigger points present  Skin:     General: Skin is warm and dry  Neurological:      General: No focal deficit present  Mental Status: He is alert and oriented to person, place, and time  Motor: Motor function is intact  Gait: Gait is intact  Comments: Arises slowly out of the seated position  Psychiatric:         Mood and Affect: Mood normal          Behavior: Behavior normal          Thought Content: Thought content normal          Assessment:  Diagnoses and all orders for this visit:    Acute right-sided low back pain without sciatica         Treatment:  [id number]  Manipulation to the right innominate, sacrum, and L5 via Ward drop maneuver this is well-tolerated by the patient    We then performed myofascial release to the thoracolumbar fascia as well as stretching the bilateral hips and lower extremities  Ultrasound 1 2 W/cm² in continuous mode bilateral erector spinae quadratus lumborum musculature well-tolerated by the patient  This is an 8-minute procedure  Discussion:  Reviewed with Meir's home care instructions he has a good handle on these stressed the importance of stretching of the hip flexors he was very tight on the left he is going to work on that we will see him back as needed in the ensuing week or so  No follow-ups on file

## 2023-08-04 ENCOUNTER — ANESTHESIA (OUTPATIENT)
Dept: GASTROENTEROLOGY | Facility: HOSPITAL | Age: 48
End: 2023-08-04

## 2023-08-04 ENCOUNTER — HOSPITAL ENCOUNTER (OUTPATIENT)
Dept: GASTROENTEROLOGY | Facility: HOSPITAL | Age: 48
Setting detail: OUTPATIENT SURGERY
End: 2023-08-04
Attending: COLON & RECTAL SURGERY
Payer: COMMERCIAL

## 2023-08-04 ENCOUNTER — ANESTHESIA EVENT (OUTPATIENT)
Dept: GASTROENTEROLOGY | Facility: HOSPITAL | Age: 48
End: 2023-08-04

## 2023-08-04 VITALS
HEART RATE: 78 BPM | TEMPERATURE: 97.1 F | RESPIRATION RATE: 16 BRPM | OXYGEN SATURATION: 99 % | SYSTOLIC BLOOD PRESSURE: 115 MMHG | DIASTOLIC BLOOD PRESSURE: 64 MMHG

## 2023-08-04 DIAGNOSIS — K62.5 RECTAL BLEEDING: ICD-10-CM

## 2023-08-04 PROCEDURE — G0121 COLON CA SCRN NOT HI RSK IND: HCPCS | Performed by: COLON & RECTAL SURGERY

## 2023-08-04 RX ORDER — PHENYLEPHRINE HCL IN 0.9% NACL 1 MG/10 ML
SYRINGE (ML) INTRAVENOUS AS NEEDED
Status: DISCONTINUED | OUTPATIENT
Start: 2023-08-04 | End: 2023-08-04

## 2023-08-04 RX ORDER — LIDOCAINE HYDROCHLORIDE 10 MG/ML
INJECTION, SOLUTION EPIDURAL; INFILTRATION; INTRACAUDAL; PERINEURAL AS NEEDED
Status: DISCONTINUED | OUTPATIENT
Start: 2023-08-04 | End: 2023-08-04

## 2023-08-04 RX ORDER — PROPOFOL 10 MG/ML
INJECTION, EMULSION INTRAVENOUS AS NEEDED
Status: DISCONTINUED | OUTPATIENT
Start: 2023-08-04 | End: 2023-08-04

## 2023-08-04 RX ORDER — SODIUM CHLORIDE 9 MG/ML
INJECTION, SOLUTION INTRAVENOUS CONTINUOUS PRN
Status: DISCONTINUED | OUTPATIENT
Start: 2023-08-04 | End: 2023-08-04

## 2023-08-04 RX ORDER — PROPOFOL 10 MG/ML
INJECTION, EMULSION INTRAVENOUS CONTINUOUS PRN
Status: DISCONTINUED | OUTPATIENT
Start: 2023-08-04 | End: 2023-08-04

## 2023-08-04 RX ADMIN — PROPOFOL 120 MG: 10 INJECTION, EMULSION INTRAVENOUS at 07:37

## 2023-08-04 RX ADMIN — LIDOCAINE HYDROCHLORIDE 50 MG: 10 INJECTION, SOLUTION EPIDURAL; INFILTRATION; INTRACAUDAL; PERINEURAL at 07:37

## 2023-08-04 RX ADMIN — PROPOFOL 120 MCG/KG/MIN: 10 INJECTION, EMULSION INTRAVENOUS at 07:37

## 2023-08-04 RX ADMIN — Medication 100 MCG: at 07:59

## 2023-08-04 RX ADMIN — SODIUM CHLORIDE: 0.9 INJECTION, SOLUTION INTRAVENOUS at 07:35

## 2023-08-04 RX ADMIN — Medication 100 MCG: at 07:47

## 2023-08-04 RX ADMIN — PROPOFOL 30 MG: 10 INJECTION, EMULSION INTRAVENOUS at 07:42

## 2023-08-04 RX ADMIN — PROPOFOL 50 MG: 10 INJECTION, EMULSION INTRAVENOUS at 07:55

## 2023-08-04 NOTE — ANESTHESIA POSTPROCEDURE EVALUATION
Post-Op Assessment Note    CV Status:  Stable    Pain management: adequate     Mental Status:  Sleepy and arousable   Hydration Status:  Euvolemic   PONV Controlled:  Controlled   Airway Patency:  Patent      Post Op Vitals Reviewed: Yes      Staff: CRNA         No notable events documented.     BP (!) 89/51 (08/04/23 0803)    Temp (!) 97.1 °F (36.2 °C) (08/04/23 0803)    Pulse 89 (08/04/23 0803)   Resp 16 (08/04/23 0803)    SpO2 99 % (08/04/23 0803)

## 2023-08-04 NOTE — H&P
History and Physical   Colon and Rectal Surgery   Louise Hernandez 57 y.o. male MRN: [id number]  Unit/Bed#:  Encounter: [id number]  08/04/23   7:20 AM      No chief complaint on file. ASSESSMENT:    73-year-old male, known to me for colonoscopy 2015.    History of grandfather with polyps, he is due for age 41 colonoscopy screening.     PLAN:  Colonoscopy    History of Present Illness   HPI:  Louise Hernandez is a 57 y.o. male who presents for colonoscopy. Historical Information   Past Medical History:   Diagnosis Date   • GERD (gastroesophageal reflux disease)    • PVC (premature ventricular contraction) resolved   • Tenosynovitis of wrist      Past Surgical History:   Procedure Laterality Date   • COLONOSCOPY     • ESOPHAGOGASTRODUODENOSCOPY N/A 10/20/2016    Procedure: ESOPHAGOGASTRODUODENOSCOPY (EGD); Surgeon: Zara Kawasaki, MD;  Location: [facility]; Service:    • FEMUR SURGERY      Femur repair   • VASECTOMY         Meds/Allergies     (Not in a hospital admission)        Current Outpatient Medications:   •  Calcium Carb-Cholecalciferol (CALCIUM 600 + D PO), Take by mouth 1 daily, Disp: , Rfl:   •  hydrocortisone 2.5 % cream, Apply 1 application topically 2 (two) times a day To affected area, Disp: , Rfl:   •  Multiple Vitamin (MULTIVITAMIN) tablet, Take 1 tablet by mouth daily. , Disp: , Rfl:   •  omeprazole (PriLOSEC) 40 MG capsule, Take 1 capsule (40 mg total) by mouth daily for 30 days (Patient taking differently: Take by mouth 1 daily prn), Disp: 30 capsule, Rfl: 3    Allergies   Allergen Reactions   • Amoxicillin-Pot Clavulanate GI Intolerance         Social History   Social History     Substance and Sexual Activity   Alcohol Use Yes    Comment: occasional wine     Social History     Substance and Sexual Activity   Drug Use No     Social History     Tobacco Use   Smoking Status Never   Smokeless Tobacco Never         Family History:   Family History   Problem Relation Age of Onset   • Leukemia Mother • Corrales's esophagus Father    • Coronary artery disease Father    • Diabetes Father    • Heart disease Father    • Substance Abuse Neg Hx    • Mental illness Neg Hx          Objective     Current Vitals:      No intake or output data in the 24 hours ending 08/04/23 0720    Physical Exam:  General:no distress  Eyes:perrla/eomi  ENT:moist mucus membranes  Neck:supple  Pulm:no increased work of breathing  CV:sinus  Abdomen:soft,nontender  Extremities:no edema

## 2023-08-04 NOTE — ANESTHESIA PREPROCEDURE EVALUATION
Procedure:  COLONOSCOPY    Relevant Problems   GI/HEPATIC   (+) Gastroesophageal reflux disease without esophagitis        Physical Exam    Airway    Mallampati score: I  TM Distance: >3 FB  Neck ROM: full     Dental   No notable dental hx     Cardiovascular      Pulmonary      Other Findings        Anesthesia Plan  ASA Score- 2     Anesthesia Type- IV sedation with anesthesia with ASA Monitors. Additional Monitors:   Airway Plan:           Plan Factors-Exercise tolerance (METS): >4 METS. Chart reviewed. Patient summary reviewed. Induction- intravenous. Postoperative Plan-     Informed Consent- Anesthetic plan and risks discussed with patient. I personally reviewed this patient with the CRNA. Discussed and agreed on the Anesthesia Plan with the CRNA. Rustam Conrad

## 2023-08-10 ENCOUNTER — TELEPHONE (OUTPATIENT)
Dept: GASTROENTEROLOGY | Facility: MEDICAL CENTER | Age: 48
End: 2023-08-10

## 2023-08-10 NOTE — TELEPHONE ENCOUNTER
----- Message from Sue King MD sent at 8/8/2023  4:44 PM EDT -----  Please have Dr. Hailey Schwartz schedule for an EGD and an office visit if he is able to before or after the procedure. This would be at [place] end. If any issues scheduling him please let me know. Thank you.    Manjinder Fitzgerald

## 2023-08-10 NOTE — TELEPHONE ENCOUNTER
Please refer to message below from Dr. Louisa Jewell. Left detailed message for patient to call office to schedule for EGD with Dr. Louisa Jewell @ [address] [facility] lab.

## 2023-09-08 ENCOUNTER — OFFICE VISIT (OUTPATIENT)
Dept: FAMILY MEDICINE CLINIC | Facility: HOSPITAL | Age: 48
End: 2023-09-08
Payer: COMMERCIAL

## 2023-09-08 VITALS
RESPIRATION RATE: 16 BRPM | HEART RATE: 74 BPM | HEIGHT: 67 IN | BODY MASS INDEX: 24.17 KG/M2 | DIASTOLIC BLOOD PRESSURE: 70 MMHG | WEIGHT: 154 LBS | OXYGEN SATURATION: 97 % | SYSTOLIC BLOOD PRESSURE: 110 MMHG

## 2023-09-08 DIAGNOSIS — H61.23 HEARING LOSS OF BOTH EARS DUE TO CERUMEN IMPACTION: Primary | ICD-10-CM

## 2023-09-08 PROCEDURE — 99213 OFFICE O/P EST LOW 20 MIN: CPT | Performed by: INTERNAL MEDICINE

## 2023-09-08 PROCEDURE — 69210 REMOVE IMPACTED EAR WAX UNI: CPT | Performed by: INTERNAL MEDICINE

## 2023-09-08 NOTE — PROGRESS NOTES
Assessment/Plan:    No problem-specific Assessment & Plan notes found for this encounter. Diagnoses and all orders for this visit:    Hearing loss of both ears due to cerumen impaction  -     Ear cerumen removal          He had bilateral cerumen impaction causing decreased hearing. I removed the cerumen with flushing and curette. Patient through the procedure well    Subjective:      Patient ID: Surekha Ryder is a 57 y.o. male. Patient presents with a chief complaint of decreased hearing in both ears. Patient presents for follow-up of chronic conditions as detailed in the assessment and plan. The following portions of the patient's history were reviewed and updated as appropriate: current medications, past family history, past medical history, past social history, past surgical history and problem list.    Review of Systems      Objective:    /70   Pulse 74   Resp 16   Ht 5' 7" (1.702 m)   Wt 69.9 kg (154 lb)   SpO2 97%   BMI 24.12 kg/m²      Physical Exam  Constitutional:       General: He is not in acute distress. HENT:      Head: Normocephalic. Right Ear: Tympanic membrane normal. There is impacted cerumen. Left Ear: Tympanic membrane normal. There is impacted cerumen. Neurological:      Mental Status: He is alert. Ear cerumen removal    Date/Time: 9/8/2023 2:00 PM    Performed by: Jonelle Neumann MD  Authorized by: Jonelle Neumann MD  Universal Protocol:  Consent: Verbal consent obtained. Risks and benefits: risks, benefits and alternatives were discussed  Consent given by: patient  Time out: Immediately prior to procedure a "time out" was called to verify the correct patient, procedure, equipment, support staff and site/side marked as required.     Patient location:  Clinic  Procedure details:     Local anesthetic:  None    Location:  L ear and R ear    Procedure type: irrigation with instrumentation      Instrumentation: curette      Approach: External  Post-procedure details:     Complication:  None    Hearing quality:  Improved    Patient tolerance of procedure:   Tolerated well, no immediate complications        Livia Priest MD

## 2023-11-08 ENCOUNTER — ANESTHESIA (OUTPATIENT)
Dept: ANESTHESIOLOGY | Facility: HOSPITAL | Age: 48
End: 2023-11-08

## 2023-11-08 ENCOUNTER — ANESTHESIA EVENT (OUTPATIENT)
Dept: ANESTHESIOLOGY | Facility: HOSPITAL | Age: 48
End: 2023-11-08

## 2023-11-14 ENCOUNTER — APPOINTMENT (OUTPATIENT)
Dept: LAB | Facility: CLINIC | Age: 48
End: 2023-11-14
Payer: COMMERCIAL

## 2023-11-14 DIAGNOSIS — R71.8 RBC MICROCYTOSIS: ICD-10-CM

## 2023-11-14 DIAGNOSIS — Z00.00 ANNUAL PHYSICAL EXAM: Primary | ICD-10-CM

## 2023-11-14 DIAGNOSIS — Z00.00 ANNUAL PHYSICAL EXAM: ICD-10-CM

## 2023-11-14 LAB
FERRITIN SERPL-MCNC: 76 NG/ML (ref 24–336)
HCYS SERPL-SCNC: 8 UMOL/L (ref 5–15)
IRON SATN MFR SERPL: 18 % (ref 15–50)
IRON SERPL-MCNC: 55 UG/DL (ref 50–212)
TESTOST SERPL-MSCNC: 252 NG/DL
TIBC SERPL-MCNC: 313 UG/DL (ref 250–450)
UIBC SERPL-MCNC: 258 UG/DL (ref 155–355)
URATE SERPL-MCNC: 5 MG/DL (ref 3.5–8.5)
VIT B12 SERPL-MCNC: 527 PG/ML (ref 180–914)

## 2023-11-14 PROCEDURE — 82607 VITAMIN B-12: CPT

## 2023-11-14 PROCEDURE — 84550 ASSAY OF BLOOD/URIC ACID: CPT

## 2023-11-14 PROCEDURE — 83695 ASSAY OF LIPOPROTEIN(A): CPT

## 2023-11-14 PROCEDURE — 82172 ASSAY OF APOLIPOPROTEIN: CPT

## 2023-11-14 PROCEDURE — 83090 ASSAY OF HOMOCYSTEINE: CPT

## 2023-11-14 PROCEDURE — 36415 COLL VENOUS BLD VENIPUNCTURE: CPT

## 2023-11-14 PROCEDURE — 82728 ASSAY OF FERRITIN: CPT

## 2023-11-14 PROCEDURE — 82306 VITAMIN D 25 HYDROXY: CPT

## 2023-11-14 PROCEDURE — 84403 ASSAY OF TOTAL TESTOSTERONE: CPT

## 2023-11-14 PROCEDURE — 83550 IRON BINDING TEST: CPT

## 2023-11-14 PROCEDURE — 83540 ASSAY OF IRON: CPT

## 2023-11-15 LAB
APO B SERPL-MCNC: 78 MG/DL
LPA SERPL-SCNC: <9 NMOL/L

## 2023-11-20 LAB
25(OH)D2 SERPL-MCNC: <1 NG/ML
25(OH)D3 SERPL-MCNC: 35 NG/ML
25(OH)D3+25(OH)D2 SERPL-MCNC: 35 NG/ML

## 2023-11-21 ENCOUNTER — TELEPHONE (OUTPATIENT)
Dept: GASTROENTEROLOGY | Facility: MEDICAL CENTER | Age: 48
End: 2023-11-21

## 2023-11-23 RX ORDER — SODIUM CHLORIDE 9 MG/ML
125 INJECTION, SOLUTION INTRAVENOUS CONTINUOUS
Status: CANCELLED | OUTPATIENT
Start: 2023-11-23

## 2023-11-27 ENCOUNTER — HOSPITAL ENCOUNTER (OUTPATIENT)
Dept: GASTROENTEROLOGY | Facility: MEDICAL CENTER | Age: 48
Setting detail: OUTPATIENT SURGERY
Discharge: HOME | End: 2023-11-27
Attending: INTERNAL MEDICINE
Payer: COMMERCIAL

## 2023-11-27 ENCOUNTER — ANESTHESIA EVENT (OUTPATIENT)
Dept: GASTROENTEROLOGY | Facility: MEDICAL CENTER | Age: 48
End: 2023-11-27

## 2023-11-27 ENCOUNTER — ANESTHESIA (OUTPATIENT)
Dept: GASTROENTEROLOGY | Facility: MEDICAL CENTER | Age: 48
End: 2023-11-27

## 2023-11-27 VITALS
HEART RATE: 80 BPM | OXYGEN SATURATION: 99 % | BODY MASS INDEX: 24.43 KG/M2 | RESPIRATION RATE: 20 BRPM | HEIGHT: 66 IN | TEMPERATURE: 97.7 F | SYSTOLIC BLOOD PRESSURE: 102 MMHG | DIASTOLIC BLOOD PRESSURE: 64 MMHG | WEIGHT: 152 LBS

## 2023-11-27 DIAGNOSIS — K21.9 GASTROESOPHAGEAL REFLUX DISEASE WITHOUT ESOPHAGITIS: ICD-10-CM

## 2023-11-27 PROCEDURE — 88305 TISSUE EXAM BY PATHOLOGIST: CPT | Performed by: PATHOLOGY

## 2023-11-27 PROCEDURE — 43239 EGD BIOPSY SINGLE/MULTIPLE: CPT | Performed by: INTERNAL MEDICINE

## 2023-11-27 RX ORDER — SODIUM CHLORIDE 9 MG/ML
125 INJECTION, SOLUTION INTRAVENOUS CONTINUOUS
Status: DISCONTINUED | OUTPATIENT
Start: 2023-11-27 | End: 2023-12-01 | Stop reason: HOSPADM

## 2023-11-27 RX ORDER — PROPOFOL 10 MG/ML
INJECTION, EMULSION INTRAVENOUS AS NEEDED
Status: DISCONTINUED | OUTPATIENT
Start: 2023-11-27 | End: 2023-11-27

## 2023-11-27 RX ORDER — LIDOCAINE HCL/PF 100 MG/5ML
SYRINGE (ML) INJECTION AS NEEDED
Status: DISCONTINUED | OUTPATIENT
Start: 2023-11-27 | End: 2023-11-27

## 2023-11-27 RX ADMIN — PROPOFOL 20 MG: 10 INJECTION, EMULSION INTRAVENOUS at 07:33

## 2023-11-27 RX ADMIN — LIDOCAINE HYDROCHLORIDE 100 MG: 20 INJECTION INTRAVENOUS at 07:32

## 2023-11-27 RX ADMIN — PROPOFOL 20 MG: 10 INJECTION, EMULSION INTRAVENOUS at 07:39

## 2023-11-27 RX ADMIN — PROPOFOL 20 MG: 10 INJECTION, EMULSION INTRAVENOUS at 07:35

## 2023-11-27 RX ADMIN — SODIUM CHLORIDE 125 ML/HR: 0.9 INJECTION, SOLUTION INTRAVENOUS at 07:09

## 2023-11-27 RX ADMIN — PROPOFOL 30 MG: 10 INJECTION, EMULSION INTRAVENOUS at 07:34

## 2023-11-27 RX ADMIN — PROPOFOL 130 MG: 10 INJECTION, EMULSION INTRAVENOUS at 07:32

## 2023-11-27 RX ADMIN — PROPOFOL 30 MG: 10 INJECTION, EMULSION INTRAVENOUS at 07:37

## 2023-11-27 NOTE — ANESTHESIA PREPROCEDURE EVALUATION
Procedure:  EGD    Relevant Problems   ANESTHESIA (within normal limits)      GI/HEPATIC   (+) Gastroesophageal reflux disease without esophagitis      Endocrine   (+) Thyroid nodule        Physical Exam    Airway    Mallampati score: II  TM Distance: <3 FB  Neck ROM: full     Dental   No notable dental hx     Cardiovascular  Rhythm: regular, Rate: normal, Cardiovascular exam normal    Pulmonary  Pulmonary exam normal Breath sounds clear to auscultation    Other Findings        Anesthesia Plan  ASA Score- 2     Anesthesia Type- IV sedation with anesthesia with ASA Monitors. Additional Monitors:     Airway Plan:            Plan Factors-Exercise tolerance (METS): >4 METS. Chart reviewed. Patient summary reviewed. Patient is not a current smoker. Induction- intravenous. Postoperative Plan-     Informed Consent- Anesthetic plan and risks discussed with patient.

## 2023-11-27 NOTE — ANESTHESIA POSTPROCEDURE EVALUATION
Post-Op Assessment Note    CV Status:  Stable    Pain management: adequate       Mental Status:  Alert and awake   Hydration Status:  Euvolemic   PONV Controlled:  Controlled   Airway Patency:  Patent  Two or more mitigation strategies used for obstructive sleep apnea   Post Op Vitals Reviewed: Yes    No anethesia notable event occurred.     Staff: Anesthesiologist               BP      Temp      Pulse     Resp      SpO2      /64   Pulse 80   Temp 97.7 °F (36.5 °C) (Temporal)   Resp 20   Ht 5' 6" (1.676 m)   Wt 68.9 kg (152 lb)   SpO2 99%   BMI 24.53 kg/m²

## 2023-11-27 NOTE — H&P
History and Physical -  [facility]  Janeen Williamson 57 y.o. male MRN: [id number]    HPI: Janeen Williamson is a 57y.o. year old male who presents with GERD symptoms. Review of Systems    Historical Information   Past Medical History:   Diagnosis Date    GERD (gastroesophageal reflux disease)     PVC (premature ventricular contraction) resolved    Tenosynovitis of wrist      Past Surgical History:   Procedure Laterality Date    COLONOSCOPY      ESOPHAGOGASTRODUODENOSCOPY N/A 10/20/2016    Procedure: ESOPHAGOGASTRODUODENOSCOPY (EGD); Surgeon: Ulisses Campbell MD;  Location: [facility]; Service:     FEMUR SURGERY      Femur repair    VASECTOMY       Social History   Social History     Substance and Sexual Activity   Alcohol Use Yes    Comment: occasional wine     Social History     Substance and Sexual Activity   Drug Use No     Social History     Tobacco Use   Smoking Status Never   Smokeless Tobacco Never     Family History   Problem Relation Age of Onset    Leukemia Mother     Corrales's esophagus Father     Coronary artery disease Father     Diabetes Father     Heart disease Father     Substance Abuse Neg Hx     Mental illness Neg Hx        Meds/Allergies     (Not in a hospital admission)      Allergies   Allergen Reactions    Amoxicillin-Pot Clavulanate GI Intolerance       Objective     /71   Pulse 87   Temp 97.7 °F (36.5 °C) (Temporal)   Resp 20   Ht 5' 6" (1.676 m)   Wt 68.9 kg (152 lb)   SpO2 99%   BMI 24.53 kg/m²       PHYSICAL EXAM    Gen: NAD  CV: RRR  CHEST: Clear  ABD: soft, NT/ND  EXT: no edema  Neuro: AAO      ASSESSMENT/PLAN:  This is a 57y.o. year old male here for EGD for worsening GERD symptoms.      PLAN:   Procedure: EGD

## 2023-11-29 PROCEDURE — 88305 TISSUE EXAM BY PATHOLOGIST: CPT | Performed by: PATHOLOGY

## 2023-12-01 ENCOUNTER — CONSULT (OUTPATIENT)
Dept: SURGERY | Facility: CLINIC | Age: 48
End: 2023-12-01
Payer: COMMERCIAL

## 2023-12-01 VITALS
HEIGHT: 66 IN | OXYGEN SATURATION: 98 % | SYSTOLIC BLOOD PRESSURE: 126 MMHG | DIASTOLIC BLOOD PRESSURE: 78 MMHG | HEART RATE: 88 BPM | WEIGHT: 155.6 LBS | RESPIRATION RATE: 12 BRPM | BODY MASS INDEX: 25.01 KG/M2 | TEMPERATURE: 98.1 F

## 2023-12-01 DIAGNOSIS — K21.9 GASTROESOPHAGEAL REFLUX DISEASE WITHOUT ESOPHAGITIS: Primary | ICD-10-CM

## 2023-12-01 PROCEDURE — 99203 OFFICE O/P NEW LOW 30 MIN: CPT | Performed by: SURGERY

## 2023-12-01 NOTE — ASSESSMENT & PLAN NOTE
44-year-old male with GERD in the setting of a hiatal hernia. Plan:  We do long conversation regarding the pathophysiology of reflux disease in the setting of a hiatal hernias. Given his upper endoscopy findings of a type I hiatal hernia, as well as his persistent symptoms including both typical and atypical symptoms of reflux, I do think continuing his benign foregut workup is appropriate at this time. Would like to obtain an upper GI series as well as esophageal manometry given his dysphagia symptoms. We will hold off on 24-hour pH testing at this time, but we did discuss ordering this if his manometric findings are at all abnormal.    To return to clinic after his upper GI and manometry discuss results and next best steps.

## 2023-12-01 NOTE — PROGRESS NOTES
Office Visit - General Surgery  Viktoriya Daigle MRN: [id number]  Encounter: [id number]    Assessment and Plan  Problem List Items Addressed This Visit          Digestive    Gastroesophageal reflux disease without esophagitis - Primary     42-year-old male with GERD in the setting of a hiatal hernia. Plan:  We do long conversation regarding the pathophysiology of reflux disease in the setting of a hiatal hernias. Given his upper endoscopy findings of a type I hiatal hernia, as well as his persistent symptoms including both typical and atypical symptoms of reflux, I do think continuing his benign foregut workup is appropriate at this time. Would like to obtain an upper GI series as well as esophageal manometry given his dysphagia symptoms. We will hold off on 24-hour pH testing at this time, but we did discuss ordering this if his manometric findings are at all abnormal.    To return to clinic after his upper GI and manometry discuss results and next best steps. Relevant Orders    Esophageal manometry    [place] [facility]       Chief Complaint:  Viktoriya Daigle is a 57 y.o. male who presents for Consult (Hiatal hernia.)    Subjective  42-year-old male presents with GERD and a hiatal hernia. He has a 7 to 10-year history of reflux disease currently taking omeprazole once daily. His symptoms mainly consist of heartburn, regurgitation, with some dysphagia to solids and occasionally liquids. Additionally he has a 3-year history of a persistent dry cough, worse at night. His symptoms across the board are worse at night, and when eating late. He recently underwent upper endoscopy with Dr. Veto Vidal of gastroenterology which showed a small type I hiatal hernia, Hill grade 3. Denies any history of abdominal surgery.     Past Medical History:   Diagnosis Date    GERD (gastroesophageal reflux disease)     PVC (premature ventricular contraction) resolved    Tenosynovitis of wrist        Past Surgical History: Procedure Laterality Date    COLONOSCOPY      ESOPHAGOGASTRODUODENOSCOPY N/A 10/20/2016    Procedure: ESOPHAGOGASTRODUODENOSCOPY (EGD); Surgeon: Lokesh Guardado MD;  Location: [facility]; Service:     FEMUR SURGERY      Femur repair    VASECTOMY         Family History   Problem Relation Age of Onset    Leukemia Mother     Corrales's esophagus Father     Coronary artery disease Father     Diabetes Father     Heart disease Father     Substance Abuse Neg Hx     Mental illness Neg Hx        Social History     Tobacco Use    Smoking status: Never    Smokeless tobacco: Never   Vaping Use    Vaping Use: Never used   Substance Use Topics    Alcohol use: Yes     Comment: occasional wine    Drug use: No        Medications  Current Outpatient Medications on File Prior to Visit   Medication Sig Dispense Refill    Calcium Carb-Cholecalciferol (CALCIUM 600 + D PO) Take by mouth 1 daily      hydrocortisone 2.5 % cream Apply 1 application topically 2 (two) times a day To affected area      Multiple Vitamin (MULTIVITAMIN) tablet Take 1 tablet by mouth daily. omeprazole (PriLOSEC) 40 MG capsule Take 1 capsule (40 mg total) by mouth daily for 30 days (Patient taking differently: Take by mouth 1 daily prn) 30 capsule 3     Current Facility-Administered Medications on File Prior to Visit   Medication Dose Route Frequency Provider Last Rate Last Admin    [DISCONTINUED] sodium chloride 0.9 % infusion  125 mL/hr Intravenous Continuous Refugio Augustine  mL/hr at 11/27/23 0272 Continue from Pre-op at 11/27/23 0728       Allergies  Allergies   Allergen Reactions    Amoxicillin-Pot Clavulanate GI Intolerance       Review of Systems   Constitutional:  Negative for appetite change, chills, diaphoresis and fever. HENT:  Negative for nosebleeds and trouble swallowing. Eyes: Negative. Respiratory:  Positive for cough. Negative for shortness of breath and wheezing.     Cardiovascular:  Negative for chest pain, palpitations and leg swelling. Gastrointestinal:  Positive for vomiting. Negative for abdominal distention, abdominal pain and nausea. Genitourinary:  Negative for difficulty urinating, flank pain and frequency. Musculoskeletal:  Negative for arthralgias, joint swelling and myalgias. Skin:  Negative for pallor and rash. Neurological:  Negative for dizziness, facial asymmetry and speech difficulty. Hematological:  Does not bruise/bleed easily. Psychiatric/Behavioral:  Negative for agitation and confusion. All other systems reviewed and are negative. Objective  Vitals:    12/01/23 1225   BP: 126/78   Pulse: 88   Resp: 12   Temp: 98.1 °F (36.7 °C)   SpO2: 98%       Physical Exam  Vitals and nursing note reviewed. Constitutional:       General: He is not in acute distress. Appearance: Normal appearance. He is not toxic-appearing. HENT:      Head: Normocephalic and atraumatic. Mouth/Throat:      Mouth: Mucous membranes are moist.   Eyes:      Extraocular Movements: Extraocular movements intact. Pupils: Pupils are equal, round, and reactive to light. Cardiovascular:      Rate and Rhythm: Normal rate and regular rhythm. Pulses: Normal pulses. Pulmonary:      Effort: Pulmonary effort is normal. No respiratory distress. Breath sounds: Normal breath sounds. No wheezing. Abdominal:      General: There is no distension. Palpations: Abdomen is soft. There is no mass. Tenderness: There is no abdominal tenderness. There is no guarding or rebound. Hernia: No hernia is present. Musculoskeletal:         General: No swelling or deformity. Normal range of motion. Cervical back: Normal range of motion and neck supple. Right lower leg: No edema. Left lower leg: No edema. Skin:     General: Skin is warm and dry. Coloration: Skin is not jaundiced. Neurological:      General: No focal deficit present.       Mental Status: He is alert and oriented to person, place, and time.    Psychiatric:         Mood and Affect: Mood normal.         Behavior: Behavior normal.

## 2023-12-04 ENCOUNTER — APPOINTMENT (RX ONLY)
Dept: URBAN - METROPOLITAN AREA CLINIC 26 | Facility: CLINIC | Age: 48
Setting detail: DERMATOLOGY
End: 2023-12-04

## 2023-12-04 DIAGNOSIS — L81.4 OTHER MELANIN HYPERPIGMENTATION: ICD-10-CM

## 2023-12-04 DIAGNOSIS — D18.0 HEMANGIOMA: ICD-10-CM

## 2023-12-04 DIAGNOSIS — L82.1 OTHER SEBORRHEIC KERATOSIS: ICD-10-CM

## 2023-12-04 DIAGNOSIS — D22 MELANOCYTIC NEVI: ICD-10-CM

## 2023-12-04 DIAGNOSIS — L30.0 NUMMULAR DERMATITIS: ICD-10-CM

## 2023-12-04 PROBLEM — D22.4 MELANOCYTIC NEVI OF SCALP AND NECK: Status: ACTIVE | Noted: 2023-12-04

## 2023-12-04 PROBLEM — D18.01 HEMANGIOMA OF SKIN AND SUBCUTANEOUS TISSUE: Status: ACTIVE | Noted: 2023-12-04

## 2023-12-04 PROBLEM — D22.5 MELANOCYTIC NEVI OF TRUNK: Status: ACTIVE | Noted: 2023-12-04

## 2023-12-04 PROCEDURE — ? PRESCRIPTION MEDICATION MANAGEMENT

## 2023-12-04 PROCEDURE — ? SUNSCREEN RECOMMENDATIONS

## 2023-12-04 PROCEDURE — 99203 OFFICE O/P NEW LOW 30 MIN: CPT

## 2023-12-04 PROCEDURE — ? COUNSELING

## 2023-12-04 PROCEDURE — ? PRESCRIPTION

## 2023-12-04 RX ORDER — TRIAMCINOLONE ACETONIDE 1 MG/G
1 CREAM TOPICAL BID
Qty: 80 | Refills: 3 | Status: ERX | COMMUNITY
Start: 2023-12-04

## 2023-12-04 RX ADMIN — TRIAMCINOLONE ACETONIDE 1: 1 CREAM TOPICAL at 00:00

## 2023-12-04 ASSESSMENT — LOCATION ZONE DERM
LOCATION ZONE: LEG
LOCATION ZONE: ARM
LOCATION ZONE: NECK
LOCATION ZONE: TRUNK

## 2023-12-04 ASSESSMENT — LOCATION DETAILED DESCRIPTION DERM
LOCATION DETAILED: LEFT LATERAL INFERIOR CHEST
LOCATION DETAILED: SUPERIOR THORACIC SPINE
LOCATION DETAILED: RIGHT PROXIMAL PRETIBIAL REGION
LOCATION DETAILED: RIGHT LATERAL INFERIOR CHEST
LOCATION DETAILED: RIGHT CLAVICULAR NECK
LOCATION DETAILED: LEFT ELBOW

## 2023-12-04 ASSESSMENT — LOCATION SIMPLE DESCRIPTION DERM
LOCATION SIMPLE: RIGHT PRETIBIAL REGION
LOCATION SIMPLE: RIGHT ANTERIOR NECK
LOCATION SIMPLE: LEFT ELBOW
LOCATION SIMPLE: UPPER BACK
LOCATION SIMPLE: CHEST

## 2023-12-04 NOTE — PROCEDURE: PRESCRIPTION MEDICATION MANAGEMENT
Render In Strict Bullet Format?: No
Detail Level: Zone
Initiate Treatment: triamcinolone acetonide 0.1 % topical cream: Apply twice daily to eczema up to 2 weeks/month as needed.\\nGSC regimen

## 2023-12-04 NOTE — HPI: SKIN LESION
What Type Of Note Output Would You Prefer (Optional)?: Standard Output
Is This A New Presentation, Or A Follow-Up?: Skin Lesion
Which Family Member (Optional)?: Grandfather

## 2023-12-06 ENCOUNTER — HOSPITAL ENCOUNTER (EMERGENCY)
Facility: HOSPITAL | Age: 48
Discharge: HOME | End: 2023-12-06
Attending: EMERGENCY MEDICINE
Payer: OTHER MISCELLANEOUS

## 2023-12-06 VITALS
HEART RATE: 82 BPM | DIASTOLIC BLOOD PRESSURE: 80 MMHG | TEMPERATURE: 98.2 F | RESPIRATION RATE: 18 BRPM | SYSTOLIC BLOOD PRESSURE: 132 MMHG | OXYGEN SATURATION: 98 %

## 2023-12-06 DIAGNOSIS — Z77.21 EXPOSURE TO BLOOD OR BODY FLUID: ICD-10-CM
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
## 2023-12-06 DIAGNOSIS — Z20.5 EXPOSURE TO HEPATITIS C: Primary | ICD-10-CM

## 2023-12-06 LAB
ALT SERPL W P-5'-P-CCNC: 18 U/L (ref 7–52)
HBV SURFACE AB SER-ACNC: 404 MIU/ML
HBV SURFACE AG SER QL: NORMAL
HCV AB SER QL: NORMAL
HIV 1+2 AB+HIV1 P24 AG SERPL QL IA: NORMAL
HIV 2 AB SERPL QL IA: NORMAL
HIV1 AB SERPL QL IA: NORMAL
HIV1 P24 AG SERPL QL IA: NORMAL

## 2023-12-06 PROCEDURE — 86803 HEPATITIS C AB TEST: CPT | Performed by: EMERGENCY MEDICINE

## 2023-12-06 PROCEDURE — 87389 HIV-1 AG W/HIV-1&-2 AB AG IA: CPT | Performed by: EMERGENCY MEDICINE

## 2023-12-06 PROCEDURE — 99283 EMERGENCY DEPT VISIT LOW MDM: CPT

## 2023-12-06 PROCEDURE — 87340 HEPATITIS B SURFACE AG IA: CPT | Performed by: EMERGENCY MEDICINE

## 2023-12-06 PROCEDURE — 99284 EMERGENCY DEPT VISIT MOD MDM: CPT | Performed by: EMERGENCY MEDICINE

## 2023-12-06 PROCEDURE — 84460 ALANINE AMINO (ALT) (SGPT): CPT | Performed by: EMERGENCY MEDICINE

## 2023-12-06 PROCEDURE — 36415 COLL VENOUS BLD VENIPUNCTURE: CPT | Performed by: EMERGENCY MEDICINE

## 2023-12-06 PROCEDURE — 86706 HEP B SURFACE ANTIBODY: CPT | Performed by: EMERGENCY MEDICINE

## 2023-12-06 NOTE — ED PROVIDER NOTES
Emergency Department Employee Health Note  Kavita Hammond 57 y.o. male MRN: [id number]  Unit/Bed#: ED 16/ED 16 Encounter: [id number]        History of Present Illness     Chief Complaint:   Chief Complaint   Patient presents with    Infectious Exposure - Needlestick     Pt was in [address] yesterday and stuck with orthopedic wire on R thumb approx 1415 (12/05). Denies pain/symptoms     HPI:  Kavita Hammond is a 57 y.o. male who presents with body fluid exposure . Mechanism:Was operating on patient yesterday and sustained a puncture with a piece of orthopedic hawrdware (fixation wire)  to right thumb  Source patient was tested at time of injury. History provided by:  Patient   used: No      Review of Systems    Historical Information     Immunizations:   Immunization History   Administered Date(s) Administered    COVID-19 PFIZER VACCINE 0.3 ML IM 12/18/2020, 01/06/2021, 10/05/2021, 10/21/2022    INFLUENZA 10/27/2021    Influenza, seasonal, injectable 11/23/2022    Influenza, seasonal, injectable, preservative free 10/13/2020    Tdap 04/28/2016       Past Medical History:   Diagnosis Date    GERD (gastroesophageal reflux disease)     PVC (premature ventricular contraction) resolved    Tenosynovitis of wrist        Family History   Problem Relation Age of Onset    Leukemia Mother     Corrales's esophagus Father     Coronary artery disease Father     Diabetes Father     Heart disease Father     Substance Abuse Neg Hx     Mental illness Neg Hx      Past Surgical History:   Procedure Laterality Date    COLONOSCOPY      ESOPHAGOGASTRODUODENOSCOPY N/A 10/20/2016    Procedure: ESOPHAGOGASTRODUODENOSCOPY (EGD); Surgeon: Judie Salas MD;  Location: [facility]; Service:     FEMUR SURGERY      Femur repair    VASECTOMY       Social History     Tobacco Use    Smoking status: Never    Smokeless tobacco: Never   Vaping Use    Vaping Use: Never used   Substance Use Topics    Alcohol use:  Yes Comment: occasional wine    Drug use: No     E-Cigarette/Vaping    E-Cigarette Use Never User      E-Cigarette/Vaping Substances         Meds/Allergies   Prior to Admission Medications   Prescriptions Last Dose Informant Patient Reported? Taking? Calcium Carb-Cholecalciferol (CALCIUM 600 + D PO)  Self Yes No   Sig: Take by mouth 1 daily   Multiple Vitamin (MULTIVITAMIN) tablet   Yes No   Sig: Take 1 tablet by mouth daily. hydrocortisone 2.5 % cream   Yes No   Sig: Apply 1 application topically 2 (two) times a day To affected area   omeprazole (PriLOSEC) 40 MG capsule  Self No No   Sig: Take 1 capsule (40 mg total) by mouth daily for 30 days   Patient taking differently: Take by mouth 1 daily prn      Facility-Administered Medications: None       Allergies   Allergen Reactions    Amoxicillin-Pot Clavulanate GI Intolerance       PHYSICAL EXAM  Physical Exam  Skin:     General: Skin is warm and dry. Findings: Lesion present. Vital Signs  ED Triage Vitals   Temperature Pulse Respirations Blood Pressure SpO2   12/06/23 1600 12/06/23 1556 12/06/23 1556 12/06/23 1600 12/06/23 1556   98.2 °F (36.8 °C) 82 18 132/80 98 %      Temp Source Heart Rate Source Patient Position - Orthostatic VS BP Location FiO2 (%)   12/06/23 1600 -- -- -- --   Oral          Pain Score       --                  Vitals:    12/06/23 1556 12/06/23 1600   BP:  132/80   Pulse: 82          Certification of Exposure:    (link to [address] [place], [place] [address]vd: [address] [place] [address] ([email or web address]): find link to BBP Exposure Instructions under important links on center of the page)    The patient is stable and has a history and physical exam consistent with an Blood Exposure and based on my assessment this exposure is Significant     If “NonSignificant” nothing further needs to be filled out.   If "Significant" please continue with the following questions    For Significant Exposures All of the following items must be completed: Source Patient Name: See below    Source Patient MRN: not available   Was the Source Patient's Provider contacted Yes, Providers name: Dr Baron Boeck had source patient tested    Was "Exposure Panel - Source" ordered (including Rapid HIV, HBsAg and anti-HCV) for Source Patient: Yes    Exposure Information    Type of Body Fluid Exposure: blood    Estimated volume of fluid: small up to 5cc     Exposure area:  intact skin    Skin Integrity: punctured    ED provider should review source patient chart for known history of HIV, Hepatitis B and Hepatitis C infection    Source patient HIV positive: No  Was the On-call Infectious Disease Provider contacted: No  Discussed treatment options with/for employee: Yes    Immunization History   Administered Date(s) Administered    COVID-19 PFIZER VACCINE 0.3 ML IM 12/18/2020, 01/06/2021, 10/05/2021, 10/21/2022    INFLUENZA 10/27/2021    Influenza, seasonal, injectable 11/23/2022    Influenza, seasonal, injectable, preservative free 10/13/2020    Tdap 04/28/2016       Hepatitis B Vaccine History:    Immunization History   Administered Date(s) Administered    COVID-19 PFIZER VACCINE 0.3 ML IM 12/18/2020, 01/06/2021, 10/05/2021, 10/21/2022    INFLUENZA 10/27/2021    Influenza, seasonal, injectable 11/23/2022    Influenza, seasonal, injectable, preservative free 10/13/2020    Tdap 04/28/2016       The patient has Previously been vaccinated for Hepatitis B. HEPATITIS B IMMUNE GLOBULIN:  Not Indicated    Tetanus Vaccine History:    TDAP vaccination date: N/A     The patient has Tdap reported as up to date    Employee/Patient post exposure testing Has been performed. "Exposure Panel - Employee Baseline"  (includes HBsAg, HBsAb, anti-HCV, ALT, HIV) with verbal consent and pretesting counseling for the patient Has been ordered.     Post-exposure Prophylaxis treatment options were discussed today: Not Indicated      Visual Acuity      ED Medications  Medications - No data to display    Diagnostic Studies  Results Reviewed       Procedure Component Value Units Date/Time    HIV 1/2 AG/AB w Reflex [facility] for 2 yr old and above [[id number]]  (Normal) Collected: 12/06/23 1604    Lab Status: Final result Specimen: Blood from Arm, Left Updated: 12/06/23 2218     HIV-1 p24 Antigen Non-Reactive     HIV-1 Antibody Non-Reactive     HIV-2 Antibody Non-Reactive     HIV Ag-Ab 5th Gen Non-Reactive    Narrative: This 5th generation HIV Ag-Ab assay is a multiplex flow immunoassay intended for qualitative detection and differentiation of HIV-1 p24 antigen, HIV-1 (groups M and O) antibodies, and HIV-2 antibodies in human serum. This assay is intended as an aid in the diagnosis of infection with HIV-1 and/or HIV-2, including acute (primary) HIV-1 infection. The test is validated for adult patients, including pregnant women, and in pediatric patients as young as two years of age. The performance of this assay has not been established for neonates.       Hepatitis B surface antigen [[id number]]  (Normal) Collected: 12/06/23 1604    Lab Status: Final result Specimen: Blood from Arm, Left Updated: 12/06/23 2136     Hepatitis B Surface Ag Non-reactive    Hepatitis C antibody [[id number]]  (Normal) Collected: 12/06/23 1604    Lab Status: Final result Specimen: Blood from Arm, Left Updated: 12/06/23 2136     Hepatitis C Ab Non-reactive    Hepatitis B surface antibody [[id number]] Collected: 12/06/23 1604    Lab Status: Final result Specimen: Blood from Arm, Left Updated: 12/06/23 2136     Hep B S Ab 404.00 mIU/mL     ALT [[id number]]  (Normal) Collected: 12/06/23 1604    Lab Status: Final result Specimen: Blood from Arm, Left Updated: 12/06/23 1711     ALT 18 U/L                No orders to display              Procedures  Procedures         Medical Decision Making  S/p Puncture wound  SBFE exposure       Will check baseline labs - refer to occupational health as outpatient     Problems Addressed:  Exposure to blood or body fluid: self-limited or minor problem  Needle stick, hypodermic, accidental, initial encounter: self-limited or minor problem    Amount and/or Complexity of Data Reviewed  Labs: ordered. Details: Ordered and sent for [facility] health        Disposition  Final diagnoses:   Needle stick, hypodermic, accidental, initial encounter   Exposure to blood or body fluid     Time reflects when diagnosis was documented in both MDM as applicable and the Disposition within this note       Time User Action Codes Description Comment    12/6/2023  3:53 PM Senthil Maguire Add NevBrittany. 0XXA] Needle stick, hypodermic, accidental, initial encounter     12/6/2023  3:54 PM Senthil Maguire Add [Z77.21] Exposure to blood or body fluid           ED Disposition       ED Disposition   Discharge    Condition   Stable    Date/Time   Wed Dec 6, 2023  3:53 PM    Comment   Debra Villafuerte discharge to home/self care. Follow-up Information       Follow up With Specialties Details Why [address], [place]  In 1 day  [address]   [phone]            Discharge Medication List as of 12/6/2023  3:55 PM        CONTINUE these medications which have NOT CHANGED    Details   Calcium Carb-Cholecalciferol (CALCIUM 600 + D PO) Take by mouth 1 daily, Historical Med      hydrocortisone 2.5 % cream Apply 1 application topically 2 (two) times a day To affected area, Starting Wed 12/28/2022, Historical Med      Multiple Vitamin (MULTIVITAMIN) tablet Take 1 tablet by mouth daily. , Until Discontinued, Historical Med      omeprazole (PriLOSEC) 40 MG capsule Take 1 capsule (40 mg total) by mouth daily for 30 days, Starting Mon 9/9/2019, Until Mon 11/27/2023, Normal             No discharge procedures on file.     PDMP Review       None              ED Provider  Electronically Signed by               Elpidio Julio MD  12/07/23 5851

## 2023-12-08 ENCOUNTER — HOSPITAL ENCOUNTER (OUTPATIENT)
Dept: RADIOLOGY | Facility: HOSPITAL | Age: 48
Discharge: HOME | End: 2023-12-08
Attending: SURGERY
Payer: COMMERCIAL

## 2023-12-08 DIAGNOSIS — K21.9 GASTROESOPHAGEAL REFLUX DISEASE WITHOUT ESOPHAGITIS: ICD-10-CM

## 2023-12-08 PROCEDURE — 74240 X-RAY XM UPR GI TRC 1CNTRST: CPT

## 2024-01-02 ENCOUNTER — TELEPHONE (OUTPATIENT)
Dept: GASTROENTEROLOGY | Facility: HOSPITAL | Age: 49
End: 2024-01-02

## 2024-01-05 ENCOUNTER — HOSPITAL ENCOUNTER (OUTPATIENT)
Dept: GASTROENTEROLOGY | Facility: HOSPITAL | Age: 49
End: 2024-01-05
Attending: SURGERY
Payer: COMMERCIAL

## 2024-01-05 VITALS
RESPIRATION RATE: 16 BRPM | DIASTOLIC BLOOD PRESSURE: 67 MMHG | TEMPERATURE: 97.7 F | OXYGEN SATURATION: 100 % | SYSTOLIC BLOOD PRESSURE: 127 MMHG | HEART RATE: 86 BPM

## 2024-01-05 DIAGNOSIS — K21.9 GASTROESOPHAGEAL REFLUX DISEASE WITHOUT ESOPHAGITIS: ICD-10-CM

## 2024-01-05 PROCEDURE — 91010 ESOPHAGUS MOTILITY STUDY: CPT

## 2024-01-05 NOTE — PERIOPERATIVE NURSING NOTE
Patient brought in the room and explained the esophageal manometry procedure. After the confirmation of allergies, lidocaine 2 % viscous given via nostrils and  a transnasal insertion of the High Resolution esophageal manometry catheter was inserted via right/left nostril. Patient given water to drink during the insertion and once the catheter inserted pressure bands of both Upper esophageal sphincter  (UES) and Lower esophageal sphincter ( LES) were observed on the color contour. Patient instructed to take a deep breath to verify placement of the catheter, diaphragmatic pinch noted on inspiration. Catheter was secured to right/left cheek. Patient was assisted to supine position .Patient was instructed to relax  while acclimating the catheter for about 5 minutes. A 30 second baseline resting pressure was obtained to identify the UES and LES followed by a series of 10 liquid swallows using 5 cc room temperature normal saline to assess esophageal motility and bolus transit. Patient administered 10 viscous swallows using 5 cc viscous solution, 1 multiple rapid drink swallow using 2 cc room temperature normal saline given a total of 5 drinks. Patient instructed to sit up at the edge of the stretcher and given 5 upright liquid swallows using 5 cc room temperature normal saline and 1 rapid drink challenge using 190 cc room temperature water, patient did do some coughing right towards the end of contraction. At the end of the procedure the high resolution esophageal manometry catheter was removed from the nostril intact. Patient did have some brown light emesis.Patient given discharge instructions and patient left in stable condition.

## 2024-01-09 PROCEDURE — 91020 GASTRIC MOTILITY STUDIES: CPT | Performed by: INTERNAL MEDICINE

## 2024-02-02 ENCOUNTER — OFFICE VISIT (OUTPATIENT)
Dept: SURGERY | Facility: CLINIC | Age: 49
End: 2024-02-02
Payer: COMMERCIAL

## 2024-02-02 VITALS
OXYGEN SATURATION: 98 % | TEMPERATURE: 98 F | HEIGHT: 66 IN | WEIGHT: 156.9 LBS | BODY MASS INDEX: 25.22 KG/M2 | RESPIRATION RATE: 12 BRPM

## 2024-02-02 DIAGNOSIS — K21.9 GASTROESOPHAGEAL REFLUX DISEASE WITHOUT ESOPHAGITIS: ICD-10-CM

## 2024-02-02 PROCEDURE — 99213 OFFICE O/P EST LOW 20 MIN: CPT | Performed by: SURGERY

## 2024-02-02 RX ORDER — OMEPRAZOLE 40 MG/1
40 CAPSULE, DELAYED RELEASE ORAL 2 TIMES DAILY
Qty: 240 CAPSULE | Refills: 0 | Status: SHIPPED | OUTPATIENT
Start: 2024-02-02 | End: 2024-06-01

## 2024-02-02 NOTE — PROGRESS NOTES
Office Visit - General Surgery  Arpan Hayes MRN: [id number]  Encounter: [id number]  Assessment/Plan    Problem List Items Addressed This Visit       Gastroesophageal reflux disease without esophagitis     48-year-old male with GERD in the setting of a hiatal hernia    Plan:  We had a long discussion regarding his upper GI studies as well as his esophageal manometry.  Given his normal motility, and findings of a small hiatal hernia with moderate reflux, we discussed both the pros and cons of surgical intervention at this time.  He is having significant quality of life disruption, and is worried about progression of both his hernia and his reflux to Corrales's esophagus, given a family history of Corrales's.    We also discussed optimizing medical management, as he is struggled to take a nighttime dosing of a PPI, and I do think this would provide some symptomatic benefit.  We discussed doing a short trial of twice daily PPI, to see if he is able to stick with that schedule.  However, if he still continues to have disruption of his quality of life despite optimal medical management, he is interested in surgical repair.  He would be interested in this likely prior to the end of April, as he is going away in June, and would like an opportunity at a full recovery.  To check-in in 2 to 4 weeks, to discuss surgical intervention pending twice daily PPI dosing.         Relevant Medications    omeprazole (PriLOSEC) 40 MG capsule       Subjective    Arpan Hayes is a 48 y.o. male who presents for GERD and a hiatal hernia.  Since our last visit he has done a good job of sticking on his once daily PPI and initially had good improvement of his symptomatology, but is still having difficulty sleeping, and postprandial cough as well as occasional reflux.  He is avoiding specific trigger foods, however he is having a decreased quality of life given the elimination of these food and liquids.  He underwent upper GI series  which I reviewed in PACS from 12/8/2023 which showed a small hiatal hernia with moderate amount of reflux.  He also went esophageal manometry which I reviewed in PACS from 1/5/2024 which shows 8 out of 10 normal swallows with a mean DCI of 1037.  His IRP was normal with  80% bolus clearance and a small hiatal hernia.  This was consistent with normal esophageal motility.  He has been taking his morning PPI, however has been unable to take his nighttime dosing appropriately prior to meals given his schedule.      Pt  has a past medical history of GERD (gastroesophageal reflux disease), PVC (premature ventricular contraction) (resolved), and Tenosynovitis of wrist.    Pt  has a past surgical history that includes Esophagogastroduodenoscopy (N/A, 10/20/2016); Vasectomy; Colonoscopy; and Femur Surgery.    family history includes Corrales's esophagus in his father; Coronary artery disease in his father; Diabetes in his father; Heart disease in his father; Leukemia in his mother.    Arpan Hayes reports that he has never smoked. He has never used smokeless tobacco. He reports current alcohol use. He reports that he does not use drugs.      Current Outpatient Medications on File Prior to Visit   Medication Sig Dispense Refill    Calcium Carb-Cholecalciferol (CALCIUM 600 + D PO) Take by mouth 1 daily      Multiple Vitamin (MULTIVITAMIN) tablet Take 1 tablet by mouth daily.      hydrocortisone 2.5 % cream Apply 1 application topically 2 (two) times a day To affected area      [DISCONTINUED] omeprazole (PriLOSEC) 40 MG capsule Take 1 capsule (40 mg total) by mouth daily for 30 days (Patient taking differently: Take by mouth 1 daily prn) 30 capsule 3     No current facility-administered medications on file prior to visit.     Allergies   Allergen Reactions    Amoxicillin-Pot Clavulanate GI Intolerance       Review of Systems   Constitutional:  Negative for appetite change, chills, diaphoresis and fever.   HENT:  Negative  for nosebleeds and trouble swallowing.    Eyes: Negative.    Respiratory:  Positive for cough. Negative for shortness of breath and wheezing.    Cardiovascular:  Negative for chest pain, palpitations and leg swelling.   Gastrointestinal:  Positive for abdominal distention. Negative for abdominal pain, nausea and vomiting.   Genitourinary:  Negative for difficulty urinating, flank pain and frequency.   Musculoskeletal:  Negative for arthralgias, joint swelling and myalgias.   Skin:  Negative for pallor and rash.   Neurological:  Negative for dizziness, facial asymmetry and speech difficulty.   Hematological:  Does not bruise/bleed easily.   Psychiatric/Behavioral:  Negative for agitation and confusion.    All other systems reviewed and are negative.      Objective    Vitals:    02/02/24 0839   Resp: 12   Temp: 98 °F (36.7 °C)   SpO2: 98%       Physical Exam  Vitals and nursing note reviewed.   Constitutional:       General: He is not in acute distress.     Appearance: Normal appearance. He is not toxic-appearing.   HENT:      Head: Normocephalic and atraumatic.      Mouth/Throat:      Mouth: Mucous membranes are moist.   Eyes:      Extraocular Movements: Extraocular movements intact.      Pupils: Pupils are equal, round, and reactive to light.   Cardiovascular:      Rate and Rhythm: Normal rate and regular rhythm.      Pulses: Normal pulses.   Pulmonary:      Effort: Pulmonary effort is normal. No respiratory distress.      Breath sounds: Normal breath sounds. No wheezing.   Abdominal:      General: There is no distension.      Palpations: Abdomen is soft. There is no mass.      Tenderness: There is no abdominal tenderness. There is no guarding or rebound.      Hernia: No hernia is present.   Musculoskeletal:         General: No swelling or deformity. Normal range of motion.      Cervical back: Normal range of motion and neck supple.      Right lower leg: No edema.      Left lower leg: No edema.   Skin:     General:  Skin is warm and dry.      Coloration: Skin is not jaundiced.   Neurological:      General: No focal deficit present.      Mental Status: He is alert and oriented to person, place, and time.   Psychiatric:         Mood and Affect: Mood normal.         Behavior: Behavior normal.

## 2024-02-02 NOTE — ASSESSMENT & PLAN NOTE
48-year-old male with GERD in the setting of a hiatal hernia    Plan:  We had a long discussion regarding his upper GI studies as well as his esophageal manometry.  Given his normal motility, and findings of a small hiatal hernia with moderate reflux, we discussed both the pros and cons of surgical intervention at this time.  He is having significant quality of life disruption, and is worried about progression of both his hernia and his reflux to Corrales's esophagus, given a family history of Corrales's.    We also discussed optimizing medical management, as he is struggled to take a nighttime dosing of a PPI, and I do think this would provide some symptomatic benefit.  We discussed doing a short trial of twice daily PPI, to see if he is able to stick with that schedule.  However, if he still continues to have disruption of his quality of life despite optimal medical management, he is interested in surgical repair.  He would be interested in this likely prior to the end of April, as he is going away in June, and would like an opportunity at a full recovery.  To check-in in 2 to 4 weeks, to discuss surgical intervention pending twice daily PPI dosing.

## 2024-02-23 ENCOUNTER — OFFICE VISIT (OUTPATIENT)
Dept: SURGERY | Facility: CLINIC | Age: 49
End: 2024-02-23
Payer: COMMERCIAL

## 2024-02-23 VITALS — TEMPERATURE: 97.6 F | HEIGHT: 66 IN | WEIGHT: 153.6 LBS | BODY MASS INDEX: 24.68 KG/M2

## 2024-02-23 DIAGNOSIS — K21.9 GASTROESOPHAGEAL REFLUX DISEASE WITHOUT ESOPHAGITIS: Primary | ICD-10-CM

## 2024-02-23 PROCEDURE — 99213 OFFICE O/P EST LOW 20 MIN: CPT | Performed by: SURGERY

## 2024-02-23 NOTE — ASSESSMENT & PLAN NOTE
48-year-old male with GERD in the setting of a hiatal hernia.    Plan:  Plan for Robotic paraesophageal hernia repair with Toupet fundoplication, and EGD.  I discussed with the patient the possibility of open repair however unlikely, as well as the use of mesh to reinforce the hiatus.  We will plan to do this in the near future.     The risks and benefits of surgery were discussed and the patient was amenable to proceed, and consent was signed.  I discussed specific risks with her including the high risk of hernia recurrence as elucidated in the literature, as well as some initial dysphagia and trouble swallowing in the immediate postoperative.  The patient was amenable to this and acknowledged understanding of requiring a postoperative modified diet.

## 2024-02-23 NOTE — PROGRESS NOTES
Office Visit - General Surgery  Arpan Hayes MRN: [id number]  Encounter: [id number]  Assessment/Plan    Problem List Items Addressed This Visit       Gastroesophageal reflux disease without esophagitis - Primary     48-year-old male with GERD in the setting of a hiatal hernia.    Plan:  Plan for Robotic paraesophageal hernia repair with Toupet fundoplication, and EGD.  I discussed with the patient the possibility of open repair however unlikely, as well as the use of mesh to reinforce the hiatus.  We will plan to do this in the near future.     The risks and benefits of surgery were discussed and the patient was amenable to proceed, and consent was signed.  I discussed specific risks with her including the high risk of hernia recurrence as elucidated in the literature, as well as some initial dysphagia and trouble swallowing in the immediate postoperative.  The patient was amenable to this and acknowledged understanding of requiring a postoperative modified diet.              Subjective    Arpan Hayes is a 48 y.o. male who presents GERD and a hiatal hernia.  Since our last visit he has had worsening of his regurgitation like symptoms as well as heartburn.  He is made dietary modifications and gone to twice daily PPIs without improvement in his symptomatology.  As such, he is back to his PPI usage back down to once daily.  When he bends over or lays down he has a rising sensation in his throat, which is worsened over the past several weeks.  Denies any significant dysphagia or odynophagia.  Denies any fevers, chills, chest pain, shortness of breath.      Pt  has a past medical history of GERD (gastroesophageal reflux disease), PVC (premature ventricular contraction) (resolved), and Tenosynovitis of wrist.    Pt  has a past surgical history that includes Esophagogastroduodenoscopy (N/A, 10/20/2016); Vasectomy; Colonoscopy; and Femur Surgery.    family history includes Corrales's esophagus in his father;  Coronary artery disease in his father; Diabetes in his father; Heart disease in his father; Leukemia in his mother.    Arpan Hayes reports that he has never smoked. He has never used smokeless tobacco. He reports current alcohol use. He reports that he does not use drugs.      Current Outpatient Medications on File Prior to Visit   Medication Sig Dispense Refill    Calcium Carb-Cholecalciferol (CALCIUM 600 + D PO) Take by mouth 1 daily      Multiple Vitamin (MULTIVITAMIN) tablet Take 1 tablet by mouth daily.      omeprazole (PriLOSEC) 40 MG capsule Take 1 capsule (40 mg total) by mouth 2 (two) times a day 240 capsule 0    hydrocortisone 2.5 % cream Apply 1 application topically 2 (two) times a day To affected area       No current facility-administered medications on file prior to visit.     Allergies   Allergen Reactions    Amoxicillin-Pot Clavulanate GI Intolerance       Review of Systems   Constitutional:  Negative for appetite change, chills, diaphoresis and fever.   HENT:  Negative for nosebleeds and trouble swallowing.    Eyes: Negative.    Respiratory:  Negative for cough, shortness of breath and wheezing.    Cardiovascular:  Negative for chest pain, palpitations and leg swelling.   Gastrointestinal:  Negative for abdominal distention, abdominal pain, nausea and vomiting.   Genitourinary:  Negative for difficulty urinating, flank pain and frequency.   Musculoskeletal:  Negative for arthralgias, joint swelling and myalgias.   Skin:  Negative for pallor and rash.   Neurological:  Negative for dizziness, facial asymmetry and speech difficulty.   Hematological:  Does not bruise/bleed easily.   Psychiatric/Behavioral:  Negative for agitation and confusion.    All other systems reviewed and are negative.      Objective    Vitals:    02/23/24 1407   Temp: 97.6 °F (36.4 °C)       Physical Exam  Vitals and nursing note reviewed.   Constitutional:       General: He is not in acute distress.     Appearance: Normal  appearance. He is not toxic-appearing.   HENT:      Head: Normocephalic and atraumatic.      Mouth/Throat:      Mouth: Mucous membranes are moist.   Eyes:      Extraocular Movements: Extraocular movements intact.      Pupils: Pupils are equal, round, and reactive to light.   Cardiovascular:      Rate and Rhythm: Normal rate and regular rhythm.      Pulses: Normal pulses.   Pulmonary:      Effort: Pulmonary effort is normal. No respiratory distress.      Breath sounds: Normal breath sounds. No wheezing.   Abdominal:      General: There is no distension.      Palpations: Abdomen is soft. There is no mass.      Tenderness: There is no abdominal tenderness. There is no guarding or rebound.      Hernia: No hernia is present.   Musculoskeletal:         General: No swelling or deformity. Normal range of motion.      Cervical back: Normal range of motion and neck supple.      Right lower leg: No edema.      Left lower leg: No edema.   Skin:     General: Skin is warm and dry.      Coloration: Skin is not jaundiced.   Neurological:      General: No focal deficit present.      Mental Status: He is alert and oriented to person, place, and time.   Psychiatric:         Mood and Affect: Mood normal.         Behavior: Behavior normal.

## 2024-03-01 ENCOUNTER — OFFICE VISIT (OUTPATIENT)
Dept: FAMILY MEDICINE CLINIC | Facility: HOSPITAL | Age: 49
End: 2024-03-01
Payer: COMMERCIAL

## 2024-03-01 VITALS
TEMPERATURE: 97.4 F | DIASTOLIC BLOOD PRESSURE: 78 MMHG | HEIGHT: 66 IN | HEART RATE: 85 BPM | OXYGEN SATURATION: 97 % | WEIGHT: 154 LBS | BODY MASS INDEX: 24.75 KG/M2 | RESPIRATION RATE: 16 BRPM | SYSTOLIC BLOOD PRESSURE: 126 MMHG

## 2024-03-01 DIAGNOSIS — Z13.6 SCREENING FOR CARDIOVASCULAR CONDITION: ICD-10-CM

## 2024-03-01 DIAGNOSIS — Z00.00 ANNUAL PHYSICAL EXAM: ICD-10-CM

## 2024-03-01 DIAGNOSIS — H61.23 HEARING LOSS OF BOTH EARS DUE TO CERUMEN IMPACTION: Primary | ICD-10-CM

## 2024-03-01 PROBLEM — R14.0 BLOATING: Status: RESOLVED | Noted: 2019-09-09 | Resolved: 2024-03-01

## 2024-03-01 PROBLEM — R21 RASH AND NONSPECIFIC SKIN ERUPTION: Status: RESOLVED | Noted: 2023-02-24 | Resolved: 2024-03-01

## 2024-03-01 PROCEDURE — 99396 PREV VISIT EST AGE 40-64: CPT | Performed by: INTERNAL MEDICINE

## 2024-03-01 PROCEDURE — 99213 OFFICE O/P EST LOW 20 MIN: CPT | Performed by: INTERNAL MEDICINE

## 2024-03-01 PROCEDURE — 69210 REMOVE IMPACTED EAR WAX UNI: CPT | Performed by: INTERNAL MEDICINE

## 2024-03-01 RX ORDER — TRIAMCINOLONE ACETONIDE 1 MG/G
CREAM TOPICAL
COMMUNITY
Start: 2023-12-04

## 2024-03-01 NOTE — PROGRESS NOTES
ADULT ANNUAL PHYSICAL  [facility] [place] [address]    NAME: Arpan Hayes  AGE: 48 y.o. SEX: male  : 1975     DATE: 3/1/2024     Assessment and Plan:     Problem List Items Addressed This Visit    None  Visit Diagnoses     Hearing loss of both ears due to cerumen impaction    -  Primary    Relevant Orders    Ear cerumen removal (Completed)    Annual physical exam        Screening for cardiovascular condition        Relevant Orders    Lipid panel          Immunizations and preventive care screenings were discussed with patient today. Appropriate education was printed on patient's after visit summary.        Counseling:  Exercise: the importance of regular exercise/physical activity was discussed. Recommend exercise 3-5 times per week for at least 30 minutes.   Up-to-date on colon cancer screening, will start prostate cancer screening at age 52  Patient has family history of premature coronary artery disease on his father side.  Will assess her ASCVD risk with lipid profile      Depression Screening and Follow-up Plan: Patient was screened for depression during today's encounter. They screened negative with a PHQ-2 score of 0.        Return in about 6 months (around 2024) for Recheck.     Chief Complaint:     Chief Complaint   Patient presents with   • Annual Exam      History of Present Illness:     Adult Annual Physical   Patient here for a comprehensive physical exam. The patient reports problems - bilateral cerumen impaction causing decreased hearing over the months .    Diet and Physical Activity  Diet/Nutrition: well balanced diet.   Exercise: no formal exercise.      Depression Screening  PHQ-2/9 Depression Screening    Little interest or pleasure in doing things: 0 - not at all  Feeling down, depressed, or hopeless: 0 - not at all  PHQ-2 Score: 0  PHQ-2 Interpretation: Negative depression screen       General Health  Sleep: sleeps well.    Hearing: decreased - bilateral.  Vision: wears glasses.   Dental: regular dental visits.        Health  Symptoms include: none         Review of Systems:     Review of Systems   Constitutional:  Negative for fever.   HENT:  Positive for hearing loss.    Eyes:  Negative for visual disturbance.   Respiratory:  Negative for cough and shortness of breath.    Cardiovascular:  Negative for chest pain and palpitations.   Gastrointestinal:  Negative for abdominal pain, blood in stool, constipation and diarrhea.   Endocrine: Negative for polydipsia and polyphagia.   Genitourinary:  Negative for hematuria.   Musculoskeletal:  Negative for myalgias.   Skin:  Negative for rash.   Neurological:  Negative for headaches.   Psychiatric/Behavioral:  Negative for dysphoric mood.    All other systems reviewed and are negative.     Past Medical History:     Past Medical History:   Diagnosis Date   • GERD (gastroesophageal reflux disease)    • PVC (premature ventricular contraction) resolved   • Tenosynovitis of wrist       Past Surgical History:     Past Surgical History:   Procedure Laterality Date   • COLONOSCOPY     • ESOPHAGOGASTRODUODENOSCOPY N/A 10/20/2016    Procedure: ESOPHAGOGASTRODUODENOSCOPY (EGD);  Surgeon: Philip Turcios MD;  Location: [facility];  Service:    • FEMUR SURGERY      Femur repair   • VASECTOMY        Family History:     Family History   Problem Relation Age of Onset   • Leukemia Mother    • Corrales's esophagus Father    • Coronary artery disease Father    • Diabetes Father    • Heart disease Father    • Coronary artery disease Maternal Grandfather    • Substance Abuse Neg Hx    • Mental illness Neg Hx       Social History:     Social History     Socioeconomic History   • Marital status: /Civil Union     Spouse name: None   • Number of children: None   • Years of education: None   • Highest education level: None   Occupational History   • None   Tobacco Use   • Smoking status: Never   • Smokeless  "tobacco: Never   Vaping Use   • Vaping status: Never Used   Substance and Sexual Activity   • Alcohol use: Yes     Comment: occasional wine   • Drug use: No   • Sexual activity: None   Other Topics Concern   • None   Social History Narrative    No alcohol use - As per [employer]     Wears seatbelt    Regular dental care    Limited exercise    No living will     Social Determinants of Health     Financial Resource Strain: Not on file   Food Insecurity: Not on file   Transportation Needs: No Transportation Needs (1/7/2021)    PRAPARE - Transportation    • Lack of Transportation (Medical): No    • Lack of Transportation (Non-Medical): No   Physical Activity: Inactive (1/7/2021)    Exercise Vital Sign    • Days of Exercise per Week: 0 days    • Minutes of Exercise per Session: 0 min   Stress: Stress Concern Present (1/7/2021)    [employer] [place] [employer] - Occupational Stress Questionnaire    • Feeling of Stress : Rather much   Social Connections: Not on file   Intimate Partner Violence: Not on file   Housing Stability: Not on file      Current Medications:     Current Outpatient Medications   Medication Sig Dispense Refill   • Calcium Carb-Cholecalciferol (CALCIUM 600 + D PO) Take by mouth 1 daily     • Multiple Vitamin (MULTIVITAMIN) tablet Take 1 tablet by mouth daily.     • omeprazole (PriLOSEC) 40 MG capsule Take 1 capsule (40 mg total) by mouth 2 (two) times a day (Patient taking differently: Take 40 mg by mouth daily) 240 capsule 0   • triamcinolone (KENALOG) 0.1 % cream APPLY TWICE DAILY TO ECZEMA UP TO 2 WEEKS/MONTH AS NEEDED. (Patient not taking: Reported on 3/1/2024)       No current facility-administered medications for this visit.      Allergies:     Allergies   Allergen Reactions   • Amoxicillin-Pot Clavulanate GI Intolerance      Physical Exam:     /78   Pulse 85   Temp (!) 97.4 °F (36.3 °C) (Tympanic)   Resp 16   Ht 5' 6\" (1.676 m)   Wt 69.9 kg (154 lb)   SpO2 97%   BMI 24.86 " "kg/m²     Physical Exam  Constitutional:       General: He is not in acute distress.     Appearance: He is well-developed. He is not toxic-appearing.   HENT:      Head: Normocephalic.      Right Ear: Tympanic membrane normal. There is impacted cerumen.      Left Ear: Tympanic membrane normal. There is impacted cerumen.   Eyes:      Conjunctiva/sclera: Conjunctivae normal.   Cardiovascular:      Rate and Rhythm: Normal rate and regular rhythm.      Heart sounds: No murmur heard.  Pulmonary:      Effort: No respiratory distress.      Breath sounds: No wheezing or rales.   Abdominal:      General: Bowel sounds are normal.      Palpations: Abdomen is soft.      Tenderness: There is no abdominal tenderness.   Musculoskeletal:      Cervical back: Neck supple.   Skin:     General: Skin is warm and dry.   Neurological:      Mental Status: He is alert and oriented to person, place, and time.      Cranial Nerves: No cranial nerve deficit.      Motor: No weakness.   Psychiatric:         Mood and Affect: Mood normal.         Thought Content: Thought content normal.        Ear cerumen removal    Date/Time: 3/1/2024 1:40 PM    Performed by: Tyler Robertson MD  Authorized by: Tyler Robertson MD  Universal Protocol:  Consent: Verbal consent obtained.  Risks and benefits: risks, benefits and alternatives were discussed  Consent given by: patient  Time out: Immediately prior to procedure a \"time out\" was called to verify the correct patient, procedure, equipment, support staff and site/side marked as required.    Patient location:  Clinic  Procedure details:     Local anesthetic:  None    Location:  L ear and R ear    Procedure type: irrigation with instrumentation      Instrumentation: curette      Approach:  External  Post-procedure details:     Complication:  None    Hearing quality:  Improved    Patient tolerance of procedure:  Tolerated well, no immediate complications     Tyler Robertson MD  [facility] [address]

## 2024-03-22 ENCOUNTER — APPOINTMENT (OUTPATIENT)
Dept: LAB | Facility: CLINIC | Age: 49
End: 2024-03-22
Payer: COMMERCIAL

## 2024-03-22 DIAGNOSIS — K21.9 GERD (GASTROESOPHAGEAL REFLUX DISEASE): ICD-10-CM

## 2024-03-22 DIAGNOSIS — K44.9 HIATAL HERNIA: ICD-10-CM

## 2024-03-22 DIAGNOSIS — Z13.6 SCREENING FOR CARDIOVASCULAR CONDITION: ICD-10-CM

## 2024-03-22 LAB
ALBUMIN SERPL BCP-MCNC: 4.3 G/DL (ref 3.5–5)
ALP SERPL-CCNC: 72 U/L (ref 34–104)
ALT SERPL W P-5'-P-CCNC: 17 U/L (ref 7–52)
ANION GAP SERPL CALCULATED.3IONS-SCNC: 10 MMOL/L (ref 4–13)
AST SERPL W P-5'-P-CCNC: 15 U/L (ref 13–39)
BASOPHILS # BLD AUTO: 0.04 THOUSANDS/ÂΜL (ref 0–0.1)
BASOPHILS NFR BLD AUTO: 0 % (ref 0–1)
BILIRUB SERPL-MCNC: 0.52 MG/DL (ref 0.2–1)
BUN SERPL-MCNC: 15 MG/DL (ref 5–25)
CALCIUM SERPL-MCNC: 9.1 MG/DL (ref 8.4–10.2)
CHLORIDE SERPL-SCNC: 102 MMOL/L (ref 96–108)
CHOLEST SERPL-MCNC: 138 MG/DL
CO2 SERPL-SCNC: 25 MMOL/L (ref 21–32)
CREAT SERPL-MCNC: 0.98 MG/DL (ref 0.6–1.3)
EOSINOPHIL # BLD AUTO: 0.1 THOUSAND/ÂΜL (ref 0–0.61)
EOSINOPHIL NFR BLD AUTO: 1 % (ref 0–6)
ERYTHROCYTE [DISTWIDTH] IN BLOOD BY AUTOMATED COUNT: 14.6 % (ref 11.6–15.1)
GFR SERPL CREATININE-BSD FRML MDRD: 92 ML/MIN/1.73SQ M
GLUCOSE P FAST SERPL-MCNC: 90 MG/DL (ref 65–99)
HCT VFR BLD AUTO: 42.1 % (ref 36.5–49.3)
HDLC SERPL-MCNC: 55 MG/DL
HGB BLD-MCNC: 12.8 G/DL (ref 12–17)
IMM GRANULOCYTES # BLD AUTO: 0.05 THOUSAND/UL (ref 0–0.2)
IMM GRANULOCYTES NFR BLD AUTO: 1 % (ref 0–2)
LDLC SERPL CALC-MCNC: 73 MG/DL (ref 0–100)
LYMPHOCYTES # BLD AUTO: 1.31 THOUSANDS/ÂΜL (ref 0.6–4.47)
LYMPHOCYTES NFR BLD AUTO: 13 % (ref 14–44)
MCH RBC QN AUTO: 21.6 PG (ref 26.8–34.3)
MCHC RBC AUTO-ENTMCNC: 30.4 G/DL (ref 31.4–37.4)
MCV RBC AUTO: 71 FL (ref 82–98)
MONOCYTES # BLD AUTO: 1.29 THOUSAND/ÂΜL (ref 0.17–1.22)
MONOCYTES NFR BLD AUTO: 13 % (ref 4–12)
NEUTROPHILS # BLD AUTO: 7.04 THOUSANDS/ÂΜL (ref 1.85–7.62)
NEUTS SEG NFR BLD AUTO: 72 % (ref 43–75)
NONHDLC SERPL-MCNC: 83 MG/DL
NRBC BLD AUTO-RTO: 0 /100 WBCS
PLATELET # BLD AUTO: 236 THOUSANDS/UL (ref 149–390)
PMV BLD AUTO: 9.6 FL (ref 8.9–12.7)
POTASSIUM SERPL-SCNC: 3.9 MMOL/L (ref 3.5–5.3)
PROT SERPL-MCNC: 7.3 G/DL (ref 6.4–8.4)
RBC # BLD AUTO: 5.92 MILLION/UL (ref 3.88–5.62)
SODIUM SERPL-SCNC: 137 MMOL/L (ref 135–147)
TRIGL SERPL-MCNC: 49 MG/DL
WBC # BLD AUTO: 9.83 THOUSAND/UL (ref 4.31–10.16)

## 2024-03-22 PROCEDURE — 80053 COMPREHEN METABOLIC PANEL: CPT

## 2024-03-22 PROCEDURE — 93005 ELECTROCARDIOGRAM TRACING: CPT

## 2024-03-22 PROCEDURE — 80061 LIPID PANEL: CPT

## 2024-03-22 PROCEDURE — 36415 COLL VENOUS BLD VENIPUNCTURE: CPT

## 2024-03-22 PROCEDURE — 85025 COMPLETE CBC W/AUTO DIFF WBC: CPT

## 2024-03-27 LAB
ATRIAL RATE: 88 BPM
P AXIS: 83 DEGREES
PR INTERVAL: 126 MS
QRS AXIS: 44 DEGREES
QRSD INTERVAL: 94 MS
QT INTERVAL: 368 MS
QTC INTERVAL: 445 MS
T WAVE AXIS: 58 DEGREES
VENTRICULAR RATE: 88 BPM

## 2024-03-27 PROCEDURE — 93010 ELECTROCARDIOGRAM REPORT: CPT | Performed by: INTERNAL MEDICINE

## 2024-05-16 DIAGNOSIS — K21.9 GASTROESOPHAGEAL REFLUX DISEASE WITHOUT ESOPHAGITIS: ICD-10-CM

## 2024-05-16 RX ORDER — OMEPRAZOLE 40 MG/1
40 CAPSULE, DELAYED RELEASE ORAL 2 TIMES DAILY
Qty: 240 CAPSULE | Refills: 0 | Status: SHIPPED | OUTPATIENT
Start: 2024-05-16 | End: 2024-09-13

## 2024-07-10 DIAGNOSIS — K21.9 GASTROESOPHAGEAL REFLUX DISEASE WITHOUT ESOPHAGITIS: ICD-10-CM

## 2024-07-10 RX ORDER — OMEPRAZOLE 40 MG/1
40 CAPSULE, DELAYED RELEASE ORAL DAILY
Qty: 100 CAPSULE | Refills: 1 | Status: SHIPPED | OUTPATIENT
Start: 2024-07-10

## 2024-08-30 ENCOUNTER — OFFICE VISIT (OUTPATIENT)
Dept: FAMILY MEDICINE CLINIC | Facility: HOSPITAL | Age: 49
End: 2024-08-30
Payer: COMMERCIAL

## 2024-08-30 VITALS
DIASTOLIC BLOOD PRESSURE: 80 MMHG | RESPIRATION RATE: 16 BRPM | OXYGEN SATURATION: 98 % | WEIGHT: 152 LBS | HEART RATE: 68 BPM | HEIGHT: 66 IN | SYSTOLIC BLOOD PRESSURE: 132 MMHG | BODY MASS INDEX: 24.43 KG/M2 | TEMPERATURE: 97.7 F

## 2024-08-30 DIAGNOSIS — K21.9 GASTROESOPHAGEAL REFLUX DISEASE WITHOUT ESOPHAGITIS: ICD-10-CM

## 2024-08-30 DIAGNOSIS — H61.22 HEARING LOSS OF LEFT EAR DUE TO CERUMEN IMPACTION: Primary | ICD-10-CM

## 2024-08-30 DIAGNOSIS — R03.0 PREHYPERTENSION: ICD-10-CM

## 2024-08-30 PROCEDURE — 69209 REMOVE IMPACTED EAR WAX UNI: CPT | Performed by: INTERNAL MEDICINE

## 2024-08-30 PROCEDURE — 99213 OFFICE O/P EST LOW 20 MIN: CPT | Performed by: INTERNAL MEDICINE

## 2024-08-30 NOTE — ASSESSMENT & PLAN NOTE
Patient gets heartburn sometimes when he lays down.  He stable on omeprazole.  Surgery is contemplated.

## 2024-08-30 NOTE — ASSESSMENT & PLAN NOTE
Patient has borderline blood pressure.  He already consumes a healthy diet low in salt.  He will increase physical activity.  Will recheck blood pressure in 6 months at the next appointment.    I asked him to bring in his blood pressure meter with him.    Will consider starting him on losartan 25 mg daily if blood pressure increases    I will check electrolytes, renal function, thyroid function, cholesterol before next appointment

## 2024-08-30 NOTE — PROGRESS NOTES
"Ambulatory Visit  Name: Arpan Hayes      : 1975      MRN: [id number]  Encounter Provider: Tyler Robertson MD  Encounter Date: 2024   Encounter department: [facility] [address]    Assessment & Plan   1. Hearing loss of left ear due to cerumen impaction  -     Ear cerumen removal  2. Gastroesophageal reflux disease without esophagitis  Assessment & Plan:  Patient gets heartburn sometimes when he lays down.  He stable on omeprazole.  Surgery is contemplated.  Orders:  -     CBC and differential; Future; Expected date: 2025  3. Prehypertension  Assessment & Plan:  Patient has borderline blood pressure.  He already consumes a healthy diet low in salt.  He will increase physical activity.  Will recheck blood pressure in 6 months at the next appointment.    I asked him to bring in his blood pressure meter with him.    Will consider starting him on losartan 25 mg daily if blood pressure increases    I will check electrolytes, renal function, thyroid function, cholesterol before next appointment  Orders:  -     Comprehensive metabolic panel; Future; Expected date: 2025  -     TSH, 3rd generation with Free T4 reflex; Future; Expected date: 2025  -     Lipid panel; Future; Expected date: 2025       History of Present Illness     HPI    Review of Systems    Objective     /80 (BP Location: Left arm, Patient Position: Sitting)   Pulse 68   Temp 97.7 °F (36.5 °C) (Tympanic)   Resp 16   Ht 5' 6\" (1.676 m)   Wt 68.9 kg (152 lb)   SpO2 98%   BMI 24.53 kg/m²     Physical Exam  Constitutional:       General: He is not in acute distress.     Appearance: He is not toxic-appearing.   HENT:      Right Ear: Tympanic membrane normal.      Left Ear: Tympanic membrane normal. There is impacted cerumen.   Cardiovascular:      Rate and Rhythm: Normal rate and regular rhythm.      Heart sounds: No murmur heard.  Pulmonary:      Effort: No respiratory distress.      " "Breath sounds: No wheezing or rales.   Neurological:      Mental Status: He is alert.       Ear cerumen removal    Date/Time: 8/30/2024 10:40 AM    Performed by: Tyler Robertson MD  Authorized by: Tyler Robertson MD  Universal Protocol:  procedure performed by consultantConsent: Verbal consent obtained.  Risks and benefits: risks, benefits and alternatives were discussed  Consent given by: patient  Time out: Immediately prior to procedure a \"time out\" was called to verify the correct patient, procedure, equipment, support staff and site/side marked as required.    Patient location:  Clinic  Procedure details:     Local anesthetic:  None    Location:  L ear    Procedure type: irrigation only      Approach:  External  Post-procedure details:     Complication:  None    Hearing quality:  Improved    Patient tolerance of procedure:  Tolerated well, no immediate complications       Administrative Statements           "

## 2024-10-04 ENCOUNTER — TELEPHONE (OUTPATIENT)
Dept: CARDIOLOGY CLINIC | Facility: CLINIC | Age: 49
End: 2024-10-04

## 2024-10-04 DIAGNOSIS — I48.91 ATRIAL FIBRILLATION, UNSPECIFIED TYPE (HCC): Primary | ICD-10-CM

## 2024-10-04 NOTE — TELEPHONE ENCOUNTER
Just wanted to make everyone aware that patient may be sending EKG's from his Apple Watch.  He will also be getting a Zio monitor per Dr. Smith.  If you see strips, please forward to Dr. Smith for review.

## 2024-10-09 ENCOUNTER — CLINICAL SUPPORT (OUTPATIENT)
Dept: CARDIOLOGY CLINIC | Facility: CLINIC | Age: 49
End: 2024-10-09
Payer: COMMERCIAL

## 2024-10-09 DIAGNOSIS — R03.0 PREHYPERTENSION: ICD-10-CM

## 2024-10-09 DIAGNOSIS — I48.91 ATRIAL FIBRILLATION, UNSPECIFIED TYPE (HCC): Primary | ICD-10-CM

## 2024-10-09 PROCEDURE — 93246 EXT ECG>7D<15D RECORDING: CPT | Performed by: INTERNAL MEDICINE

## 2024-10-09 NOTE — PROGRESS NOTES
Patient presents in office today under the direction of Dr Smith for placement of 1 of 2  Zio XT monitor. As per patient, he will be back in office in 12 days and will like to have 2nd Zio monitor replaced.     As all instructions were given patient verbalized understanding.

## 2024-10-14 ENCOUNTER — HOSPITAL ENCOUNTER (OUTPATIENT)
Dept: NON INVASIVE DIAGNOSTICS | Facility: CLINIC | Age: 49
Discharge: HOME | End: 2024-10-14
Payer: COMMERCIAL

## 2024-10-14 VITALS
SYSTOLIC BLOOD PRESSURE: 130 MMHG | DIASTOLIC BLOOD PRESSURE: 80 MMHG | WEIGHT: 152 LBS | BODY MASS INDEX: 24.43 KG/M2 | HEIGHT: 66 IN | HEART RATE: 98 BPM

## 2024-10-14 DIAGNOSIS — I48.91 ATRIAL FIBRILLATION, UNSPECIFIED TYPE (HCC): ICD-10-CM

## 2024-10-14 DIAGNOSIS — R03.0 PREHYPERTENSION: ICD-10-CM

## 2024-10-14 LAB
AORTIC ROOT: 2.7 CM
APICAL FOUR CHAMBER EJECTION FRACTION: 60 %
ASCENDING AORTA: 2.5 CM
BSA FOR ECHO PROCEDURE: 1.78 M2
FRACTIONAL SHORTENING: 30 (ref 28–44)
INTERVENTRICULAR SEPTUM IN DIASTOLE (PARASTERNAL SHORT AXIS VIEW): 0.8 CM
INTERVENTRICULAR SEPTUM: 0.8 CM (ref 0.6–1.1)
LAAS-AP2: 14.2 CM2
LAAS-AP4: 16.6 CM2
LEFT ATRIUM SIZE: 3.1 CM
LEFT ATRIUM VOLUME (MOD BIPLANE): 37 ML
LEFT ATRIUM VOLUME INDEX (MOD BIPLANE): 20.8 ML/M2
LEFT INTERNAL DIMENSION IN SYSTOLE: 3.2 CM (ref 2.1–4)
LEFT VENTRICULAR INTERNAL DIMENSION IN DIASTOLE: 4.6 CM (ref 3.5–6)
LEFT VENTRICULAR POSTERIOR WALL IN END DIASTOLE: 0.7 CM
LEFT VENTRICULAR STROKE VOLUME: 57 ML
LVSV (TEICH): 57 ML
RA PRESSURE ESTIMATED: 3 MMHG
RIGHT ATRIUM AREA SYSTOLE A4C: 9.7 CM2
RIGHT VENTRICLE ID DIMENSION: 2.8 CM
SL CV LEFT ATRIUM LENGTH A2C: 4.9 CM
SL CV LV EF: 55
SL CV PED ECHO LEFT VENTRICLE DIASTOLIC VOLUME (MOD BIPLANE) 2D: 98 ML
SL CV PED ECHO LEFT VENTRICLE SYSTOLIC VOLUME (MOD BIPLANE) 2D: 41 ML
TRICUSPID ANNULAR PLANE SYSTOLIC EXCURSION: 2 CM

## 2024-10-14 PROCEDURE — 93306 TTE W/DOPPLER COMPLETE: CPT

## 2024-10-14 PROCEDURE — 93306 TTE W/DOPPLER COMPLETE: CPT | Performed by: INTERNAL MEDICINE

## 2024-10-21 ENCOUNTER — OFFICE VISIT (OUTPATIENT)
Dept: CARDIOLOGY CLINIC | Facility: CLINIC | Age: 49
End: 2024-10-21
Payer: COMMERCIAL

## 2024-10-21 VITALS
HEIGHT: 66 IN | BODY MASS INDEX: 25.23 KG/M2 | HEART RATE: 76 BPM | WEIGHT: 157 LBS | SYSTOLIC BLOOD PRESSURE: 118 MMHG | DIASTOLIC BLOOD PRESSURE: 70 MMHG

## 2024-10-21 DIAGNOSIS — I47.19 ATRIAL TACHYCARDIA (HCC): ICD-10-CM

## 2024-10-21 DIAGNOSIS — R05.9 COUGH IN ADULT: ICD-10-CM

## 2024-10-21 DIAGNOSIS — I49.3 PVC'S (PREMATURE VENTRICULAR CONTRACTIONS): Primary | ICD-10-CM

## 2024-10-21 PROCEDURE — 93246 EXT ECG>7D<15D RECORDING: CPT | Performed by: INTERNAL MEDICINE

## 2024-10-21 PROCEDURE — 99205 OFFICE O/P NEW HI 60 MIN: CPT | Performed by: INTERNAL MEDICINE

## 2024-10-21 PROCEDURE — 93000 ELECTROCARDIOGRAM COMPLETE: CPT | Performed by: INTERNAL MEDICINE

## 2024-10-21 RX ORDER — NEBIVOLOL 2.5 MG/1
2.5 TABLET ORAL DAILY
Qty: 90 TABLET | Refills: 3 | Status: SHIPPED | OUTPATIENT
Start: 2024-10-21

## 2024-10-21 NOTE — PROGRESS NOTES
EPS Consultation/New Patient Evaluation - Arpan Hayes 49 y.o. male MRN: [id number]           ASSESSMENT:  1. PVC's (premature ventricular contractions)  POCT ECG      2. Atrial tachycardia (HCC)  CT coronary calcium score    nebivolol (BYSTOLIC) 2.5 mg tablet      3. Cough in adult          ECG today shows normal sinus rhythm      PLAN:  #1.  Paroxysmal atrial tachycardia he appears to definitely have a paroxysmal atrial tachycardia.  I explained to Dr. Hayes that this is certainly not a life-threatening arrhythmia.  I do not have any clear evidence of paroxysmal atrial fibrillation.  He is wearing a 1 month monitor that should provide better tracings then his iPhone EKG regardless his episodes are self-limited and do not appear to cause any hemodynamic sequelae.  He is certainly safe to continue flying and I explained that he should not have any restrictions related to this arrhythmia.    I do recommend a trial of a low-dose beta-blocker and if he has any symptoms on this we could try a low-dose calcium channel blocker.  I explained also that flecainide would likely work for this and certainly an ablation would be a possibility    At this point in time I believe a low-dose beta-blocker will likely suppress this arrhythmia.  I am awaiting the results of his 1 month monitor to get a better idea of the frequency and duration of these episodes    He also has PVCs that are benign and he has had these since for many years.  Interestingly his PVCs go away with exercise and this rhythm appears to be more induced with exercise or activity further suggesting it is an automatic atrial tachycardia.    Echocardiogram reviewed personally shows structurally normal heart.  All of his iPhone tracings that he saved were reviewed personally today    In terms of his cough I suspect this is just a postviral cough that we will ramón.  It is possible I explained that when he is having brief runs of paroxysmal atrial tachycardia  it could induce a cough the sensation that 1 needs to cough.    We will try low-dose beta-blocker and follow-up in 1 month's time.  I have prescribed Bystolic 2.5 mg to be taken in the evening he will start this tonight    CC/HPI:   Dr. Hayes is seen in consultation in the office today regarding recurrent palpitations and based on his iPhone ECG tracings probably paroxysmal atrial tachycardia.  He is an extremely healthy 49-year-old male with cardiac history significant for PVCs since his early adulthood benign in nature evaluated multiple times.  He has no significant structural heart disease works out regularly and has no history of hypertension although he is developed some prehypertension over the years and very favorable lipid profile.    On September 19 the day before he came down with a viral illness he felt his heart suddenly start racing at approximately 8:44 AM.  His heart raced for 6 to 7 minutes and a tracing shows a tachycardia because it is an iPhone tracing with only 1-lead it is difficult to tell whether that it is most likely paroxysmal atrial tachycardia    On October 3 he had a tracing that shows sinus rhythm with probable premature atrial contractions and nonsustained atrial tachycardia with aberrant conduction.  Although I cannot rule out nonsustained ventricular tachycardia based on further tracings it is most likely atrial tachycardia.    October 3 9:43 PM there is a tracing that shows 2 PVCs otherwise sinus rhythm    October 4 a single PVC is seen    October 5 there is clearly a tracing that shows sinus rhythm with a brief run of nonsustained atrial tachycardia    October 10 PVCs are again seen with normal sinus rhythm    October 12 at 12:10 PM there is a tracing that clearly shows paroxysmal atrial tachycardia cycle length 360 ms beats per minute 166 bpm    October 14 again is seen paroxysmal atrial tachycardia this occurred in the setting of sinus tachycardia or elevated baseline heart  "rate    There are other tracings that see clearly show premature atrial contractions and atrial trigeminy pattern from October 14    There is a tracing from October 15 1:46 PM that shows sinus rhythm with premature atrial contractions and brief bursts of nonsustained atrial tachycardia    The first episode occurred approximately 4 weeks ago    A day after that episode he developed a \"\" barky cough.  He still has the cough although it is improving.  He did have an upper respiratory infection    He is now experiencing palpitations every day lasting a few minutes at a time approximately twice a day.    He has no hypertension but has been told he has prehypertension    There is a strong family history of heart disease in his father only who had coronary artery disease but had multiple risk factors for coronary artery disease    Echocardiographic images personally reviewed show a structurally normal heart        ROS   For cough positive for palpitations no chest pain or shortness of breath with exertion.  His palpitations seem to come on more when he is exercising.  In the past exercise would make his palpitations go away.    Objective:     Vitals: Blood pressure 118/70, pulse 76, height 5' 6\" (1.676 m), weight 71.2 kg (157 lb)., Body mass index is 25.34 kg/m².,        Physical Exam:    GEN: Arpan Hayes appears well, alert and oriented x 3, pleasant and cooperative   HEENT: pupils equal, round, and reactive to light; extraocular muscles intact  NECK: supple, no carotid bruits   HEART: regular rhythm, normal S1 and S2, no murmurs, clicks, gallops or rubs   LUNGS: clear to auscultation bilaterally; no wheezes, rales, or rhonchi   ABDOMEN: normal bowel sounds, soft, no tenderness, no distention  EXTREMITIES: peripheral pulses normal; no clubbing, cyanosis, or edema  NEURO: no focal findings   SKIN: normal without suspicious lesions on exposed skin    Medications:      Current Outpatient Medications:     Calcium " Carb-Cholecalciferol (CALCIUM 600 + D PO), Take by mouth 1 daily, Disp: , Rfl:     Multiple Vitamin (MULTIVITAMIN) tablet, Take 1 tablet by mouth daily., Disp: , Rfl:     nebivolol (BYSTOLIC) 2.5 mg tablet, Take 1 tablet (2.5 mg total) by mouth daily, Disp: 90 tablet, Rfl: 3    omeprazole (PriLOSEC) 40 MG capsule, TAKE 1 CAPSULE EVERY DAY, Disp: 100 capsule, Rfl: 1    triamcinolone (KENALOG) 0.1 % cream, APPLY TWICE DAILY TO ECZEMA UP TO 2 WEEKS/MONTH AS NEEDED. (Patient not taking: Reported on 3/1/2024), Disp: , Rfl:      Family History   Problem Relation Age of Onset    Leukemia Mother     Corrales's esophagus Father     Coronary artery disease Father     Diabetes Father     Heart disease Father     Coronary artery disease Maternal Grandfather     Substance Abuse Neg Hx     Mental illness Neg Hx      Social History     Socioeconomic History    Marital status: /Civil Union     Spouse name: Not on file    Number of children: Not on file    Years of education: Not on file    Highest education level: Not on file   Occupational History    Not on file   Tobacco Use    Smoking status: Never    Smokeless tobacco: Never   Vaping Use    Vaping status: Never Used   Substance and Sexual Activity    Alcohol use: Yes     Comment: occasional wine    Drug use: No    Sexual activity: Not on file   Other Topics Concern    Not on file   Social History Narrative    No alcohol use - As per [employer]     Wears seatbelt    Regular dental care    Limited exercise    No living will     Social Determinants of Health     Financial Resource Strain: Not on file   Food Insecurity: Not on file   Transportation Needs: No Transportation Needs (1/7/2021)    PRAPARE - Transportation     Lack of Transportation (Medical): No     Lack of Transportation (Non-Medical): No   Physical Activity: Inactive (1/7/2021)    Exercise Vital Sign     Days of Exercise per Week: 0 days     Minutes of Exercise per Session: 0 min   Stress: Stress Concern Present  (1/7/2021)    [employer] [place] [employer] - Occupational Stress Questionnaire     Feeling of Stress : Rather much   Social Connections: Not on file   Intimate Partner Violence: Not on file   Housing Stability: Not on file     Social History     Tobacco Use   Smoking Status Never   Smokeless Tobacco Never     Social History     Substance and Sexual Activity   Alcohol Use Yes    Comment: occasional wine       Labs & Results:  Below is the patient's most recent value for Albumin, ALT, AST, BUN, Calcium, Chloride, Cholesterol, CO2, Creatinine, GFR, Glucose, HDL, Hematocrit, Hemoglobin, Hemoglobin A1C, LDL, Magnesium, Phosphorus, Platelets, Potassium, PSA, Sodium, Triglycerides, and WBC.   Lab Results   Component Value Date    ALT 17 03/22/2024    AST 15 03/22/2024    BUN 15 03/22/2024    CALCIUM 9.1 03/22/2024     03/22/2024    CHOL 132 08/13/2015    CO2 25 03/22/2024    CREATININE 0.98 03/22/2024    HDL 55 03/22/2024    HCT 42.1 03/22/2024    HGB 12.8 03/22/2024    HGBA1C 5.3 05/04/2023    MG 2.2 11/05/2021     03/22/2024    K 3.9 03/22/2024     04/10/2015    TRIG 49 03/22/2024    WBC 9.83 03/22/2024     Note: for a comprehensive list of the patient's lab results, access the Results Review activity.            Cardiac testing:   No results found for this or any previous visit.    No results found for this or any previous visit.    No results found for this or any previous visit.    No results found for this or any previous visit.

## 2024-10-28 ENCOUNTER — CLINICAL SUPPORT (OUTPATIENT)
Dept: CARDIOLOGY CLINIC | Facility: CLINIC | Age: 49
End: 2024-10-28
Payer: COMMERCIAL

## 2024-10-28 DIAGNOSIS — I48.91 ATRIAL FIBRILLATION, UNSPECIFIED TYPE (HCC): ICD-10-CM

## 2024-10-28 PROCEDURE — 93248 EXT ECG>7D<15D REV&INTERPJ: CPT

## 2024-11-05 ENCOUNTER — HOSPITAL ENCOUNTER (OUTPATIENT)
Dept: RADIOLOGY | Facility: HOSPITAL | Age: 49
Discharge: HOME | End: 2024-11-05
Attending: INTERNAL MEDICINE
Payer: COMMERCIAL

## 2024-11-05 DIAGNOSIS — I47.19 ATRIAL TACHYCARDIA (HCC): ICD-10-CM

## 2024-11-05 PROCEDURE — 75571 CT HRT W/O DYE W/CA TEST: CPT

## 2024-11-08 ENCOUNTER — CLINICAL SUPPORT (OUTPATIENT)
Dept: CARDIOLOGY CLINIC | Facility: CLINIC | Age: 49
End: 2024-11-08
Payer: COMMERCIAL

## 2024-11-08 DIAGNOSIS — I48.91 ATRIAL FIBRILLATION, UNSPECIFIED TYPE (HCC): ICD-10-CM

## 2024-11-08 PROCEDURE — 93248 EXT ECG>7D<15D REV&INTERPJ: CPT | Performed by: INTERNAL MEDICINE

## 2024-11-19 ENCOUNTER — OFFICE VISIT (OUTPATIENT)
Dept: CARDIOLOGY CLINIC | Facility: CLINIC | Age: 49
End: 2024-11-19
Payer: COMMERCIAL

## 2024-11-19 VITALS
SYSTOLIC BLOOD PRESSURE: 108 MMHG | HEIGHT: 66 IN | BODY MASS INDEX: 25.26 KG/M2 | WEIGHT: 157.2 LBS | HEART RATE: 59 BPM | DIASTOLIC BLOOD PRESSURE: 68 MMHG

## 2024-11-19 DIAGNOSIS — I47.19 ATRIAL TACHYCARDIA (HCC): ICD-10-CM

## 2024-11-19 DIAGNOSIS — I49.3 PVC'S (PREMATURE VENTRICULAR CONTRACTIONS): Primary | ICD-10-CM

## 2024-11-19 DIAGNOSIS — K21.9 GASTROESOPHAGEAL REFLUX DISEASE WITHOUT ESOPHAGITIS: ICD-10-CM

## 2024-11-19 PROCEDURE — 93000 ELECTROCARDIOGRAM COMPLETE: CPT | Performed by: INTERNAL MEDICINE

## 2024-11-19 PROCEDURE — 99214 OFFICE O/P EST MOD 30 MIN: CPT | Performed by: INTERNAL MEDICINE

## 2024-11-19 RX ORDER — FAMOTIDINE 20 MG/1
20 TABLET, FILM COATED ORAL 2 TIMES DAILY
Qty: 180 TABLET | Refills: 3 | Status: SHIPPED | OUTPATIENT
Start: 2024-11-19

## 2024-11-19 RX ORDER — NEBIVOLOL 5 MG/1
5 TABLET ORAL DAILY
Qty: 90 TABLET | Refills: 3 | Status: SHIPPED | OUTPATIENT
Start: 2024-11-19

## 2024-11-19 NOTE — PROGRESS NOTES
EPS Progress Note - Arpan Hayes 49 y.o. male MRN: [id number]           ASSESSMENT:  1. PVC's (premature ventricular contractions)  POCT ECG      2. Atrial tachycardia (HCC)          Echocardiogram 10/24    Left Ventricle: Left ventricular cavity size is normal. Wall thickness is normal. The left ventricular ejection fraction is 55-60%. Systolic function is normal. Wall motion is normal. Diastolic function is normal.    Right Ventricle: Right ventricular cavity size is normal. Systolic function is normal.    Left Atrium: The atrium is normal in size.     The patient's heart rhythm appears irregularly irregular on ECG during the exam, with low amplitude P-waves, giving the appearance of atrial fibrillation. However, mitral valve M-mode, spectral Doppler interrogations of the mitral valve, and tissue Doppler interrogations all demonstrate coordinated atrial contractions suggesting sinus rhythm with frequent premature atrial contractions, not atrial fibrillation.       PLAN:  #1.  Paroxysmal atrial tachycardia continue 5 mg daily of nebivolol his symptoms are approximately 90% relieved he does notice a little bit of fatigue but tolerable and initially when he starts exercise it takes a bit of time for his heart rate to accelerate.  In 3 to 6 months if he would like to try cutting back to 2-1/2 mg daily that would be reasonable  #2.  PVCs longstanding relatively asymptomatic  #3.  Coronary calcium score of 9 excellent lipid profile recheck coronary calcium in 2 years  #4.  GERD he is trying to wean off omeprazole I prescribed Pepcid 20 mg twice daily I personally reached out to his gastroenterologist Dr. Turcios to give him further instructions    He is doing remarkably well we reviewed his lipid profile today which is excellent.  Encouraged exercise and diet.  Will follow-up in 6 months.    HPI:   Interim history follow-up for paroxysmal atrial tachycardia.  Initially started on 2.5 mg of nebivolol once daily and  "there was a decrease in brief runs of atrial tachycardia.  I asked Dr. Hayes to uptitrate to 5 mg daily as tolerated          Review of Systems   Constitutional: Negative for chills and fever.   HENT:  Negative for congestion and sore throat.    Eyes:  Negative for blurred vision and double vision.   Cardiovascular:  Negative for chest pain, claudication, dyspnea on exertion, leg swelling, near-syncope, orthopnea, palpitations, paroxysmal nocturnal dyspnea and syncope.   Respiratory:  Negative for cough, shortness of breath and sputum production.    Hematologic/Lymphatic: Negative for bleeding problem. Does not bruise/bleed easily.   Skin:  Negative for itching and rash.   Musculoskeletal:  Negative for arthritis and joint pain.   Gastrointestinal:  Negative for abdominal pain, nausea and vomiting.   Genitourinary:  Negative for hematuria.   Neurological:  Negative for dizziness, focal weakness, headaches, light-headedness and weakness.   Psychiatric/Behavioral:  Negative for depression. The patient is not nervous/anxious.           Objective:     Vitals: Blood pressure 108/68, pulse 59, height 5' 6\" (1.676 m), weight 71.3 kg (157 lb 3.2 oz)., Body mass index is 25.37 kg/m².,        Physical Exam:    GEN: Arpan Hayes appears well, alert and oriented x 3, pleasant and cooperative   HEENT: pupils equal, round, and reactive to light; extraocular muscles intact  NECK: supple, no carotid bruits   HEART: regular rhythm, normal S1 and S2, no murmurs, clicks, gallops or rubs   LUNGS: clear to auscultation bilaterally; no wheezes, rales, or rhonchi   ABDOMEN: normal bowel sounds, soft, no tenderness, no distention  EXTREMITIES: peripheral pulses normal; no clubbing, cyanosis, or edema  NEURO: no focal findings   SKIN: normal without suspicious lesions on exposed skin    Medications:      Current Outpatient Medications:     Calcium Carb-Cholecalciferol (CALCIUM 600 + D PO), Take by mouth 1 daily, Disp: , Rfl:     " Multiple Vitamin (MULTIVITAMIN) tablet, Take 1 tablet by mouth daily., Disp: , Rfl:     nebivolol (BYSTOLIC) 2.5 mg tablet, Take 1 tablet (2.5 mg total) by mouth daily (Patient taking differently: Take 5 mg by mouth daily), Disp: 90 tablet, Rfl: 3    omeprazole (PriLOSEC) 40 MG capsule, TAKE 1 CAPSULE EVERY DAY, Disp: 100 capsule, Rfl: 1    triamcinolone (KENALOG) 0.1 % cream, APPLY TWICE DAILY TO ECZEMA UP TO 2 WEEKS/MONTH AS NEEDED. (Patient not taking: Reported on 3/1/2024), Disp: , Rfl:      Family History   Problem Relation Age of Onset    Leukemia Mother     Corrales's esophagus Father     Coronary artery disease Father     Diabetes Father     Heart disease Father     Coronary artery disease Maternal Grandfather     Substance Abuse Neg Hx     Mental illness Neg Hx      Social History     Socioeconomic History    Marital status: /Civil Union     Spouse name: Not on file    Number of children: Not on file    Years of education: Not on file    Highest education level: Not on file   Occupational History    Not on file   Tobacco Use    Smoking status: Never    Smokeless tobacco: Never   Vaping Use    Vaping status: Never Used   Substance and Sexual Activity    Alcohol use: Yes     Comment: occasional wine    Drug use: No    Sexual activity: Not on file   Other Topics Concern    Not on file   Social History Narrative    No alcohol use - As per [employer]     Wears seatbelt    Regular dental care    Limited exercise    No living will     Social Drivers of Health     Financial Resource Strain: Not on file   Food Insecurity: Not on file   Transportation Needs: No Transportation Needs (1/7/2021)    PRAPARE - Transportation     Lack of Transportation (Medical): No     Lack of Transportation (Non-Medical): No   Physical Activity: Inactive (1/7/2021)    Exercise Vital Sign     Days of Exercise per Week: 0 days     Minutes of Exercise per Session: 0 min   Stress: Stress Concern Present (1/7/2021)    [place]  [employer] - Occupational Stress Questionnaire     Feeling of Stress : Rather much   Social Connections: Not on file   Intimate Partner Violence: Not on file   Housing Stability: Not on file     Social History     Tobacco Use   Smoking Status Never   Smokeless Tobacco Never     Social History     Substance and Sexual Activity   Alcohol Use Yes    Comment: occasional wine       Labs & Results:  Below is the patient's most recent value for Albumin, ALT, AST, BUN, Calcium, Chloride, Cholesterol, CO2, Creatinine, GFR, Glucose, HDL, Hematocrit, Hemoglobin, Hemoglobin A1C, LDL, Magnesium, Phosphorus, Platelets, Potassium, PSA, Sodium, Triglycerides, and WBC.   Lab Results   Component Value Date    ALT 17 03/22/2024    AST 15 03/22/2024    BUN 15 03/22/2024    CALCIUM 9.1 03/22/2024     03/22/2024    CHOL 132 08/13/2015    CO2 25 03/22/2024    CREATININE 0.98 03/22/2024    HDL 55 03/22/2024    HCT 42.1 03/22/2024    HGB 12.8 03/22/2024    HGBA1C 5.3 05/04/2023    MG 2.2 11/05/2021     03/22/2024    K 3.9 03/22/2024     04/10/2015    TRIG 49 03/22/2024    WBC 9.83 03/22/2024     Note: for a comprehensive list of the patient's lab results, access the Results Review activity.            Cardiac testing:   No results found for this or any previous visit.    No results found for this or any previous visit.    No results found for this or any previous visit.    No results found for this or any previous visit.

## 2024-11-20 PROBLEM — R05.9 COUGH IN ADULT: Status: RESOLVED | Noted: 2024-10-21 | Resolved: 2024-11-20

## 2024-11-22 ENCOUNTER — TELEPHONE (OUTPATIENT)
Age: 49
End: 2024-11-22

## 2024-12-06 ENCOUNTER — OFFICE VISIT (OUTPATIENT)
Dept: GASTROENTEROLOGY | Facility: MEDICAL CENTER | Age: 49
End: 2024-12-06
Payer: COMMERCIAL

## 2024-12-06 VITALS
SYSTOLIC BLOOD PRESSURE: 125 MMHG | TEMPERATURE: 96.4 F | DIASTOLIC BLOOD PRESSURE: 76 MMHG | BODY MASS INDEX: 25.5 KG/M2 | HEART RATE: 65 BPM | WEIGHT: 158 LBS

## 2024-12-06 DIAGNOSIS — K21.9 GASTROESOPHAGEAL REFLUX DISEASE WITHOUT ESOPHAGITIS: ICD-10-CM

## 2024-12-06 DIAGNOSIS — I49.3 PVC'S (PREMATURE VENTRICULAR CONTRACTIONS): Primary | ICD-10-CM

## 2024-12-06 DIAGNOSIS — I47.19 ATRIAL TACHYCARDIA (HCC): ICD-10-CM

## 2024-12-06 PROCEDURE — 99214 OFFICE O/P EST MOD 30 MIN: CPT | Performed by: INTERNAL MEDICINE

## 2024-12-06 NOTE — ASSESSMENT & PLAN NOTE
He has been evaluated by cardiology Dr. Smith.  There does not appear to be any atrial fibrillation.  He has been wearing a cardiac monitor.  He was recommended Bystolic which has been helpful.  Interestingly palpitations and tachycardia can be an effect of PPIs as well.  I recommended for him to consider discontinuing the PPI for a couple of weeks and notice if there is any change in the frequency of his PVCs.  There may be a component of sleep apnea as well which might be contributing to his symptoms.  I have recommended for him to consider doing a sleep study.

## 2024-12-06 NOTE — PROGRESS NOTES
Name: Arpan Hayes      : 1975      MRN: [id number]  Encounter Provider: Philip Turcios MD  Encounter Date: 2024   Encounter department: [facility]  :  Assessment & Plan  PVC's (premature ventricular contractions)  He has been evaluated by cardiology Dr. Smith.  There does not appear to be any atrial fibrillation.  He has been wearing a cardiac monitor.  He was recommended Bystolic which has been helpful.  Interestingly palpitations and tachycardia can be an effect of PPIs as well.  I recommended for him to consider discontinuing the PPI for a couple of weeks and notice if there is any change in the frequency of his PVCs.  There may be a component of sleep apnea as well which might be contributing to his symptoms.  I have recommended for him to consider doing a sleep study.         Gastroesophageal reflux disease without esophagitis  Sleep study  Trial of 20 mg daily for 2 weeks then stop.  If symptoms recur then I would recommend to continue with the 20 mg daily or increase to 40 mg daily.  He will try H2 blockers as well.  He is most likely going to need PPI therapy long-term unless any surgical intervention is undertaken.  I discussed with him that the 2 components of reflux are related to the acid content in the stomach along with a incompetent lower esophageal sphincter which is related to the hiatal hernia.  In order to correct the anatomic dysfunction he will need to undergo a hiatal hernia along with a fundoplication whether it be surgical or endoscopic.  This will hopefully prevent him having the symptoms of acid reflux as well.  He may not need to be on PPI.  We discussed the long-term data with 5 years and suggesting a response of approximately 70 to 80% of the patients however symptoms recur in which case he may need to go back on PPI therapy or H2 blockers.  We also discussed long-term use of PPI.  These medications are relatively safe.  No  causality has been established between PPI and dementia.  I do however recommend patients take a multivitamin daily including calcium and vitamin D, magnesium and B12.  We would also continue to monitor his creatinine.    Orders:    omeprazole (PriLOSEC) 20 mg delayed release capsule; Take 1 capsule (20 mg total) by mouth daily    Atrial tachycardia (HCC)             History of Present Illness   HPI  Arpan Hayes is a 49 y.o. male who presents for follow-up of gastroesophageal reflux disease.  History obtained from: patient    He has had symptoms of acid reflux for a long time.  Several years.  He has been on PPI intermittently.  Most recently he has been on omeprazole 40 mg daily.  He had an endoscopy done last year which showed that his esophagus was normal.  Biopsies were unremarkable.  He had a small type 1 Hiatal Hernia [place] 3 measuring 2 cm.  His stomach appeared normal.  Since then he saw Dr. James Ferguson who recommended doing a hernia repair with toupet fundoplication.  However prior to the surgery he got COVID and the surgery was postponed.  At this time he is looking to see if there is any other alternatives available.  He does have symptoms of regurgitation and reflux.  He has had mild symptoms of dysphagia intermittently.  He had a manometry done which was unremarkable.  There was a 2 cm hiatal hernia seen.  He has nocturnal symptoms as well when he may have to wake up from sleep.  PPIs have helped this significantly.  He is dependent on the PPIs.  He has mild hoarseness as well.  He has a dry cough which usually occurs after eating.  This has been going on for several years.  The PPIs may help the cough slightly.  Recently he also experienced cardiac arrhythmias particularly PVCs and SVT.  He has been seen by cardiology as well.  He also snores significantly at night and at times finds himself sleeping on his abdomen.  These are signs of having sleep apnea.      Review of Systems   Constitutional:  Negative.    HENT: Negative.     Eyes: Negative.    Respiratory: Negative.     Cardiovascular: Negative.    Gastrointestinal:         See HPI   Endocrine: Negative.    Genitourinary: Negative.    Musculoskeletal: Negative.    Skin: Negative.    Allergic/Immunologic: Negative.    Neurological: Negative.    Hematological: Negative.    Psychiatric/Behavioral: Negative.     All other systems reviewed and are negative.         Objective   /76   Pulse 65   Temp (!) 96.4 °F (35.8 °C)   Wt 71.7 kg (158 lb)   BMI 25.50 kg/m²      Physical Exam  Constitutional:       Appearance: Normal appearance. He is well-developed.   HENT:      Head: Normocephalic and atraumatic.   Eyes:      General: No scleral icterus.     Conjunctiva/sclera: Conjunctivae normal.      Pupils: Pupils are equal, round, and reactive to light.   Cardiovascular:      Rate and Rhythm: Normal rate and regular rhythm.      Heart sounds: Normal heart sounds.   Pulmonary:      Effort: Pulmonary effort is normal. No respiratory distress.      Breath sounds: Normal breath sounds.   Abdominal:      General: Bowel sounds are normal. There is no distension.      Palpations: Abdomen is soft. There is no mass.      Tenderness: There is no abdominal tenderness.      Hernia: No hernia is present.   Musculoskeletal:         General: Normal range of motion.      Cervical back: Normal range of motion.   Lymphadenopathy:      Cervical: No cervical adenopathy.   Skin:     General: Skin is warm.   Neurological:      Mental Status: He is alert and oriented to person, place, and time.   Psychiatric:         Behavior: Behavior normal.         Thought Content: Thought content normal.

## 2024-12-06 NOTE — ASSESSMENT & PLAN NOTE
Sleep study  Trial of 20 mg daily for 2 weeks then stop.  If symptoms recur then I would recommend to continue with the 20 mg daily or increase to 40 mg daily.  He will try H2 blockers as well.  He is most likely going to need PPI therapy long-term unless any surgical intervention is undertaken.  I discussed with him that the 2 components of reflux are related to the acid content in the stomach along with a incompetent lower esophageal sphincter which is related to the hiatal hernia.  In order to correct the anatomic dysfunction he will need to undergo a hiatal hernia along with a fundoplication whether it be surgical or endoscopic.  This will hopefully prevent him having the symptoms of acid reflux as well.  He may not need to be on PPI.  We discussed the long-term data with 5 years and suggesting a response of approximately 70 to 80% of the patients however symptoms recur in which case he may need to go back on PPI therapy or H2 blockers.  We also discussed long-term use of PPI.  These medications are relatively safe.  No causality has been established between PPI and dementia.  I do however recommend patients take a multivitamin daily including calcium and vitamin D, magnesium and B12.  We would also continue to monitor his creatinine.    Orders:    omeprazole (PriLOSEC) 20 mg delayed release capsule; Take 1 capsule (20 mg total) by mouth daily

## 2024-12-08 NOTE — PATIENT INSTRUCTIONS
Trial of omeprazole 20 mg daily starting the new year for 2 weeks and then discontinue.  It is important to keep a diary of your symptoms along with the PVCs.  If your symptoms recur you may use over-the-counter antacids for the first 2 to 3 days.  If symptoms persist then you may need to go back on the omeprazole 20 mg daily initially and if that is not enough to control the symptoms and then increase to 40 mg daily.  Please let us know with any updates.  Please discuss having a sleep study done with your primary physician.

## 2024-12-13 ENCOUNTER — DOCUMENTATION (OUTPATIENT)
Dept: OTHER | Facility: HOSPITAL | Age: 49
End: 2024-12-13

## 2024-12-13 DIAGNOSIS — K21.9 GASTROESOPHAGEAL REFLUX DISEASE WITHOUT ESOPHAGITIS: Primary | ICD-10-CM

## 2024-12-13 NOTE — QUICK NOTE
Patient met with and discussed current symptoms of reflux.  Patient is having daily episodes of heartburn, but more regurgitation symptoms, worse at night, and when bending over.  As such would like to reschedule his planned hiatal hernia repair.    We will plan for robotic hiatal hernia repair with partial fundoplication and EGD.  Risks and benefits of surgery were discussed with the patient who is amenable to proceed.

## 2024-12-31 DIAGNOSIS — K21.9 GASTROESOPHAGEAL REFLUX DISEASE WITHOUT ESOPHAGITIS: ICD-10-CM

## 2025-01-25 DIAGNOSIS — K21.9 GASTROESOPHAGEAL REFLUX DISEASE WITHOUT ESOPHAGITIS: ICD-10-CM

## 2025-01-28 RX ORDER — OMEPRAZOLE 40 MG/1
40 CAPSULE, DELAYED RELEASE ORAL DAILY
Qty: 90 CAPSULE | Refills: 2 | OUTPATIENT
Start: 2025-01-28

## 2025-01-31 ENCOUNTER — APPOINTMENT (OUTPATIENT)
Dept: LAB | Facility: CLINIC | Age: 50
End: 2025-01-31
Payer: COMMERCIAL

## 2025-01-31 DIAGNOSIS — R03.0 PREHYPERTENSION: ICD-10-CM

## 2025-01-31 DIAGNOSIS — K21.9 GASTROESOPHAGEAL REFLUX DISEASE WITHOUT ESOPHAGITIS: ICD-10-CM

## 2025-01-31 LAB
ALBUMIN SERPL BCG-MCNC: 4.2 G/DL (ref 3.5–5)
ALP SERPL-CCNC: 70 U/L (ref 34–104)
ALT SERPL W P-5'-P-CCNC: 22 U/L (ref 7–52)
ANION GAP SERPL CALCULATED.3IONS-SCNC: 5 MMOL/L (ref 4–13)
AST SERPL W P-5'-P-CCNC: 18 U/L (ref 13–39)
BASOPHILS # BLD AUTO: 0.04 THOUSANDS/ΜL (ref 0–0.1)
BASOPHILS NFR BLD AUTO: 0 % (ref 0–1)
BILIRUB SERPL-MCNC: 0.43 MG/DL (ref 0.2–1)
BUN SERPL-MCNC: 16 MG/DL (ref 5–25)
CALCIUM SERPL-MCNC: 9.2 MG/DL (ref 8.4–10.2)
CHLORIDE SERPL-SCNC: 99 MMOL/L (ref 96–108)
CHOLEST SERPL-MCNC: 155 MG/DL (ref ?–200)
CO2 SERPL-SCNC: 31 MMOL/L (ref 21–32)
CREAT SERPL-MCNC: 0.93 MG/DL (ref 0.6–1.3)
EOSINOPHIL # BLD AUTO: 0.14 THOUSAND/ΜL (ref 0–0.61)
EOSINOPHIL NFR BLD AUTO: 1 % (ref 0–6)
ERYTHROCYTE [DISTWIDTH] IN BLOOD BY AUTOMATED COUNT: 14.4 % (ref 11.6–15.1)
GFR SERPL CREATININE-BSD FRML MDRD: 97 ML/MIN/1.73SQ M
GLUCOSE P FAST SERPL-MCNC: 101 MG/DL (ref 65–99)
HCT VFR BLD AUTO: 41.8 % (ref 36.5–49.3)
HDLC SERPL-MCNC: 53 MG/DL
HGB BLD-MCNC: 12.7 G/DL (ref 12–17)
IMM GRANULOCYTES # BLD AUTO: 0.05 THOUSAND/UL (ref 0–0.2)
IMM GRANULOCYTES NFR BLD AUTO: 1 % (ref 0–2)
LDLC SERPL CALC-MCNC: 90 MG/DL (ref 0–100)
LYMPHOCYTES # BLD AUTO: 1.81 THOUSANDS/ΜL (ref 0.6–4.47)
LYMPHOCYTES NFR BLD AUTO: 18 % (ref 14–44)
MCH RBC QN AUTO: 21.6 PG (ref 26.8–34.3)
MCHC RBC AUTO-ENTMCNC: 30.4 G/DL (ref 31.4–37.4)
MCV RBC AUTO: 71 FL (ref 82–98)
MONOCYTES # BLD AUTO: 1.17 THOUSAND/ΜL (ref 0.17–1.22)
MONOCYTES NFR BLD AUTO: 12 % (ref 4–12)
NEUTROPHILS # BLD AUTO: 6.79 THOUSANDS/ΜL (ref 1.85–7.62)
NEUTS SEG NFR BLD AUTO: 68 % (ref 43–75)
NONHDLC SERPL-MCNC: 102 MG/DL
NRBC BLD AUTO-RTO: 0 /100 WBCS
PLATELET # BLD AUTO: 241 THOUSANDS/UL (ref 149–390)
PMV BLD AUTO: 10.5 FL (ref 8.9–12.7)
POTASSIUM SERPL-SCNC: 4 MMOL/L (ref 3.5–5.3)
PROT SERPL-MCNC: 7.2 G/DL (ref 6.4–8.4)
RBC # BLD AUTO: 5.89 MILLION/UL (ref 3.88–5.62)
SODIUM SERPL-SCNC: 135 MMOL/L (ref 135–147)
TRIGL SERPL-MCNC: 61 MG/DL (ref ?–150)
TSH SERPL DL<=0.05 MIU/L-ACNC: 2.19 UIU/ML (ref 0.45–4.5)
WBC # BLD AUTO: 10 THOUSAND/UL (ref 4.31–10.16)

## 2025-01-31 PROCEDURE — 36415 COLL VENOUS BLD VENIPUNCTURE: CPT

## 2025-01-31 PROCEDURE — 85025 COMPLETE CBC W/AUTO DIFF WBC: CPT

## 2025-01-31 PROCEDURE — 80053 COMPREHEN METABOLIC PANEL: CPT

## 2025-01-31 PROCEDURE — 80061 LIPID PANEL: CPT

## 2025-01-31 PROCEDURE — 84443 ASSAY THYROID STIM HORMONE: CPT

## 2025-02-07 NOTE — PRE-PROCEDURE INSTRUCTIONS
Pre-Surgery Instructions:   Medication Instructions    Calcium Carb-Cholecalciferol (CALCIUM 600 + D PO) Stop taking 7 days prior to surgery.    Multiple Vitamin (MULTIVITAMIN) tablet Stop taking 7 days prior to surgery.    nebivolol (BYSTOLIC) 5 mg tablet Take day of surgery.    omeprazole (PriLOSEC) 40 MG capsule Take day of surgery.   Medication instructions for day surgery reviewed. Please use only a sip of water to take your instructed medications. Avoid all over the counter vitamins, supplements and NSAIDS for one week prior to surgery per anesthesia guidelines. Tylenol is ok to take as needed.     You will receive a call one business day prior to surgery with an arrival time and hospital directions. If your surgery is scheduled on a Monday, the hospital will be calling you on the Friday prior to your surgery. If you have not heard from anyone by 8pm, please call the hospital supervisor through the hospital  at [phone]. ([place] [phone] or [place] [phone]).    Do not eat or drink anything after midnight the night before your surgery, including candy, mints, lifesavers, or chewing gum. Do not drink alcohol 24hrs before your surgery. Try not to smoke at least 24hrs before your surgery.       Follow the pre surgery showering instructions as listed in the “My Surgical Experience Booklet” or otherwise provided by your surgeon's office. Do not use a blade to shave the surgical area 1 week before surgery. It is okay to use a clean electric clippers up to 24 hours before surgery. Do not apply any lotions, creams, including makeup, cologne, deodorant, or perfumes after showering on the day of your surgery. Do not use dry shampoo, hair spray, hair gel, or any type of hair products.     No contact lenses, eye make-up, or artificial eyelashes. Remove nail polish, including gel polish, and any artificial, gel, or acrylic nails if possible. Remove all jewelry including rings and body piercing  jewelry.     Wear causal clothing that is easy to take on and off. Consider your type of surgery.    Keep any valuables, jewelry, piercings at home. Please bring any specially ordered equipment (sling, braces) if indicated.    Arrange for a responsible person to drive you to and from the hospital on the day of your surgery. Please confirm the visitor policy for the day of your procedure when you receive your phone call with an arrival time.     Call the surgeon's office with any new illnesses, exposures, or additional questions prior to surgery.    Please reference your “My Surgical Experience Booklet” for additional information to prepare for your upcoming surgery.

## 2025-02-17 ENCOUNTER — ANESTHESIA EVENT (OUTPATIENT)
Dept: PERIOP | Facility: HOSPITAL | Age: 50
End: 2025-02-17
Payer: COMMERCIAL

## 2025-02-17 NOTE — ANESTHESIA PREPROCEDURE EVALUATION
Procedure:  Robotic paraesophageal hernia repair w/ fundoplication (Abdomen)  ESOPHAGOGASTRODUODENOSCOPY (EGD) (Abdomen)    Relevant Problems   CARDIO   (+) Atrial tachycardia (HCC)   (+) PVC's (premature ventricular contractions)      GI/HEPATIC   (+) Gastroesophageal reflux disease without esophagitis    PONV     Left Ventricle: Left ventricular cavity size is normal. Wall thickness  is normal. The left ventricular ejection fraction is 55-60%. Systolic  function is normal. Wall motion is normal. Diastolic function is normal.    Right Ventricle: Right ventricular cavity size is normal. Systolic  function is normal.    Left Atrium: The atrium is normal in size.     The patient's heart rhythm appears irregularly irregular on ECG during the  exam, with low amplitude P-waves, giving the appearance of atrial  fibrillation. However, mitral valve M-mode, spectral Doppler  interrogations of the mitral valve, and tissue Doppler interrogations all  demonstrate coordinated atrial contractions suggesting sinus rhythm with  frequent premature atrial contractions, not atrial fibrillation.  Physical Exam    Airway    Mallampati score: II  TM Distance: >3 FB  Neck ROM: full     Dental       Cardiovascular      Pulmonary      Other Findings        Anesthesia Plan  ASA Score- 2     Anesthesia Type- general with ASA Monitors.         Additional Monitors:     Airway Plan: ETT.           Plan Factors-    Chart reviewed.                      Induction- intravenous.    Postoperative Plan-         Informed Consent- Anesthetic plan and risks discussed with patient.  I personally reviewed this patient with the CRNA. Discussed and agreed on the Anesthesia Plan with the CRNA..      NPO Status:  No vitals data found for the desired time range.

## 2025-02-18 ENCOUNTER — ANESTHESIA (OUTPATIENT)
Dept: PERIOP | Facility: HOSPITAL | Age: 50
End: 2025-02-18
Payer: COMMERCIAL

## 2025-02-18 ENCOUNTER — HOSPITAL ENCOUNTER (OUTPATIENT)
Facility: HOSPITAL | Age: 50
Setting detail: OUTPATIENT SURGERY
Discharge: HOME | End: 2025-02-19
Attending: SURGERY | Admitting: SURGERY
Payer: COMMERCIAL

## 2025-02-18 DIAGNOSIS — K21.9 GASTROESOPHAGEAL REFLUX DISEASE WITHOUT ESOPHAGITIS: Primary | ICD-10-CM

## 2025-02-18 LAB
ABO GROUP BLD: NORMAL
ABO GROUP BLD: NORMAL
BLD GP AB SCN SERPL QL: NEGATIVE
RH BLD: NEGATIVE
RH BLD: NEGATIVE
SPECIMEN EXPIRATION DATE: NORMAL

## 2025-02-18 PROCEDURE — 43280 LAPAROSCOPY FUNDOPLASTY: CPT | Performed by: PHYSICIAN ASSISTANT

## 2025-02-18 PROCEDURE — 86901 BLOOD TYPING SEROLOGIC RH(D): CPT | Performed by: ANESTHESIOLOGY

## 2025-02-18 PROCEDURE — 86850 RBC ANTIBODY SCREEN: CPT | Performed by: ANESTHESIOLOGY

## 2025-02-18 PROCEDURE — NC001 PR NO CHARGE: Performed by: SURGERY

## 2025-02-18 PROCEDURE — 49591 RPR AA HRN 1ST < 3 CM RDC: CPT | Performed by: PHYSICIAN ASSISTANT

## 2025-02-18 PROCEDURE — 49591 RPR AA HRN 1ST < 3 CM RDC: CPT | Performed by: SURGERY

## 2025-02-18 PROCEDURE — 43280 LAPAROSCOPY FUNDOPLASTY: CPT | Performed by: SURGERY

## 2025-02-18 PROCEDURE — S2900 ROBOTIC SURGICAL SYSTEM: HCPCS | Performed by: SURGERY

## 2025-02-18 PROCEDURE — 86900 BLOOD TYPING SEROLOGIC ABO: CPT | Performed by: ANESTHESIOLOGY

## 2025-02-18 RX ORDER — PROPOFOL 10 MG/ML
INJECTION, EMULSION INTRAVENOUS CONTINUOUS PRN
Status: DISCONTINUED | OUTPATIENT
Start: 2025-02-18 | End: 2025-02-18

## 2025-02-18 RX ORDER — ONDANSETRON 2 MG/ML
4 INJECTION INTRAMUSCULAR; INTRAVENOUS EVERY 4 HOURS PRN
Status: DISCONTINUED | OUTPATIENT
Start: 2025-02-18 | End: 2025-02-19 | Stop reason: HOSPADM

## 2025-02-18 RX ORDER — ONDANSETRON 2 MG/ML
INJECTION INTRAMUSCULAR; INTRAVENOUS AS NEEDED
Status: DISCONTINUED | OUTPATIENT
Start: 2025-02-18 | End: 2025-02-18

## 2025-02-18 RX ORDER — DEXAMETHASONE SODIUM PHOSPHATE 10 MG/ML
INJECTION, SOLUTION INTRAMUSCULAR; INTRAVENOUS AS NEEDED
Status: DISCONTINUED | OUTPATIENT
Start: 2025-02-18 | End: 2025-02-18

## 2025-02-18 RX ORDER — PROPOFOL 10 MG/ML
INJECTION, EMULSION INTRAVENOUS AS NEEDED
Status: DISCONTINUED | OUTPATIENT
Start: 2025-02-18 | End: 2025-02-18

## 2025-02-18 RX ORDER — BUPIVACAINE HYDROCHLORIDE 2.5 MG/ML
INJECTION, SOLUTION EPIDURAL; INFILTRATION; INTRACAUDAL AS NEEDED
Status: DISCONTINUED | OUTPATIENT
Start: 2025-02-18 | End: 2025-02-18 | Stop reason: HOSPADM

## 2025-02-18 RX ORDER — ONDANSETRON 2 MG/ML
4 INJECTION INTRAMUSCULAR; INTRAVENOUS ONCE AS NEEDED
Status: COMPLETED | OUTPATIENT
Start: 2025-02-18 | End: 2025-02-18

## 2025-02-18 RX ORDER — HYDROMORPHONE HCL/PF 1 MG/ML
0.5 SYRINGE (ML) INJECTION
Status: DISCONTINUED | OUTPATIENT
Start: 2025-02-18 | End: 2025-02-18 | Stop reason: HOSPADM

## 2025-02-18 RX ORDER — ACETAMINOPHEN 10 MG/ML
INJECTION, SOLUTION INTRAVENOUS AS NEEDED
Status: DISCONTINUED | OUTPATIENT
Start: 2025-02-18 | End: 2025-02-18

## 2025-02-18 RX ORDER — PROMETHAZINE HYDROCHLORIDE 25 MG/ML
12.5 INJECTION, SOLUTION INTRAMUSCULAR; INTRAVENOUS ONCE
Status: COMPLETED | OUTPATIENT
Start: 2025-02-18 | End: 2025-02-18

## 2025-02-18 RX ORDER — ENOXAPARIN SODIUM 100 MG/ML
40 INJECTION SUBCUTANEOUS EVERY 24 HOURS
Status: DISCONTINUED | OUTPATIENT
Start: 2025-02-19 | End: 2025-02-19 | Stop reason: HOSPADM

## 2025-02-18 RX ORDER — CEFAZOLIN SODIUM 2 G/50ML
2000 SOLUTION INTRAVENOUS ONCE
Status: COMPLETED | OUTPATIENT
Start: 2025-02-18 | End: 2025-02-18

## 2025-02-18 RX ORDER — AMOXICILLIN 250 MG
1 CAPSULE ORAL
Status: DISCONTINUED | OUTPATIENT
Start: 2025-02-18 | End: 2025-02-19 | Stop reason: HOSPADM

## 2025-02-18 RX ORDER — HYDROMORPHONE HCL/PF 1 MG/ML
SYRINGE (ML) INJECTION AS NEEDED
Status: DISCONTINUED | OUTPATIENT
Start: 2025-02-18 | End: 2025-02-18

## 2025-02-18 RX ORDER — LIDOCAINE HYDROCHLORIDE 10 MG/ML
INJECTION, SOLUTION EPIDURAL; INFILTRATION; INTRACAUDAL; PERINEURAL AS NEEDED
Status: DISCONTINUED | OUTPATIENT
Start: 2025-02-18 | End: 2025-02-18

## 2025-02-18 RX ORDER — SODIUM CHLORIDE, SODIUM LACTATE, POTASSIUM CHLORIDE, CALCIUM CHLORIDE 600; 310; 30; 20 MG/100ML; MG/100ML; MG/100ML; MG/100ML
INJECTION, SOLUTION INTRAVENOUS CONTINUOUS PRN
Status: DISCONTINUED | OUTPATIENT
Start: 2025-02-18 | End: 2025-02-18

## 2025-02-18 RX ORDER — FENTANYL CITRATE 50 UG/ML
INJECTION, SOLUTION INTRAMUSCULAR; INTRAVENOUS AS NEEDED
Status: DISCONTINUED | OUTPATIENT
Start: 2025-02-18 | End: 2025-02-18

## 2025-02-18 RX ORDER — KETOROLAC TROMETHAMINE 30 MG/ML
INJECTION, SOLUTION INTRAMUSCULAR; INTRAVENOUS AS NEEDED
Status: DISCONTINUED | OUTPATIENT
Start: 2025-02-18 | End: 2025-02-18

## 2025-02-18 RX ORDER — OXYCODONE HYDROCHLORIDE 5 MG/1
2.5 TABLET ORAL EVERY 4 HOURS PRN
Refills: 0 | Status: DISCONTINUED | OUTPATIENT
Start: 2025-02-18 | End: 2025-02-19 | Stop reason: HOSPADM

## 2025-02-18 RX ORDER — ONDANSETRON 2 MG/ML
4 INJECTION INTRAMUSCULAR; INTRAVENOUS EVERY 6 HOURS
Status: DISCONTINUED | OUTPATIENT
Start: 2025-02-18 | End: 2025-02-19 | Stop reason: HOSPADM

## 2025-02-18 RX ORDER — POLYETHYLENE GLYCOL 3350 17 G/17G
17 POWDER, FOR SOLUTION ORAL DAILY
Status: DISCONTINUED | OUTPATIENT
Start: 2025-02-19 | End: 2025-02-19 | Stop reason: HOSPADM

## 2025-02-18 RX ORDER — ACETAMINOPHEN 325 MG/1
975 TABLET ORAL EVERY 8 HOURS SCHEDULED
Status: DISCONTINUED | OUTPATIENT
Start: 2025-02-18 | End: 2025-02-19 | Stop reason: HOSPADM

## 2025-02-18 RX ORDER — HYDROMORPHONE HCL/PF 1 MG/ML
0.2 SYRINGE (ML) INJECTION EVERY 2 HOUR PRN
Refills: 0 | Status: DISCONTINUED | OUTPATIENT
Start: 2025-02-18 | End: 2025-02-19 | Stop reason: HOSPADM

## 2025-02-18 RX ORDER — MIDAZOLAM HYDROCHLORIDE 2 MG/2ML
INJECTION, SOLUTION INTRAMUSCULAR; INTRAVENOUS AS NEEDED
Status: DISCONTINUED | OUTPATIENT
Start: 2025-02-18 | End: 2025-02-18

## 2025-02-18 RX ORDER — PHENYLEPHRINE HCL IN 0.9% NACL 1 MG/10 ML
SYRINGE (ML) INTRAVENOUS AS NEEDED
Status: DISCONTINUED | OUTPATIENT
Start: 2025-02-18 | End: 2025-02-18

## 2025-02-18 RX ORDER — METHOCARBAMOL 750 MG/1
750 TABLET, FILM COATED ORAL EVERY 6 HOURS SCHEDULED
Status: DISCONTINUED | OUTPATIENT
Start: 2025-02-18 | End: 2025-02-19 | Stop reason: HOSPADM

## 2025-02-18 RX ORDER — METOCLOPRAMIDE HYDROCHLORIDE 5 MG/ML
10 INJECTION INTRAMUSCULAR; INTRAVENOUS ONCE AS NEEDED
Status: COMPLETED | OUTPATIENT
Start: 2025-02-18 | End: 2025-02-18

## 2025-02-18 RX ORDER — GABAPENTIN 100 MG/1
100 CAPSULE ORAL 3 TIMES DAILY
Status: DISCONTINUED | OUTPATIENT
Start: 2025-02-18 | End: 2025-02-19 | Stop reason: HOSPADM

## 2025-02-18 RX ORDER — SUCCINYLCHOLINE/SOD CL,ISO/PF 100 MG/5ML
SYRINGE (ML) INTRAVENOUS AS NEEDED
Status: DISCONTINUED | OUTPATIENT
Start: 2025-02-18 | End: 2025-02-18

## 2025-02-18 RX ORDER — SODIUM CHLORIDE 9 MG/ML
INJECTION, SOLUTION INTRAVENOUS CONTINUOUS PRN
Status: DISCONTINUED | OUTPATIENT
Start: 2025-02-18 | End: 2025-02-18

## 2025-02-18 RX ORDER — ROCURONIUM BROMIDE 10 MG/ML
INJECTION, SOLUTION INTRAVENOUS AS NEEDED
Status: DISCONTINUED | OUTPATIENT
Start: 2025-02-18 | End: 2025-02-18

## 2025-02-18 RX ORDER — OXYCODONE HYDROCHLORIDE 5 MG/1
5 TABLET ORAL EVERY 4 HOURS PRN
Refills: 0 | Status: DISCONTINUED | OUTPATIENT
Start: 2025-02-18 | End: 2025-02-19 | Stop reason: HOSPADM

## 2025-02-18 RX ORDER — PANTOPRAZOLE SODIUM 40 MG/10ML
40 INJECTION, POWDER, LYOPHILIZED, FOR SOLUTION INTRAVENOUS EVERY 12 HOURS SCHEDULED
Status: DISCONTINUED | OUTPATIENT
Start: 2025-02-18 | End: 2025-02-19 | Stop reason: HOSPADM

## 2025-02-18 RX ORDER — KETOROLAC TROMETHAMINE 30 MG/ML
30 INJECTION, SOLUTION INTRAMUSCULAR; INTRAVENOUS EVERY 6 HOURS SCHEDULED
Status: DISCONTINUED | OUTPATIENT
Start: 2025-02-18 | End: 2025-02-19 | Stop reason: HOSPADM

## 2025-02-18 RX ORDER — SODIUM CHLORIDE, SODIUM LACTATE, POTASSIUM CHLORIDE, CALCIUM CHLORIDE 600; 310; 30; 20 MG/100ML; MG/100ML; MG/100ML; MG/100ML
75 INJECTION, SOLUTION INTRAVENOUS CONTINUOUS
Status: DISCONTINUED | OUTPATIENT
Start: 2025-02-18 | End: 2025-02-19

## 2025-02-18 RX ORDER — SODIUM CHLORIDE, SODIUM LACTATE, POTASSIUM CHLORIDE, CALCIUM CHLORIDE 600; 310; 30; 20 MG/100ML; MG/100ML; MG/100ML; MG/100ML
100 INJECTION, SOLUTION INTRAVENOUS CONTINUOUS
OUTPATIENT
Start: 2025-02-18

## 2025-02-18 RX ORDER — NEBIVOLOL 5 MG/1
5 TABLET ORAL DAILY
Status: DISCONTINUED | OUTPATIENT
Start: 2025-02-18 | End: 2025-02-19 | Stop reason: HOSPADM

## 2025-02-18 RX ADMIN — PHENYLEPHRINE HYDROCHLORIDE 50 MCG/MIN: 10 INJECTION INTRAVENOUS at 08:15

## 2025-02-18 RX ADMIN — HYDROMORPHONE HYDROCHLORIDE 0.5 MG: 1 INJECTION, SOLUTION INTRAMUSCULAR; INTRAVENOUS; SUBCUTANEOUS at 10:43

## 2025-02-18 RX ADMIN — SUGAMMADEX 200 MG: 100 INJECTION, SOLUTION INTRAVENOUS at 09:40

## 2025-02-18 RX ADMIN — HYDROMORPHONE HYDROCHLORIDE 0.5 MG: 1 INJECTION, SOLUTION INTRAMUSCULAR; INTRAVENOUS; SUBCUTANEOUS at 08:50

## 2025-02-18 RX ADMIN — GABAPENTIN 100 MG: 100 CAPSULE ORAL at 14:37

## 2025-02-18 RX ADMIN — METHOCARBAMOL 750 MG: 750 TABLET ORAL at 14:37

## 2025-02-18 RX ADMIN — ACETAMINOPHEN 975 MG: 325 TABLET, FILM COATED ORAL at 14:37

## 2025-02-18 RX ADMIN — METOCLOPRAMIDE 10 MG: 5 INJECTION, SOLUTION INTRAMUSCULAR; INTRAVENOUS at 10:16

## 2025-02-18 RX ADMIN — HYDROMORPHONE HYDROCHLORIDE 0.5 MG: 1 INJECTION, SOLUTION INTRAMUSCULAR; INTRAVENOUS; SUBCUTANEOUS at 10:19

## 2025-02-18 RX ADMIN — OXYCODONE HYDROCHLORIDE 2.5 MG: 5 TABLET ORAL at 19:56

## 2025-02-18 RX ADMIN — ONDANSETRON 4 MG: 2 INJECTION INTRAMUSCULAR; INTRAVENOUS at 14:36

## 2025-02-18 RX ADMIN — MIDAZOLAM 2 MG: 1 INJECTION INTRAMUSCULAR; INTRAVENOUS at 07:31

## 2025-02-18 RX ADMIN — SENNOSIDES AND DOCUSATE SODIUM 1 TABLET: 50; 8.6 TABLET ORAL at 21:39

## 2025-02-18 RX ADMIN — PROMETHAZINE HYDROCHLORIDE 12.5 MG: 25 INJECTION INTRAMUSCULAR; INTRAVENOUS at 10:34

## 2025-02-18 RX ADMIN — METHOCARBAMOL 750 MG: 750 TABLET ORAL at 22:17

## 2025-02-18 RX ADMIN — Medication 100 MG: at 07:38

## 2025-02-18 RX ADMIN — ROCURONIUM BROMIDE 10 MG: 10 INJECTION, SOLUTION INTRAVENOUS at 07:38

## 2025-02-18 RX ADMIN — SODIUM CHLORIDE: 0.9 INJECTION, SOLUTION INTRAVENOUS at 07:40

## 2025-02-18 RX ADMIN — ACETAMINOPHEN 975 MG: 325 TABLET, FILM COATED ORAL at 21:39

## 2025-02-18 RX ADMIN — HYDROMORPHONE HYDROCHLORIDE 0.5 MG: 1 INJECTION, SOLUTION INTRAMUSCULAR; INTRAVENOUS; SUBCUTANEOUS at 12:24

## 2025-02-18 RX ADMIN — SODIUM CHLORIDE, SODIUM LACTATE, POTASSIUM CHLORIDE, AND CALCIUM CHLORIDE 75 ML/HR: .6; .31; .03; .02 INJECTION, SOLUTION INTRAVENOUS at 13:30

## 2025-02-18 RX ADMIN — CEFAZOLIN SODIUM 2000 MG: 2 SOLUTION INTRAVENOUS at 07:51

## 2025-02-18 RX ADMIN — DEXAMETHASONE SODIUM PHOSPHATE 10 MG: 10 INJECTION, SOLUTION INTRAMUSCULAR; INTRAVENOUS at 07:55

## 2025-02-18 RX ADMIN — ONDANSETRON 4 MG: 2 INJECTION INTRAMUSCULAR; INTRAVENOUS at 07:55

## 2025-02-18 RX ADMIN — NEBIVOLOL 5 MG: 5 TABLET ORAL at 17:55

## 2025-02-18 RX ADMIN — KETOROLAC TROMETHAMINE 30 MG: 30 INJECTION, SOLUTION INTRAMUSCULAR; INTRAVENOUS at 17:31

## 2025-02-18 RX ADMIN — ACETAMINOPHEN 1000 MG: 10 INJECTION, SOLUTION INTRAVENOUS at 09:35

## 2025-02-18 RX ADMIN — ONDANSETRON 4 MG: 2 INJECTION INTRAMUSCULAR; INTRAVENOUS at 21:38

## 2025-02-18 RX ADMIN — FENTANYL CITRATE 50 MCG: 50 INJECTION INTRAMUSCULAR; INTRAVENOUS at 07:57

## 2025-02-18 RX ADMIN — SODIUM CHLORIDE, SODIUM LACTATE, POTASSIUM CHLORIDE, AND CALCIUM CHLORIDE: .6; .31; .03; .02 INJECTION, SOLUTION INTRAVENOUS at 07:19

## 2025-02-18 RX ADMIN — ROCURONIUM BROMIDE 40 MG: 10 INJECTION, SOLUTION INTRAVENOUS at 07:49

## 2025-02-18 RX ADMIN — ONDANSETRON 4 MG: 2 INJECTION INTRAMUSCULAR; INTRAVENOUS at 10:09

## 2025-02-18 RX ADMIN — LIDOCAINE HYDROCHLORIDE 50 MG: 10 INJECTION, SOLUTION EPIDURAL; INFILTRATION; INTRACAUDAL; PERINEURAL at 07:38

## 2025-02-18 RX ADMIN — GABAPENTIN 100 MG: 100 CAPSULE ORAL at 21:39

## 2025-02-18 RX ADMIN — PROPOFOL 150 MG: 10 INJECTION, EMULSION INTRAVENOUS at 07:38

## 2025-02-18 RX ADMIN — ROCURONIUM BROMIDE 10 MG: 10 INJECTION, SOLUTION INTRAVENOUS at 08:41

## 2025-02-18 RX ADMIN — FENTANYL CITRATE 50 MCG: 50 INJECTION INTRAMUSCULAR; INTRAVENOUS at 07:38

## 2025-02-18 RX ADMIN — PANTOPRAZOLE SODIUM 40 MG: 40 INJECTION, POWDER, FOR SOLUTION INTRAVENOUS at 17:31

## 2025-02-18 RX ADMIN — ROCURONIUM BROMIDE 10 MG: 10 INJECTION, SOLUTION INTRAVENOUS at 09:12

## 2025-02-18 RX ADMIN — KETOROLAC TROMETHAMINE 15 MG: 30 INJECTION, SOLUTION INTRAMUSCULAR; INTRAVENOUS at 09:34

## 2025-02-18 RX ADMIN — PROPOFOL 80 MCG/KG/MIN: 10 INJECTION, EMULSION INTRAVENOUS at 07:40

## 2025-02-18 RX ADMIN — Medication 200 MCG: at 08:10

## 2025-02-18 NOTE — PROGRESS NOTES
Progress Note - Surgery-General   Name: Arpan Hayes 49 y.o. male I MRN: [id number]  Unit/Bed#: [facility] 809-01 I Date of Admission: 2/18/2025   Date of Service: 2/18/2025 I Hospital Day: 0    Assessment & Plan  Gastroesophageal reflux disease without esophagitis  50 yo M s/p robotic PEHR and UHR    -adv to FLD  -continue BID ppi  -anti-emetics  -analgesia and anti-emetics  -dvt ppx  -oob and ambulation TID  -encourage IS use  -dispo planning  -will d/c on FLD for 3 more days and then transition to soft diet          Subjective   No acute events overnight, andrew CLD, no dysphagia or hoarseness. Some mild bilateral shoulder pain improving from day prior.    Objective :  Temp:  [96.7 °F (35.9 °C)-98.7 °F (37.1 °C)] 98.7 °F (37.1 °C)  HR:  [76-92] 87  BP: (123-143)/(62-78) 143/78  Resp:  [14-21] 16  SpO2:  [95 %-100 %] 99 %  O2 Device: None (Room air)    I/O         02/16 0701  02/17 0700 02/17 0701  02/18 0700 02/18 0701  02/19 0700    I.V. (mL/kg)   1350 (19.3)    Total Intake(mL/kg)   1350 (19.3)    Urine (mL/kg/hr)   0 (0)    Stool   0    Total Output   0    Net   +1350           Unmeasured Urine Occurrence   1 x            Physical Exam  Constitutional:       General: He is not in acute distress.     Appearance: Normal appearance. He is not toxic-appearing.   HENT:      Head: Normocephalic.      Mouth/Throat:      Mouth: Mucous membranes are moist.   Eyes:      Extraocular Movements: Extraocular movements intact.      Pupils: Pupils are equal, round, and reactive to light.   Cardiovascular:      Rate and Rhythm: Normal rate.   Pulmonary:      Effort: Pulmonary effort is normal.   Abdominal:      General: There is no distension.      Palpations: Abdomen is soft. There is no mass.      Tenderness: There is abdominal tenderness. There is no guarding or rebound.      Comments: Post surgical tenderness, incisions c/d/i   Skin:     General: Skin is warm.   Neurological:      General: No focal deficit present.      Mental  "Status: He is alert and oriented to person, place, and time.           Lab Results: I have reviewed the following results:  No results for input(s): \"WBC\", \"HGB\", \"HCT\", \"PLT\", \"BANDSPCT\", \"SODIUM\", \"K\", \"CL\", \"CO2\", \"BUN\", \"CREATININE\", \"GLUC\", \"CAIONIZED\", \"MG\", \"PHOS\", \"AST\", \"ALT\", \"ALB\", \"TBILI\", \"DBILI\", \"ALKPHOS\", \"PTT\", \"INR\", \"HSTNI0\", \"HSTNI2\", \"BNP\", \"LACTICACID\" in the last 72 hours.          VTE Pharmacologic Prophylaxis: VTE covered by:  [START ON 2/19/2025] enoxaparin, Subcutaneous     VTE Mechanical Prophylaxis: sequential compression device  "

## 2025-02-18 NOTE — ASSESSMENT & PLAN NOTE
48 yo M s/p robotic PEHR and UHR    -adv to FLD  -continue BID ppi  -anti-emetics  -analgesia and anti-emetics  -dvt ppx  -oob and ambulation TID  -encourage IS use  -dispo planning  -will d/c on FLD for 3 more days and then transition to soft diet

## 2025-02-18 NOTE — QUICK NOTE
Post Op Check:    Patient is resting comfortably with family at bedside. Able to tolerate CLD without issues, no s/s dysphagia or hoarseness. Some mild per incisional discomfort and bilateral trapezius soreness.     Temp:  [96.7 °F (35.9 °C)-98.7 °F (37.1 °C)] 98.7 °F (37.1 °C)  HR:  [76-92] 91  BP: (123-136)/(62-72) 132/70  Resp:  [14-21] 16  SpO2:  [95 %-100 %] 97 %  O2 Device: None (Room air)      Physical Exam:  General: No acute distress, alert and oriented  CV: Well perfused, regular rate  Lungs: Normal work of breathing, no increased respiratory effort  Abdomen: Soft, appropriately tender, non-distended. Incision(s) clean, dry and intact.  Extremities: No edema, clubbing or cyanosis  Skin: Warm, dry    Pablito Lora MD  PGY-2   02/18/25

## 2025-02-18 NOTE — DISCHARGE INSTR - AVS FIRST PAGE
Post-Operative Care Instructions             Dr. James Ferguson M.D.    1. General: You will feel pulling sensations around the wound and/or aches and pains around the incisions. This is normal. Even minor surgery is a change in your body and this is your body’s way of reaction to it. If you have had abdominal surgery, it may help to support the incision with a small pillow or blanket for comfort when moving or coughing.    2. Wound care:    Bandage/Dressing - Make sure to remove the bandage in about 48 hours, unless instructed otherwise. You usually don't have to redress the wound after 24-48 hours, unless for comfort. Keep the incision clean and dry. Let air get to it. If the Steri-Strips fall off, just keep the wound clean.     Glue - Leave glue alone, it will fall off on its own, no need for an additional dressings    3. Water: You may shower over the wound, unless there are drain tubes left in place. Do not bathe or use a pool or hot tub until cleared by the physician. You may shower right over the staples, glue or Steri-Strips and rinse wound with soapy water but do not scrub incision pat dry when you are done.    4. Activity: You may go up and down stairs, walk as much as you are comfortable, but walk at least 3 times each day. If you have had abdominal or hernia surgery, do not lift anything heavier than 15 pounds for at least 4 weeks.    5. Diet: You may resume a regular diet. If you had a same-day surgery or overnight stay surgery, you may wish to eat lightly for a few days: soups, crackers, and sandwiches. You may resume a regular diet when ready.    6. Medications: Resume all of your previous medications, unless told otherwise by the doctor. Tylenol and ibuoprofen is always fine, unless you are taking any narcotic pain medication containing Tylenol (such as Percocet, Darvocet, Vicodin, or anything containing acetaminophen). Do not take Tylenol if you're taking these medications. You do not need to take  the narcotic pain medications unless you are having significant pain and discomfort.    7. Driving: He will need someone to drive you home on the day of surgery. Do not drive or make any important decisions while on narcotic pain medication or 24 hours and after anesthesia or sedation for surgery. Generally, you may drive when your off all narcotic pain medications, and you can turn in your seat comfortably to check your blind spot.     8. Upset Stomach: You may take Maalox, Tums, or similar items for an upset stomach. If your narcotic pain medication causes an upset stomach, do not take it on an empty stomach. Try taking it with at least some crackers or toast.     9. Constipation: Patients often experienced constipation after surgery. You may take over-the-counter medication for this, such as Metamucil, Senokot, Dulcolax, milk of magnesia, etc. You may take a suppository unless you have had anorectal surgery such as a procedure on your hemorrhoids. If you experience significant nausea or vomiting after abdominal surgery, call the office before trying any of these medications.    10. Call the office: If you are experiencing any of the following, fevers above 101.5°, significant nausea or vomiting, if the wound develops drainage and/or is excessive redness around the wound, or if you have significant diarrhea or other worsening symptoms.    11. Pain: You may be given a prescription for pain. This will be given to the hospital, the day of surgery.    12. Sexual Activity: You may resume sexual activity when you feel ready and comfortable and your incision is sealed and healed without apparent infection risk.    [facility]  Phone: [phone]      Please continue on a full liquid diet until 2/23/2025, which time you may commence the diet as below.    Post-op Foregut Diet    · Foods should be very soft consistency; semi solid or fork tender.    · Chew foods very well before swallowing; allow  20-30 minutes per meal.    · Eat every 3-4 hours, have 3 small meals and 2-3 small snacks    · Follow this diet for at least 3 weeks after surgery or until otherwise directed by your surgeon.    Acceptable Foods     · Eggs or egg beaters  · Greek yogurt  · Cottage cheese  · Cheese sticks or shredded cheese  · Soft fish (salmon, white fish, tuna)  · Beans, lentils, or lentil soup  · Tuna or egg salad  · Refried beans  · Protein shakes  · Loosely cooked/crumbled ground turkey, chicken, or beef  · Thinned oatmeal or cream of wheat  · Over cooked, soft/mushy vegetables  · Fruits canned in natural juices  · Pureed food  · Sweet potatoes, well cooked, no skin  · Winter squash, well cooked, no skin  · Applesauce  · Tomato, carrot gamal or butternut squash soup ·       Foods to Avoid  Solid/Tough meats: chicken, steak, hamburgers, hot dogs, pork chops, meatloaf, etc.   · Bread, rice, pasta, macaroni and cheese   · High-fat foods   · High-sugar foods   · Raw vegetables   · Junk food   · Crunchy foods (chips, popcorn, nuts, pumpkin and sunflower seeds)

## 2025-02-18 NOTE — H&P
H&P - Surgery-General   Name: Arpan Hayes 49 y.o. male I MRN: [id number]  Unit/Bed#: OR POOL I Date of Admission: 2/18/2025   Date of Service: 2/18/2025 I Hospital Day: 0     Assessment & Plan  Gastroesophageal reflux disease without esophagitis      Will plan for robotic paraesophageal hernia repair with partial fundoplication and EGD.  Patient is amenable to risks and benefits, no proceed back to the operating room expeditiously.    History of Present Illness   Arpan Hayes is a 49 y.o. male who presents with medically refractory GERD in the setting of a hiatal hernia.  He has had significant reflux with bad regurgitant symptoms.  No significant dysphagia, or odynophagia is tolerating a diet, moving his bowels normally.    Review of Systems   Constitutional:  Negative for appetite change, chills, diaphoresis and fever.   HENT:  Negative for nosebleeds and trouble swallowing.    Eyes: Negative.    Respiratory:  Negative for cough, shortness of breath and wheezing.    Cardiovascular:  Negative for chest pain, palpitations and leg swelling.   Gastrointestinal:  Negative for abdominal distention, abdominal pain, nausea and vomiting.   Genitourinary:  Negative for difficulty urinating, flank pain and frequency.   Musculoskeletal:  Negative for arthralgias, joint swelling and myalgias.   Skin:  Negative for pallor and rash.   Neurological:  Negative for dizziness, facial asymmetry and speech difficulty.   Hematological:  Does not bruise/bleed easily.   Psychiatric/Behavioral:  Negative for agitation and confusion.    All other systems reviewed and are negative.    Historical Information   Past Medical History:   Diagnosis Date    GERD (gastroesophageal reflux disease)     PONV (postoperative nausea and vomiting)     PVC (premature ventricular contraction) resolved    Tenosynovitis of wrist      Past Surgical History:   Procedure Laterality Date    COLONOSCOPY      ESOPHAGOGASTRODUODENOSCOPY N/A 10/20/2016     Procedure: ESOPHAGOGASTRODUODENOSCOPY (EGD);  Surgeon: Philip Turcios MD;  Location: [facility];  Service:     FEMUR SURGERY      Femur repair    VASECTOMY       Social History     Tobacco Use    Smoking status: Never    Smokeless tobacco: Never   Vaping Use    Vaping status: Never Used   Substance and Sexual Activity    Alcohol use: Yes     Comment: occasional wine    Drug use: Never    Sexual activity: Not on file     E-Cigarette/Vaping    E-Cigarette Use Never User      E-Cigarette/Vaping Substances     Family History   Problem Relation Age of Onset    Leukemia Mother     Corrales's esophagus Father     Coronary artery disease Father     Diabetes Father     Heart disease Father     Coronary artery disease Maternal Grandfather     Substance Abuse Neg Hx     Mental illness Neg Hx      Social History     Tobacco Use    Smoking status: Never    Smokeless tobacco: Never   Vaping Use    Vaping status: Never Used   Substance and Sexual Activity    Alcohol use: Yes     Comment: occasional wine    Drug use: Never    Sexual activity: Not on file     No current facility-administered medications for this encounter.  Prior to Admission Medications   Prescriptions Last Dose Informant Patient Reported? Taking?   Calcium Carb-Cholecalciferol (CALCIUM 600 + D PO) Past Week Self Yes Yes   Sig: Take by mouth 1 daily   Multiple Vitamin (MULTIVITAMIN) tablet Past Week Self Yes Yes   Sig: Take 1 tablet by mouth daily.   nebivolol (BYSTOLIC) 5 mg tablet 2/17/2025 at  5:00 PM  No Yes   Sig: Take 1 tablet (5 mg total) by mouth daily   omeprazole (PriLOSEC) 40 MG capsule 2/17/2025 at  5:00 PM Self No Yes   Sig: TAKE 1 CAPSULE EVERY DAY      Facility-Administered Medications: None     Amoxicillin-pot clavulanate    Objective :  Temp:  [96.7 °F (35.9 °C)] 96.7 °F (35.9 °C)  HR:  [76] 76  BP: (129)/(72) 129/72  SpO2:  [100 %] 100 %  O2 Device: None (Room air)      Physical Exam  Vitals and nursing note reviewed.   Constitutional:       General:  "He is not in acute distress.     Appearance: Normal appearance. He is not ill-appearing.   HENT:      Head: Normocephalic and atraumatic.      Mouth/Throat:      Mouth: Mucous membranes are moist.      Pharynx: Oropharynx is clear.   Eyes:      Extraocular Movements: Extraocular movements intact.   Cardiovascular:      Rate and Rhythm: Normal rate and regular rhythm.   Pulmonary:      Effort: Pulmonary effort is normal. No respiratory distress.      Breath sounds: No stridor. No wheezing.   Abdominal:      General: There is no distension.      Palpations: Abdomen is soft. There is no mass.      Tenderness: There is no abdominal tenderness.      Hernia: No hernia is present.   Musculoskeletal:         General: No swelling or tenderness. Normal range of motion.      Cervical back: Neck supple.   Skin:     General: Skin is warm and dry.   Neurological:      General: No focal deficit present.      Mental Status: He is alert and oriented to person, place, and time. Mental status is at baseline.   Psychiatric:         Mood and Affect: Mood normal.         Behavior: Behavior normal.         Lab Results: I have reviewed the following results:  No results for input(s): \"WBC\", \"HGB\", \"HCT\", \"PLT\", \"BANDSPCT\", \"SODIUM\", \"K\", \"CL\", \"CO2\", \"BUN\", \"CREATININE\", \"GLUC\", \"CAIONIZED\", \"MG\", \"PHOS\", \"AST\", \"ALT\", \"ALB\", \"TBILI\", \"DBILI\", \"ALKPHOS\", \"PTT\", \"INR\", \"HSTNI0\", \"HSTNI2\", \"BNP\", \"LACTICACID\" in the last 72 hours.    Imaging Results Review: No pertinent imaging studies reviewed.  Other Study Results Review: No additional pertinent studies reviewed.    VTE Pharmacologic Prophylaxis: Sequential compression device (Venodyne)   VTE Mechanical Prophylaxis: sequential compression device      "

## 2025-02-18 NOTE — OP NOTE
OPERATIVE REPORT  PATIENT NAME: Arpan Hayes    :  1975  MRN: [id number]  Pt Location:  OR ROOM 15    SURGERY DATE: 2025    Surgeons and Role:     * James Ferguson MD - Primary     * Paty Navarrete PA-C - Assisting     * Pablito Lora MD -observing    Preop Diagnosis:  Paraesophageal hernia [K44.9]    Post-Op Diagnosis Codes:     * Paraesophageal hernia [K44.9]    Procedure(s):  Robotic paraesophageal hernia repair w/ fundoplication. Posterior gastropexy. Umbilical hernia repair  ESOPHAGOGASTRODUODENOSCOPY (EGD)    Specimen(s):  * No specimens in log *    Estimated Blood Loss:   Minimal    Drains:  NG/OG/Enteral Tube Orogastric 18 Fr Center mouth (Active)   Number of days: 0       Anesthesia Type:   General    Operative Indications:  Paraesophageal hernia [K44.9]  49-year-old female with medically refractory reflux and regurgitation in the setting of a hiatal hernia.  After appropriate preoperative workup and a discussion of risks and benefits, he opted to proceed to the operating room for planned robotic paraesophageal hernia repair with partial fundoplication and EGD.    Operative Findings:  3 cm type I hiatal hernia.  At the completion of the dissection there was 5 cm of intra-abdominal esophageal length, with completion endoscopy showing an intact wrap and no other pathology.    1 x 1 cm fat-containing umbilical hernia.  This was measured prior to reduction of preperitoneal fat.  This was closed with 0 Ethibond on the suture passer in a figure-of-eight fashion.      Complications:   None    Procedure and Technique:  The patient was correctly identified by name and medical record number in the holding area and brought to the operative suite, where he was placed supine on the operative table. SCDs were placed and he was given preoperative antibiotics within 1 hour of incision.    The patient was prepped and draped in the usual fashion. A timeout was performed.  Stab incision was made in the left  upper quadrant and a Veress needle was introduced.  The abdomen was insufflated with CO2 to a pressure of 15mmHg. The patient tolerated insufflation without complication.  An 8 mm robotic trocar was placed in the supraumbilical location.  The abdomen was inspected and Veress needle was removed.  Additional robotic trocars were placed in the left midclavicular line, left flank, and right midclavicular line.  An 8 mm assistant trocar was placed in the right lower quadrant.  Finally a right flank 5 mm port was placed for the liver tractor.  The patient was placed in steep reverse Trendelenburg.    Once all of the ports were placed, the liver was retracted with the liver retractor and the hernia was identified and reduced using downward gentle retraction.     At this point the intuitive surgical [employer] surgical robot was docked, and appropriate instrumentation was introduced under direct visualization.     The pars flaccida was entered using the vessel sealer and dissected up to the right adrianna. The hernia sac was then opened, exposing the plane between the hernia sac and the mediastinum. The sac was sharply and bluntly dissected from the pleura laterally on both sides, the aorta posteriorly and the pericardium anteriorly with the vessel sealer.     After the hernia sac was dissected, the esophagus was mobilized first by dissecting out the right adrianna and taking down the phrenoesophageal membrane, making sure to preserve the peritoneal lining of the right adrianna. Circumferential mobilization of the esophagus was performed to help establish adequate intra-abdominal esophageal length of 2-3cm. The left adrianna and base of the crura were also dissected during the mobilization of the esophagus.    Attention was then turned to the mobilization of the fundus and the division of the short gastric vessels. With the traction of gastrosplenic omentum and countertraction of the stomach, the short gastric vessels were ligated with  "the vessel sealer along the upper third of the greater curve of the stomach. Care was taken as the superior pole of the spleen was approached to avoid injury. Retrogastric attachments were taken down and a window was created into the lesser sac. Exposure of the left adrianna was achieved with further dissection of the short gastric and retroesophageal attachments.    Attention was then turned to performing a tension free hiatal closure. The crura were reapproximated with 3 interrupted sutures of #0 Ethibond. At the completion of the hiatal closure, a grasper was easily passed through the hiatus and the closure did not appear to be too tight.    Attention was then turned to creating a Toupet wrap. A grasper was passed through the retroesophageal window and the fundus was grasped at the level of the divided short gastric vessels along the greater curvature and pulled through the window to the right side. A \"shoe-shine\" maneuver was then performed to ensure adequate mobility without twists or tension. The Toupet was then secured with #2-0 Ethibond. Each stitch incorporated full thickness of the stomach and partial thickness of the esophagus, spaced 1cm apart on both the left and right side of the esophagus.  A posterior gastropexy stitch was then placed from the posterior fundoplication to the crural repair using 0 Ethibond suture.    We then performed an upper endoscopy.  An adult endoscope was placed the oropharynx down the esophagus into the stomach.  There was minimal resistance going through the GE junction, and the scope was visualized intra-abdominally to be at the GE junction.  There was no obvious pathology in the abdomen the scope was class into the 1st portion of the duodenum with bile noted.  On retroflexion in the abdomen the wrap looked intact and secured.  The robot was then undocked.    We then noticed a small fat-containing umbilical hernia just below the periumbilical port site.  We measured this at 1 x 1 " cm.  Laparoscopically we scored the peritoneum with hot scissors and noticed the herniated preperitoneal fat.  This was reduced.  We then closed the fascia with 0 Ethibond suture on a suture passer through the supraumbilical port site.  This was done in a figure-of-eight fashion.    After ensuring hemostasis, all ports were removed under direct visualization and no bleeding was noted. All skin incisions were closed with subcuticular #4-0 Monocryl. The wounds were dressed with glue.    At the end of the procedure, all instrument and sponge counts were correct x2.    The patient was brought to PACU in stable condition.      I was present for the entire procedure., A qualified resident physician was not available., and A physician assistant was required during the procedure for retraction, tissue handling, dissection and suturing.    Patient Disposition:  PACU  and hemodynamically stable             SIGNATURE: James Ferguson MD  DATE: February 18, 2025  TIME: 9:44 AM

## 2025-02-18 NOTE — ANESTHESIA POSTPROCEDURE EVALUATION
Post-Op Assessment Note    CV Status:  Stable  Pain Score: 0    Pain management: adequate       Mental Status:  Alert and awake   Hydration Status:  Euvolemic   PONV Controlled:  Controlled   Airway Patency:  Patent     Post Op Vitals Reviewed: Yes    No anethesia notable event occurred.    Staff: Anesthesiologist, CRNA           Last Filed PACU Vitals:  Vitals Value Taken Time   Temp 98    Pulse 91 02/18/25 1003   /62    Resp 24 02/18/25 1003   SpO2 99 % 02/18/25 1003   Vitals shown include unfiled device data.

## 2025-02-18 NOTE — PLAN OF CARE
Problem: PAIN - ADULT  Goal: Verbalizes/displays adequate comfort level or baseline comfort level  Description: Interventions:  - Encourage patient to monitor pain and request assistance  - Assess pain using appropriate pain scale  - Administer analgesics based on type and severity of pain and evaluate response  - Implement non-pharmacological measures as appropriate and evaluate response  - Consider cultural and social influences on pain and pain management  - Notify physician/advanced practitioner if interventions unsuccessful or patient reports new pain  Outcome: Progressing     Problem: INFECTION - ADULT  Goal: Absence or prevention of progression during hospitalization  Description: INTERVENTIONS:  - Assess and monitor for signs and symptoms of infection  - Monitor lab/diagnostic results  - Monitor all insertion sites, i.e. indwelling lines, tubes, and drains  - Monitor endotracheal if appropriate and nasal secretions for changes in amount and color  - [place] appropriate cooling/warming therapies per order  - Administer medications as ordered  - Instruct and encourage patient and family to use good hand hygiene technique  - Identify and instruct in appropriate isolation precautions for identified infection/condition  Outcome: Progressing  Goal: Absence of fever/infection during neutropenic period  Description: INTERVENTIONS:  - Monitor WBC    Outcome: Progressing     Problem: SAFETY ADULT  Goal: Patient will remain free of falls  Description: INTERVENTIONS:  - Educate patient/family on patient safety including physical limitations  - Instruct patient to call for assistance with activity   - Consult OT/PT to assist with strengthening/mobility   - Keep Call bell within reach  - Keep bed low and locked with side rails adjusted as appropriate  - Keep care items and personal belongings within reach  - Initiate and maintain comfort rounds  - Make Fall Risk Sign visible to staff    - Apply yellow socks and  bracelet for high fall risk patients  - Consider moving patient to room near nurses station  Outcome: Progressing  Goal: Maintain or return to baseline ADL function  Description: INTERVENTIONS:  -  Assess patient's ability to carry out ADLs; assess patient's baseline for ADL function and identify physical deficits which impact ability to perform ADLs (bathing, care of mouth/teeth, toileting, grooming, dressing, etc.)  - Assess/evaluate cause of self-care deficits   - Assess range of motion  - Assess patient's mobility; develop plan if impaired  - Assess patient's need for assistive devices and provide as appropriate  - Encourage maximum independence but intervene and supervise when necessary  - Involve family in performance of ADLs  - Assess for home care needs following discharge   - Consider OT consult to assist with ADL evaluation and planning for discharge  - Provide patient education as appropriate  Outcome: Progressing  Goal: Maintains/Returns to pre admission functional level  Description: INTERVENTIONS:  - Perform AM-PAC 6 Click Basic Mobility/ Daily Activity assessment daily.  - Set and communicate daily mobility goal to care team and patient/family/caregiver.   - Collaborate with rehabilitation services on mobility goals if consulted  - Perform Range of Motion 3 times a day.  - Reposition patient every 2 hours.  - Dangle patient 3 times a day  - Stand patient 3 times a day  - Ambulate patient 3 times a day  - Out of bed to chair 3 times a day   - Out of bed for meals 3 times a day  - Out of bed for toileting  - Record patient progress and toleration of activity level   Outcome: Progressing     Problem: DISCHARGE PLANNING  Goal: Discharge to home or other facility with appropriate resources  Description: INTERVENTIONS:  - Identify barriers to discharge w/patient and caregiver  - Arrange for needed discharge resources and transportation as appropriate  - Identify discharge learning needs (meds, wound care,  etc.)  - Arrange for interpretive services to assist at discharge as needed  - Refer to Case Management Department for coordinating discharge planning if the patient needs post-hospital services based on physician/advanced practitioner order or complex needs related to functional status, cognitive ability, or social support system  Outcome: Progressing     Problem: Knowledge Deficit  Goal: Patient/family/caregiver demonstrates understanding of disease process, treatment plan, medications, and discharge instructions  Description: Complete learning assessment and assess knowledge base.  Interventions:  - Provide teaching at level of understanding  - Provide teaching via preferred learning methods  Outcome: Progressing

## 2025-02-19 VITALS
OXYGEN SATURATION: 98 % | TEMPERATURE: 98.2 F | DIASTOLIC BLOOD PRESSURE: 76 MMHG | HEART RATE: 71 BPM | SYSTOLIC BLOOD PRESSURE: 135 MMHG | RESPIRATION RATE: 17 BRPM | WEIGHT: 154 LBS | BODY MASS INDEX: 24.17 KG/M2 | HEIGHT: 67 IN

## 2025-02-19 PROCEDURE — 99024 POSTOP FOLLOW-UP VISIT: CPT | Performed by: NURSE PRACTITIONER

## 2025-02-19 PROCEDURE — NC001 PR NO CHARGE: Performed by: SURGERY

## 2025-02-19 RX ORDER — METHOCARBAMOL 750 MG/1
750 TABLET, FILM COATED ORAL EVERY 6 HOURS SCHEDULED
Qty: 28 TABLET | Refills: 0 | Status: SHIPPED | OUTPATIENT
Start: 2025-02-19 | End: 2025-02-26

## 2025-02-19 RX ORDER — PANTOPRAZOLE SODIUM 40 MG/1
40 TABLET, DELAYED RELEASE ORAL 2 TIMES DAILY
Qty: 60 TABLET | Refills: 0 | Status: SHIPPED | OUTPATIENT
Start: 2025-02-19 | End: 2025-03-21

## 2025-02-19 RX ORDER — ONDANSETRON 4 MG/1
4 TABLET, ORALLY DISINTEGRATING ORAL EVERY 6 HOURS PRN
Qty: 28 TABLET | Refills: 0 | Status: SHIPPED | OUTPATIENT
Start: 2025-02-19 | End: 2025-02-26

## 2025-02-19 RX ORDER — GABAPENTIN 100 MG/1
100 CAPSULE ORAL 3 TIMES DAILY
Qty: 21 CAPSULE | Refills: 0 | Status: SHIPPED | OUTPATIENT
Start: 2025-02-19 | End: 2025-02-26

## 2025-02-19 RX ORDER — OXYCODONE HYDROCHLORIDE 5 MG/1
5 TABLET ORAL EVERY 6 HOURS PRN
Qty: 12 TABLET | Refills: 0 | Status: SHIPPED | OUTPATIENT
Start: 2025-02-19 | End: 2025-02-22

## 2025-02-19 RX ADMIN — GABAPENTIN 100 MG: 100 CAPSULE ORAL at 09:05

## 2025-02-19 RX ADMIN — ACETAMINOPHEN 975 MG: 325 TABLET, FILM COATED ORAL at 05:06

## 2025-02-19 RX ADMIN — NEBIVOLOL 5 MG: 5 TABLET ORAL at 09:06

## 2025-02-19 RX ADMIN — KETOROLAC TROMETHAMINE 30 MG: 30 INJECTION, SOLUTION INTRAMUSCULAR; INTRAVENOUS at 05:06

## 2025-02-19 RX ADMIN — KETOROLAC TROMETHAMINE 30 MG: 30 INJECTION, SOLUTION INTRAMUSCULAR; INTRAVENOUS at 00:13

## 2025-02-19 RX ADMIN — ONDANSETRON 4 MG: 2 INJECTION INTRAMUSCULAR; INTRAVENOUS at 05:05

## 2025-02-19 RX ADMIN — PANTOPRAZOLE SODIUM 40 MG: 40 INJECTION, POWDER, FOR SOLUTION INTRAVENOUS at 09:06

## 2025-02-19 RX ADMIN — SODIUM CHLORIDE, SODIUM LACTATE, POTASSIUM CHLORIDE, AND CALCIUM CHLORIDE 75 ML/HR: .6; .31; .03; .02 INJECTION, SOLUTION INTRAVENOUS at 00:14

## 2025-02-19 RX ADMIN — METHOCARBAMOL 750 MG: 750 TABLET ORAL at 05:06

## 2025-02-19 NOTE — PLAN OF CARE
Problem: PAIN - ADULT  Goal: Verbalizes/displays adequate comfort level or baseline comfort level  Description: Interventions:  - Encourage patient to monitor pain and request assistance  - Assess pain using appropriate pain scale  - Administer analgesics based on type and severity of pain and evaluate response  - Implement non-pharmacological measures as appropriate and evaluate response  - Consider cultural and social influences on pain and pain management  - Notify physician/advanced practitioner if interventions unsuccessful or patient reports new pain  Outcome: Progressing     Problem: SAFETY ADULT  Goal: Patient will remain free of falls  Description: INTERVENTIONS:  - Educate patient/family on patient safety including physical limitations  - Instruct patient to call for assistance with activity   - Consult OT/PT to assist with strengthening/mobility   - Keep Call bell within reach  - Keep bed low and locked with side rails adjusted as appropriate  - Keep care items and personal belongings within reach  - Initiate and maintain comfort rounds  - Make Fall Risk Sign visible to staff  - Initiate/Maintain alarm  - Obtain necessary fall risk management equipment  - Apply yellow socks and bracelet for high fall risk patients  - Consider moving patient to room near nurses station  Outcome: Progressing     Problem: INFECTION - ADULT  Goal: Absence or prevention of progression during hospitalization  Description: INTERVENTIONS:  - Assess and monitor for signs and symptoms of infection  - Monitor lab/diagnostic results  - Monitor all insertion sites, i.e. indwelling lines, tubes, and drains  - Monitor endotracheal if appropriate and nasal secretions for changes in amount and color  - [place] appropriate cooling/warming therapies per order  - Administer medications as ordered  - Instruct and encourage patient and family to use good hand hygiene technique  - Identify and instruct in appropriate isolation precautions  for identified infection/condition  Outcome: Progressing     Problem: SKIN/TISSUE INTEGRITY - ADULT  Goal: Incision(s), wounds(s) or drain site(s) healing without S/S of infection  Description: INTERVENTIONS  - Assess and document dressing, incision, wound bed, drain sites and surrounding tissue  - Provide patient and family education  - Perform skin care/dressing changes q shift and PRN  Outcome: Progressing     Problem: HEMATOLOGIC - ADULT  Goal: Maintains hematologic stability  Description: INTERVENTIONS  - Assess for signs and symptoms of bleeding or hemorrhage  - Monitor labs  - Administer supportive blood products/factors as ordered and appropriate  Outcome: Progressing     Problem: MUSCULOSKELETAL - ADULT  Goal: Maintain or return mobility to safest level of function  Description: INTERVENTIONS:  - Assess patient's ability to carry out ADLs; assess patient's baseline for ADL function and identify physical deficits which impact ability to perform ADLs (bathing, care of mouth/teeth, toileting, grooming, dressing, etc.)  - Assess/evaluate cause of self-care deficits   - Assess range of motion  - Assess patient's mobility  - Assess patient's need for assistive devices and provide as appropriate  - Encourage maximum independence but intervene and supervise when necessary  - Involve family in performance of ADLs  - Assess for home care needs following discharge   - Consider OT consult to assist with ADL evaluation and planning for discharge  - Provide patient education as appropriate  Outcome: Progressing     Problem: MUSCULOSKELETAL - ADULT  Goal: Maintain proper alignment of affected body part  Description: INTERVENTIONS:  - Support, maintain and protect limb and body alignment  - Provide patient/ family with appropriate education  Outcome: Progressing     Problem: METABOLIC, FLUID AND ELECTROLYTES - ADULT  Goal: Fluid balance maintained  Description: INTERVENTIONS:  - Monitor labs   - Monitor I/O and WT  -  Instruct patient on fluid and nutrition as appropriate  - Assess for signs & symptoms of volume excess or deficit  Outcome: Progressing     Problem: GENITOURINARY - ADULT  Goal: Absence of urinary retention  Description: INTERVENTIONS:  - Assess patient’s ability to void and empty bladder  - Monitor I/O  - Bladder scan as needed  - Discuss with physician/AP medications to alleviate retention as needed  - Discuss catheterization for long term situations as appropriate  Outcome: Progressing     Problem: GASTROINTESTINAL - ADULT  Goal: Maintains adequate nutritional intake  Description: INTERVENTIONS:  - Monitor percentage of each meal consumed  - Identify factors contributing to decreased intake, treat as appropriate  - Assist with meals as needed  - Monitor I&O, weight, and lab values if indicated  - Obtain nutrition services referral as needed  Outcome: Progressing     Problem: GASTROINTESTINAL - ADULT  Goal: Maintains or returns to baseline bowel function  Description: INTERVENTIONS:  - Assess bowel function  - Encourage oral fluids to ensure adequate hydration  - Administer IV fluids if ordered to ensure adequate hydration  - Administer ordered medications as needed  - Encourage mobilization and activity  - Consider nutritional services referral to assist patient with adequate nutrition and appropriate food choices  Outcome: Progressing     Problem: GASTROINTESTINAL - ADULT  Goal: Minimal or absence of nausea and/or vomiting  Description: INTERVENTIONS:  - Administer IV fluids if ordered to ensure adequate hydration  - Maintain NPO status until nausea and vomiting are resolved  - Nasogastric tube if ordered  - Administer ordered antiemetic medications as needed  - Provide nonpharmacologic comfort measures as appropriate  - Advance diet as tolerated, if ordered  - Consider nutrition services referral to assist patient with adequate nutrition and appropriate food choices  Outcome: Progressing     Problem:  CARDIOVASCULAR - ADULT  Goal: Absence of cardiac dysrhythmias or at baseline rhythm  Description: INTERVENTIONS:  - Continuous cardiac monitoring, vital signs, obtain 12 lead EKG if ordered  - Administer antiarrhythmic and heart rate control medications as ordered  - Monitor electrolytes and administer replacement therapy as ordered  Outcome: Progressing     Problem: NEUROSENSORY - ADULT  Goal: Achieves maximal functionality and self care  Description: INTERVENTIONS  - Monitor swallowing and airway patency with patient fatigue and changes in neurological status  - Encourage and assist patient to increase activity and self care.   - Encourage visually impaired, hearing impaired and aphasic patients to use assistive/communication devices  Outcome: Progressing     Problem: Knowledge Deficit  Goal: Patient/family/caregiver demonstrates understanding of disease process, treatment plan, medications, and discharge instructions  Description: Complete learning assessment and assess knowledge base.  Interventions:  - Provide teaching at level of understanding  - Provide teaching via preferred learning methods  Outcome: Progressing     Problem: DISCHARGE PLANNING  Goal: Discharge to home or other facility with appropriate resources  Description: INTERVENTIONS:  - Identify barriers to discharge w/patient and caregiver  - Arrange for needed discharge resources and transportation as appropriate  - Identify discharge learning needs (meds, wound care, etc.)  - Arrange for interpretive services to assist at discharge as needed  - Refer to Case Management Department for coordinating discharge planning if the patient needs post-hospital services based on physician/advanced practitioner order or complex needs related to functional status, cognitive ability, or social support system  Outcome: Progressing

## 2025-02-19 NOTE — DISCHARGE SUMMARY
"Discharge Summary - Trauma   Name: Arpan Hayes 49 y.o. male I MRN: [id number]  Unit/Bed#: [facility] 809-01 I Date of Admission: 2/18/2025   Date of Service: 2/19/2025 I Hospital Day: 0    Admission Date: 2/18/2025 0603  Discharge Date: 02/19/25  Admitting Diagnosis: Paraesophageal hernia [K44.9]  Discharge Diagnosis:   Medical Problems       Resolved Problems  Date Reviewed: 2/19/2025   None         HPI: per DR. PEREZ> Brown:  \"Arpan Hayes is a 49 y.o. male who presents with medically refractory GERD in the setting of a hiatal hernia.  He has had significant reflux with bad regurgitant symptoms.  No significant dysphagia, or odynophagia is tolerating a diet, moving his bowels normally.\"       Procedures Performed: No orders of the defined types were placed in this encounter.      Summary of Hospital Course: 48 y/o male admitted with medical refractory GERD in setting of a hiatal hernia.  Patient admitted for surgical procedure which was completed without any complications.  Doing well, ambulating in hallway, tolerating liquids and will be discharged home today.    Significant Findings, Care, Treatment and Services Provided: No results found.     Complications: none    Condition at Discharge: stable       Discharge instructions/Information to patient and family:   See After Visit Summary (AVS) for information provided to patient and family.      Provisions for Follow-Up Care:  See after visit summary for information related to follow-up care and any pertinent home health orders.      PCP: Tyler Robertson MD    Disposition: Home    Planned Readmission: No     Discharge Medications:  See after visit summary for reconciled discharge medications provided to patient and family.      Discharge Statement:  I have spent a total time of 25 minutes in caring for this patient on the day of the visit/encounter. .  "

## 2025-02-20 NOTE — ANESTHESIA POSTPROCEDURE EVALUATION
Post-Op Assessment Note    CV Status:  Stable  Pain Score: 0    Pain management: adequate       Mental Status:  Alert and awake   Hydration Status:  Euvolemic   PONV Controlled:  Controlled   Airway Patency:  Patent     Post Op Vitals Reviewed: Yes    No anethesia notable event occurred.    Staff: Anesthesiologist, CRNA           Last Filed PACU Vitals:  Vitals Value Taken Time   Temp 98    Pulse 91 02/18/25 1003   /62    Resp 24 02/18/25 1003   SpO2 99 % 02/18/25 1003   Vitals shown include unfiled device data.       Post-Op Assessment Note            No anethesia notable event occurred.    Staff: Anesthesiologist           Last Filed PACU Vitals:  Vitals Value Taken Time   Temp 98 °F (36.7 °C) 02/18/25 1004   Pulse 89 02/18/25 1139   /66 02/18/25 1130   Resp 15 02/18/25 1139   SpO2 97 % 02/18/25 1139   Vitals shown include unfiled device data.    Modified Murali:     Vitals Value Taken Time   Activity 2 02/18/25 1130   Respiration 2 02/18/25 1130   Circulation 2 02/18/25 1130   Consciousness 1 02/18/25 1130   Oxygen Saturation 2 02/18/25 1130     Modified Murali Score: 9

## 2025-03-06 ENCOUNTER — OFFICE VISIT (OUTPATIENT)
Dept: SURGERY | Facility: CLINIC | Age: 50
End: 2025-03-06

## 2025-03-06 VITALS — TEMPERATURE: 98.1 F | HEIGHT: 67 IN | BODY MASS INDEX: 24.39 KG/M2 | WEIGHT: 155.4 LBS

## 2025-03-06 DIAGNOSIS — K21.9 GASTROESOPHAGEAL REFLUX DISEASE WITHOUT ESOPHAGITIS: Primary | ICD-10-CM

## 2025-03-06 PROCEDURE — 99024 POSTOP FOLLOW-UP VISIT: CPT | Performed by: SURGERY

## 2025-03-06 NOTE — PROGRESS NOTES
Name: Arpan Hayes      : 1975      MRN: [id number]  Encounter Provider: James Ferguson MD  Encounter Date: 3/6/2025   Encounter department: [facility] [place]  :  Assessment & Plan  Gastroesophageal reflux disease without esophagitis  49-year-old male status post robotic paraesophageal hernia repair with partial fundoplication and umbilical hernia repair on 2025, here for follow-up.    Plan:  Plan:  - May continue to advance their diet to include all foods and liquids.  - Should continue with 6 small meals, and chewing her food thoroughly prior to swallowing.  - Should continue to abide by a 20 to 25 pound lifting restriction until 4 weeks postoperatively, at which time they may commence any and all activities.  - The patient should return to clinic in 6 weeks for a next visit.  -We tapered his PPI dosing to once daily with the plan to stop it at her next visit.               History of Present Illness   HPI  Arpan Hayes is a 49 y.o. male who presents s/p robotic paraesophageal hernia repair with partial fundoplication and EGD on 2025 for follow-up.  Overall, they are doing quite well without significant complaints.  They deny any significant pain, fevers, chills, chest pain, shortness of breath, nausea or vomiting.  They deny any significant dysphagia, odynophagia, or reflux.  They are tolerating their postop forgot diet and moving their bowels normally.  They have been doing most activities around the house, without restriction or limitation, while abiding by their lifting restrictions.      History obtained from: patient    Review of Systems   Constitutional:  Negative for appetite change, chills, diaphoresis and fever.   HENT:  Negative for nosebleeds and trouble swallowing.    Eyes: Negative.    Respiratory:  Negative for cough, shortness of breath and wheezing.    Cardiovascular:  Negative for chest pain, palpitations and leg swelling.   Gastrointestinal:   Negative for abdominal distention, abdominal pain, nausea and vomiting.   Genitourinary:  Negative for difficulty urinating, flank pain and frequency.   Musculoskeletal:  Negative for arthralgias, joint swelling and myalgias.   Skin:  Negative for pallor and rash.   Neurological:  Negative for dizziness, facial asymmetry and speech difficulty.   Hematological:  Does not bruise/bleed easily.   Psychiatric/Behavioral:  Negative for agitation and confusion.    All other systems reviewed and are negative.    Past Medical History   Past Medical History:   Diagnosis Date    GERD (gastroesophageal reflux disease)     PONV (postoperative nausea and vomiting)     PVC (premature ventricular contraction) resolved    Tenosynovitis of wrist      Past Surgical History:   Procedure Laterality Date    COLONOSCOPY      ESOPHAGOGASTRODUODENOSCOPY N/A 10/20/2016    Procedure: ESOPHAGOGASTRODUODENOSCOPY (EGD);  Surgeon: Philip Turcios MD;  Location: [facility];  Service:     ESOPHAGOGASTRODUODENOSCOPY N/A 2/18/2025    Procedure: ESOPHAGOGASTRODUODENOSCOPY (EGD);  Surgeon: James Ferguson MD;  Location: [facility];  Service: General    FEMUR SURGERY      Femur repair    TN LAPS RPR PARAESPHGL HRNA INCL FUNDPLSTY W/MESH N/A 2/18/2025    Procedure: Robotic paraesophageal hernia repair w/ fundoplication. Posterior gastropexy, Umbilical hernia repair;  Surgeon: James Ferguson MD;  Location: [facility];  Service: General    VASECTOMY       Family History   Problem Relation Age of Onset    Leukemia Mother     Corrales's esophagus Father     Coronary artery disease Father     Diabetes Father     Heart disease Father     Coronary artery disease Maternal Grandfather     Substance Abuse Neg Hx     Mental illness Neg Hx       reports that he has never smoked. He has never used smokeless tobacco. He reports current alcohol use. He reports that he does not use drugs.  Current Outpatient Medications   Medication Instructions    Calcium  "Carb-Cholecalciferol (CALCIUM 600 + D PO) Take by mouth 1 daily    gabapentin (NEURONTIN) 100 mg, Oral, 3 times daily    methocarbamol (ROBAXIN) 750 mg, Oral, Every 6 hours scheduled    Multiple Vitamin (MULTIVITAMIN) tablet 1 tablet, Daily    nebivolol (BYSTOLIC) 5 mg, Oral, Daily    ondansetron (ZOFRAN-ODT) 4 mg, Oral, Every 6 hours PRN    pantoprazole (PROTONIX) 40 mg, Oral, 2 times daily     Allergies   Allergen Reactions    Amoxicillin-Pot Clavulanate GI Intolerance      Current Outpatient Medications on File Prior to Visit   Medication Sig Dispense Refill    Calcium Carb-Cholecalciferol (CALCIUM 600 + D PO) Take by mouth 1 daily      Multiple Vitamin (MULTIVITAMIN) tablet Take 1 tablet by mouth daily.      nebivolol (BYSTOLIC) 5 mg tablet Take 1 tablet (5 mg total) by mouth daily 90 tablet 3    pantoprazole (PROTONIX) 40 mg tablet Take 1 tablet (40 mg total) by mouth 2 (two) times a day 60 tablet 0    gabapentin (NEURONTIN) 100 mg capsule Take 1 capsule (100 mg total) by mouth 3 (three) times a day for 7 days 21 capsule 0    methocarbamol (ROBAXIN) 750 mg tablet Take 1 tablet (750 mg total) by mouth every 6 (six) hours for 7 days 28 tablet 0    ondansetron (ZOFRAN-ODT) 4 mg disintegrating tablet Take 1 tablet (4 mg total) by mouth every 6 (six) hours as needed for nausea or vomiting for up to 7 days 28 tablet 0     No current facility-administered medications on file prior to visit.      Social History     Tobacco Use    Smoking status: Never    Smokeless tobacco: Never   Vaping Use    Vaping status: Never Used   Substance and Sexual Activity    Alcohol use: Yes     Comment: occasional wine    Drug use: Never    Sexual activity: Not on file        Objective   Temp 98.1 °F (36.7 °C) (Temporal)   Ht 5' 7\" (1.702 m)   Wt 70.5 kg (155 lb 6.4 oz)   BMI 24.34 kg/m²      Physical Exam  Vitals and nursing note reviewed.   Constitutional:       General: He is not in acute distress.     Appearance: Normal appearance. " He is not ill-appearing.   HENT:      Head: Normocephalic and atraumatic.      Mouth/Throat:      Mouth: Mucous membranes are moist.      Pharynx: Oropharynx is clear.   Eyes:      Extraocular Movements: Extraocular movements intact.   Cardiovascular:      Rate and Rhythm: Normal rate and regular rhythm.   Pulmonary:      Effort: Pulmonary effort is normal. No respiratory distress.      Breath sounds: No stridor. No wheezing.   Abdominal:      General: There is no distension.      Palpations: Abdomen is soft. There is no mass.      Tenderness: There is no abdominal tenderness.      Hernia: No hernia is present.      Comments: Well-healed port sites no evidence of hernia.   Musculoskeletal:         General: No swelling or tenderness. Normal range of motion.      Cervical back: Neck supple.   Skin:     General: Skin is warm and dry.   Neurological:      General: No focal deficit present.      Mental Status: He is alert and oriented to person, place, and time. Mental status is at baseline.   Psychiatric:         Mood and Affect: Mood normal.         Behavior: Behavior normal.

## 2025-03-06 NOTE — ASSESSMENT & PLAN NOTE
49-year-old male status post robotic paraesophageal hernia repair with partial fundoplication and umbilical hernia repair on 2/18/2025, here for follow-up.    Plan:  Plan:  - May continue to advance their diet to include all foods and liquids.  - Should continue with 6 small meals, and chewing her food thoroughly prior to swallowing.  - Should continue to abide by a 20 to 25 pound lifting restriction until 4 weeks postoperatively, at which time they may commence any and all activities.  - The patient should return to clinic in 6 weeks for a next visit.  -We tapered his PPI dosing to once daily with the plan to stop it at her next visit.            Anesthesia Pre-Procedure Evaluation    Patient: Lazaro Lund   MRN:     [id number] Gender:   male   Age:    23 year old :      1996        Preoperative Diagnosis: Lymphoma (H) [C85.90]   Procedure(s):  Lumbar puncture with intrathecal Chemotherapy (CD)     LABS:  CBC:   Lab Results   Component Value Date    WBC 1.6 (L) 2020    WBC 1.5 (L) 2020    HGB 9.4 (L) 2020    HGB 10.1 (L) 2020    HCT 26.4 (L) 2020    HCT 28.6 (L) 2020     2020    PLT 71 (L) 2020     BMP:   Lab Results   Component Value Date     2020     2020    POTASSIUM 3.6 2020    POTASSIUM 4.2 2020    CHLORIDE 112 (H) 2020    CHLORIDE 109 2020    CO2 27 2020    CO2 28 2020    BUN 14 2020    BUN 21 2020    CR 0.64 (L) 2020    CR 0.82 2020    GLC 98 2020    GLC 92 2020     COAGS:   Lab Results   Component Value Date    PTT 45 (H) 2019    INR 1.09 2019    FIBR 293 2019     POC:   Lab Results   Component Value Date    BGM 87 2019     OTHER:   Lab Results   Component Value Date    LACT 0.4 (L) 2019    MICHAEL 8.2 (L) 2020    PHOS 4.2 2019    MAG 2.1 2019    ALBUMIN 3.7 2020    PROTTOTAL 6.2 (L) 2020    ALT 22 2020    AST 11 2020    ALKPHOS 68 2020    BILITOTAL 0.2 2020    LIPASE 12 (L) 2020    AMYLASE 246 (H) 2019    .0 (H) 2019        Preop Vitals    BP Readings from Last 3 Encounters:   20 113/63   20 123/62   04/09/20 123/71    Pulse Readings from Last 3 Encounters:   20 83   20 95   20 95      Resp Readings from Last 3 Encounters:   20 16   20 16   20 18    SpO2 Readings from Last 3 Encounters:   20 98%   20 100%   20 98%      Temp Readings from Last 1 Encounters:   20 36.8  C (98.3  F) (Oral)    Ht Readings from Last 1  "Encounters:   04/23/20 1.842 m (6' 0.52\")      Wt Readings from Last 1 Encounters:   04/23/20 87.4 kg (192 lb 10.9 oz)    Estimated body mass index is 25.76 kg/m  as calculated from the following:    Height as of this encounter: 1.842 m (6' 0.52\").    Weight as of this encounter: 87.4 kg (192 lb 10.9 oz).     LDA:  Port A Cath Single 10/24/19 Right Chest wall (Active)   Access Date 04/16/20 04/16/20 0735   Access Attempts 1 04/16/20 0735   Gauge Power noncoring 90 degree bend;20 gauge;3/4 inch 04/16/20 0735   Site Assessment WDL 04/16/20 0735   Line Status Heparin locked 04/16/20 0827   Extravasation? No 04/16/20 0735   Dressing Intervention Transparent 04/16/20 0735   Dressing change due 04/02/20 03/26/20 0845   Needle Change Due 04/02/20 03/26/20 0845   Line Necessity Yes, meets criteria 03/26/20 0845   De-Access Date 04/16/20 04/16/20 0827   Date to be Reflushed 05/14/20 04/16/20 0827   Number of days: 182       Airway - Adult/Peds (Active)   Number of days: 28        Past Medical History:   Diagnosis Date     Acute necrotizing pancreatitis 11/07/2019    attributed to asparaginase     Acute pancreatitis due to PEGaspariginase therapy  11/15/2019     DVT of upper extremity (deep vein thrombosis) (H) 09/26/2019    Bilateral      Edema of upper extremity 10/17/2019     Folliculitis 10/17/2019     Migraine 2006    have resolved     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      Past Surgical History:   Procedure Laterality Date     BONE MARROW BIOPSY, BONE SPECIMEN, NEEDLE/TROCAR Left 9/1/2019    Procedure: BIOPSY, BONE MARROW;  Surgeon: Heather Lopez MD;  Location: [facility]     INSERT PICC LINE N/A 8/31/2019    Procedure: INSERTION, PICC;  Surgeon: Michell Keith MD;  [facility]: [facility]     INSERT PORT VASCULAR ACCESS N/A 10/24/2019    Procedure: INSERTION, VASCULAR ACCESS PORT;  Surgeon: Silviano Martins MD;  [facility]      IR CHEST PORT PLACEMENT > 5 YRS OF AGE  10/24/2019     IR CHEST TUBE " PLACEMENT NON-TUNNELLED LEFT  8/31/2019     IR PICC PLACEMENT > 5 YRS OF AGE  8/31/2019     IR PORT CHECK RIGHT  11/12/2019     SPINAL PUNCTURE,LUMBAR, INTRATHECAL CHEMO DELIVERY N/A 8/31/2019    Procedure: LUMBAR PUNCTURE, WITH INTRATHECAL CHEMOTHERAPY ADMINISTRATION;  Surgeon: Heather Lopez MD;  [facility]     SPINAL PUNCTURE,LUMBAR, INTRATHECAL CHEMO DELIVERY N/A 9/9/2019    Procedure: Lumbar Puncture With Intrathecal Chemo;  Surgeon: Alexi Hayes MD;  [facility]     SPINAL PUNCTURE,LUMBAR, INTRATHECAL CHEMO DELIVERY N/A 10/10/2019    Procedure: Lumbar puncture with IT Chemo (CD);  Surgeon: Reynaldo Campuzano MD;  [facility]      SPINAL PUNCTURE,LUMBAR, INTRATHECAL CHEMO DELIVERY N/A 10/17/2019    Procedure: Lumbar puncture with IT Chemo (not CD);  Surgeon: Reynaldo Campuzano MD;  [facility]      SPINAL PUNCTURE,LUMBAR, INTRATHECAL CHEMO DELIVERY N/A 10/24/2019    Procedure: LUMBAR PUNCTURE, WITH INTRATHECAL CHEMOTHERAPY ADMINISTRATION;  Surgeon: Félix Van APRN CNP;  [facility]      SPINAL PUNCTURE,LUMBAR, INTRATHECAL CHEMO DELIVERY N/A 10/31/2019    Procedure: Lumbar puncture with IT Chemo (not CD);  Surgeon: Reynaldo Campuzano MD;  [facility]      SPINAL PUNCTURE,LUMBAR, INTRATHECAL CHEMO DELIVERY N/A 12/19/2019    Procedure: Lumbar puncture with IT Chem (CD);  Surgeon: Félix Van APRN CNP;  [facility]      SPINAL PUNCTURE,LUMBAR, INTRATHECAL CHEMO DELIVERY N/A 12/23/2019    Procedure: Lumbar puncture with IT Chem (CD);  Surgeon: Heather Lopez MD;  [facility]      SPINAL PUNCTURE,LUMBAR, INTRATHECAL CHEMO DELIVERY N/A 1/23/2020    Procedure: Lumbar puncture with IT Chem (CD);  Surgeon: Reynaldo Campuzano MD;  [facility]      SPINAL PUNCTURE,LUMBAR, INTRATHECAL CHEMO DELIVERY N/A 2/20/2020    Procedure: Lumbar puncture with  intrathecal Chemotherapy (CD);  Surgeon: Reynaldo Campuzano MD;  [facility]      SPINAL PUNCTURE,LUMBAR, INTRATHECAL CHEMO DELIVERY N/A 3/19/2020    Procedure: Lumbar puncture with intrathecal Chemotherapy (CD);  Surgeon: Reynaldo Campuzano MD;  [facility]      SPINAL PUNCTURE,LUMBAR, INTRATHECAL CHEMO DELIVERY N/A 3/26/2020    Procedure: Lumbar puncture with intrathecal Chemotherapy (not CD);  Surgeon: Todd Mercado MD;  [facility]      THORACENTESIS N/A 8/31/2019    Procedure: Thoracentesis;  Surgeon: Michell Keith MD;  [facility]      Allergies   Allergen Reactions     Asparaginase Derivatives Other (See Comments)     Severe pancreatitis     No Known Drug Allergies         Anesthesia Evaluation    ROS/Med Hx    No history of anesthetic complications    Cardiovascular Findings   Comments: TTE 02/04/2020: Normal echocardiogram. Normal appearance and motion of the tricuspid, mitral, pulmonary and aortic valves. No atrial, ventricular or arterial level shunting. LV and RV have normal chamber size, wall thickness, and systolic function. LVEF 67 %.    Neuro Findings   Comments: Migraine   Neuropathic pain   Insomnia due to medical condition         Pulmonary Findings - negative ROS    HENT Findings - negative HENT ROS    Skin Findings - negative skin ROS      GI/Hepatic/Renal Findings - negative ROS    Endocrine/Metabolic Findings       Comments: Vitamin D deficiency     Genetic/Syndrome Findings - negative genetics/syndromes ROS    Hematology/Oncology Findings   (+) cancer (  Lymphoma (H) ) and blood dyscrasia    Additional Notes  Edema of upper extremity     Port-A-Cath in place       Smokes marijuana    Nicotine patch          PHYSICAL EXAM:   Mental Status/Neuro: A/A/O   Airway: Facies: Feasible  Mallampati: I  Mouth/Opening: Full  TM distance: > 6 cm  Neck ROM: Full   Respiratory: Auscultation: CTAB     Resp. Rate: Normal     Resp. Effort:  Normal      CV: Rhythm: Regular  Rate: Age appropriate  Heart: Normal Sounds  Edema: None   Comments:      Dental: Normal Dentition                Assessment:   ASA SCORE: 3    H&P: History and physical reviewed and following examination; no interval change.   Smoking Status:  Non-Smoker/Unknown   NPO Status: NPO Appropriate     Plan:   Anes. Type:  General   Pre-Medication: None   Induction:  IV (Standard)   Airway: Native Airway   Access/Monitoring: PIV   Maintenance: Propofol Sedation     Postop Plan:   Postop Pain: None  Postop Sedation/Airway: Not planned  Disposition: Outpatient     PONV Management:   Adult Risk Factors:, Non-Smoker   Prevention: Ondansetron, Propofol     CONSENT: Direct conversation   Plan and risks discussed with: Patient   Blood Products: Consent Deferred (Minimal Blood Loss)       Comments for Plan/Consent:  Discussed common and potentially harmful risks for General Anesthesia, Native Airway.   These risks include, but were not limited to: Conversion to secured airway, Sore throat, Airway injury, Dental injury, Aspiration, Respiratory issues (Bronchospasm, Laryngospasm, Desaturation), Hemodynamic issues (Arrhythmia, Hypotension, Ischemia), Potential long term consequences of respiratory and hemodynamic issues, PONV, Emergence delirium  Risks of invasive procedures were not discussed: N/A    All questions were answered.           Jia Angeles MD

## 2025-03-28 DIAGNOSIS — K21.9 GASTROESOPHAGEAL REFLUX DISEASE WITHOUT ESOPHAGITIS: ICD-10-CM

## 2025-03-28 RX ORDER — PANTOPRAZOLE SODIUM 40 MG/1
40 TABLET, DELAYED RELEASE ORAL 2 TIMES DAILY
Qty: 60 TABLET | Refills: 0 | Status: SHIPPED | OUTPATIENT
Start: 2025-03-28 | End: 2025-04-27

## 2025-04-04 ENCOUNTER — OFFICE VISIT (OUTPATIENT)
Dept: FAMILY MEDICINE CLINIC | Facility: HOSPITAL | Age: 50
End: 2025-04-04
Payer: COMMERCIAL

## 2025-04-04 VITALS
WEIGHT: 148 LBS | SYSTOLIC BLOOD PRESSURE: 112 MMHG | OXYGEN SATURATION: 97 % | HEIGHT: 67 IN | TEMPERATURE: 97.4 F | HEART RATE: 76 BPM | BODY MASS INDEX: 23.23 KG/M2 | RESPIRATION RATE: 16 BRPM | DIASTOLIC BLOOD PRESSURE: 70 MMHG

## 2025-04-04 DIAGNOSIS — Z00.00 ANNUAL PHYSICAL EXAM: ICD-10-CM

## 2025-04-04 DIAGNOSIS — H61.23 HEARING LOSS OF BOTH EARS DUE TO CERUMEN IMPACTION: Primary | ICD-10-CM

## 2025-04-04 PROBLEM — R03.0 PREHYPERTENSION: Status: RESOLVED | Noted: 2024-08-30 | Resolved: 2025-04-04

## 2025-04-04 PROCEDURE — 69209 REMOVE IMPACTED EAR WAX UNI: CPT | Performed by: INTERNAL MEDICINE

## 2025-04-04 PROCEDURE — 99213 OFFICE O/P EST LOW 20 MIN: CPT | Performed by: INTERNAL MEDICINE

## 2025-04-04 PROCEDURE — 99396 PREV VISIT EST AGE 40-64: CPT | Performed by: INTERNAL MEDICINE

## 2025-04-04 NOTE — PATIENT INSTRUCTIONS
"Patient Education     Routine physical for adults   The Basics   Written by the doctors and editors at [facility]   What is a physical? -- A physical is a routine visit, or \"check-up,\" with your doctor. You might also hear it called a \"wellness visit\" or \"preventive visit.\"  During each visit, the doctor will:   Ask about your physical and mental health   Ask about your habits, behaviors, and lifestyle   Do an exam   Give you vaccines if needed   Talk to you about any medicines you take   Give advice about your health   Answer your questions  Getting regular check-ups is an important part of taking care of your health. It can help your doctor find and treat any problems you have. But it's also important for preventing health problems.  A routine physical is different from a \"sick visit.\" A sick visit is when you see a doctor because of a health concern or problem. Since physicals are scheduled ahead of time, you can think about what you want to ask the doctor.  How often should I get a physical? -- It depends on your age and health. In general, for people age 21 years and older:   If you are younger than 50 years, you might be able to get a physical every 3 years.   If you are 50 years or older, your doctor might recommend a physical every year.  If you have an ongoing health condition, like diabetes or high blood pressure, your doctor will probably want to see you more often.  What happens during a physical? -- In general, each visit will include:   Physical exam - The doctor or nurse will check your height, weight, heart rate, and blood pressure. They will also look at your eyes and ears. They will ask about how you are feeling and whether you have any symptoms that bother you.   Medicines - It's a good idea to bring a list of all the medicines you take to each doctor visit. Your doctor will talk to you about your medicines and answer any questions. Tell them if you are having any side effects that bother you. You " "should also tell them if you are having trouble paying for any of your medicines.   Habits and behaviors - This includes:   Your diet   Your exercise habits   Whether you smoke, drink alcohol, or use drugs   Whether you are sexually active   Whether you feel safe at home  Your doctor will talk to you about things you can do to improve your health and lower your risk of health problems. They will also offer help and support. For example, if you want to quit smoking, they can give you advice and might prescribe medicines. If you want to improve your diet or get more physical activity, they can help you with this, too.   Lab tests, if needed - The tests you get will depend on your age and situation. For example, your doctor might want to check your:   Cholesterol   Blood sugar   Iron level   Vaccines - The recommended vaccines will depend on your age, health, and what vaccines you already had. Vaccines are very important because they can prevent certain serious or deadly infections.   Discussion of screening - \"Screening\" means checking for diseases or other health problems before they cause symptoms. Your doctor can recommend screening based on your age, risk, and preferences. This might include tests to check for:   Cancer, such as breast, prostate, cervical, ovarian, colorectal, prostate, lung, or skin cancer   Sexually transmitted infections, such as chlamydia and gonorrhea   Mental health conditions like depression and anxiety  Your doctor will talk to you about the different types of screening tests. They can help you decide which screenings to have. They can also explain what the results might mean.   Answering questions - The physical is a good time to ask the doctor or nurse questions about your health. If needed, they can refer you to other doctors or specialists, too.  Adults older than 65 years often need other care, too. As you get older, your doctor will talk to you about:   How to prevent falling at " home   Hearing or vision tests   Memory testing   How to take your medicines safely   Making sure that you have the help and support you need at home  All topics are updated as new evidence becomes available and our peer review process is complete.  This topic retrieved from [employer] on: May 02, 2024.  Topic 754127 Version 1.0  Release: 32.4.3 - C32.122  © 2024 UpToDate, Inc. and/or its affiliates. All rights reserved.  Consumer Information Use and Disclaimer   Disclaimer: This generalized information is a limited summary of diagnosis, treatment, and/or medication information. It is not meant to be comprehensive and should be used as a tool to help the user understand and/or assess potential diagnostic and treatment options. It does NOT include all information about conditions, treatments, medications, side effects, or risks that may apply to a specific patient. It is not intended to be medical advice or a substitute for the medical advice, diagnosis, or treatment of a health care provider based on the health care provider's examination and assessment of a patient's specific and unique circumstances. Patients must speak with a health care provider for complete information about their health, medical questions, and treatment options, including any risks or benefits regarding use of medications. This information does not endorse any treatments or medications as safe, effective, or approved for treating a specific patient. UpToDate, Inc. and its affiliates disclaim any warranty or liability relating to this information or the use thereof.The use of this information is governed by the Terms of Use, available at https://www.[employer].com/en/know/clinical-effectiveness-terms. 2024© UpToDate, Inc. and its affiliates and/or licensors. All rights reserved.  Copyright   © 2024 UpToDate, Inc. and/or its affiliates. All rights reserved.

## 2025-04-04 NOTE — PROGRESS NOTES
"Adult Annual Physical  Name: Arpan Hayes      : 1975      MRN: [id number]  Encounter Provider: Tyler Robertson MD  Encounter Date: 2025   Encounter department: [facility] [address]    :  Assessment & Plan  Hearing loss of both ears due to cerumen impaction  Patient had decreased hearing due to bilateral cerumen impaction.  I removed the cerumen by lavage in both ears.  Hearing improved after procedure.  Orders:  •  Ear cerumen removal    Annual physical exam         Annual physical exam               Preventive Screenings:  - Diabetes Screening: screening up-to-date and risks/benefits discussed  - Cholesterol Screening: risks/benefits discussed and screening up-to-date   - Hepatitis C screening: screening up-to-date   - HIV screening: screening up-to-date   - Colon cancer screening: screening up-to-date and risks/benefits discussed   - Lung cancer screening: screening not indicated     Counseling/Anticipatory Guidance:    - Exercise: the importance of regular exercise/physical activity was discussed. Recommend exercise 3-5 times per week for at least 30 minutes.       Depression Screening and Follow-up Plan: Patient was screened for depression during today's encounter. They screened negative with a PHQ-2 score of 2.          History of Present Illness     Adult Annual Physical:  Patient presents for annual physical.     Diet and Physical Activity:  - Diet/Nutrition: well balanced diet.  - Exercise: walking.    Depression Screening:  - PHQ-2 Score: 2    General Health:    - Hearing: decreased hearing bilateral ears.  - Vision: wears glasses.  - Dental: regular dental visits.    /GYN Health:    - History of STDs: no     Health:  - History of STDs: no.     Review of Systems      Objective   /70   Pulse 76   Temp (!) 97.4 °F (36.3 °C) (Tympanic)   Resp 16   Ht 5' 6.5\" (1.689 m)   Wt 67.1 kg (148 lb)   SpO2 97%   BMI 23.53 kg/m²     Physical Exam  Constitutional:  " "     General: He is not in acute distress.     Appearance: He is well-developed. He is not toxic-appearing.   HENT:      Head: Normocephalic.      Right Ear: There is impacted cerumen.      Left Ear: There is impacted cerumen.   Eyes:      Conjunctiva/sclera: Conjunctivae normal.   Cardiovascular:      Rate and Rhythm: Normal rate and regular rhythm.      Heart sounds: No murmur heard.  Pulmonary:      Effort: No respiratory distress.      Breath sounds: No wheezing or rales.   Abdominal:      General: Bowel sounds are normal.      Palpations: Abdomen is soft.      Tenderness: There is no abdominal tenderness.   Musculoskeletal:      Cervical back: Neck supple.   Skin:     General: Skin is warm and dry.   Neurological:      Mental Status: He is alert and oriented to person, place, and time.      Cranial Nerves: No cranial nerve deficit.      Motor: No weakness.   Psychiatric:         Mood and Affect: Mood normal.       Ear cerumen removal    Date/Time: 4/4/2025 1:00 PM    Performed by: Tyler Robertson MD  Authorized by: Tyler Robertson MD  Universal Protocol:  procedure performed by consultantConsent: Verbal consent obtained.  Risks and benefits: risks, benefits and alternatives were discussed  Consent given by: patient  Time out: Immediately prior to procedure a \"time out\" was called to verify the correct patient, procedure, equipment, support staff and site/side marked as required.  Patient identity confirmed: verbally with patient    Patient location:  Clinic  Procedure details:     Local anesthetic:  None    Location:  L ear and R ear    Procedure type: irrigation only      Approach:  External  Post-procedure details:     Complication:  None    Hearing quality:  Improved    Patient tolerance of procedure:  Tolerated well, no immediate complications       "

## 2025-04-22 DIAGNOSIS — K21.9 GASTROESOPHAGEAL REFLUX DISEASE WITHOUT ESOPHAGITIS: ICD-10-CM

## 2025-04-23 RX ORDER — PANTOPRAZOLE SODIUM 40 MG/1
40 TABLET, DELAYED RELEASE ORAL 2 TIMES DAILY
Qty: 180 TABLET | Refills: 1 | Status: SHIPPED | OUTPATIENT
Start: 2025-04-23

## 2025-05-30 ENCOUNTER — OFFICE VISIT (OUTPATIENT)
Dept: CARDIOLOGY CLINIC | Facility: CLINIC | Age: 50
End: 2025-05-30
Payer: COMMERCIAL

## 2025-05-30 VITALS
WEIGHT: 149 LBS | SYSTOLIC BLOOD PRESSURE: 100 MMHG | DIASTOLIC BLOOD PRESSURE: 64 MMHG | BODY MASS INDEX: 23.39 KG/M2 | HEIGHT: 67 IN | HEART RATE: 70 BPM

## 2025-05-30 DIAGNOSIS — I47.19 ATRIAL TACHYCARDIA (HCC): ICD-10-CM

## 2025-05-30 DIAGNOSIS — I49.3 PVC'S (PREMATURE VENTRICULAR CONTRACTIONS): Primary | ICD-10-CM

## 2025-05-30 LAB
ATRIAL RATE: 70 BPM
P AXIS: 90 DEGREES
PR INTERVAL: 134 MS
QRS AXIS: 76 DEGREES
QRSD INTERVAL: 94 MS
QT INTERVAL: 390 MS
QTC INTERVAL: 421 MS
T WAVE AXIS: -3 DEGREES
VENTRICULAR RATE: 70 BPM

## 2025-05-30 PROCEDURE — 93000 ELECTROCARDIOGRAM COMPLETE: CPT | Performed by: INTERNAL MEDICINE

## 2025-05-30 PROCEDURE — 99213 OFFICE O/P EST LOW 20 MIN: CPT | Performed by: INTERNAL MEDICINE

## 2025-05-30 NOTE — PROGRESS NOTES
EPS Progress Note - Arpan Hayes 49 y.o. male MRN: [id number]           ASSESSMENT:  1. PVC's (premature ventricular contractions)  POCT ECG      2. Atrial tachycardia (HCC)                PLAN:  We will continue the same dose of Nebivolol it seems to really have abated his paroxysms of atrial tachycardia and PVCs.  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    From a cardiac perspective he is doing remarkably well    Will continue aggressive risk factor modification given family history    Follow-up 1 year    HPI:   Interim history very rare palpitations.  Last a few seconds.  No chest pain or shortness of breath with exertion.  He did undergo surgery for reflux in February and it has taken him a while to recover he is lost 10 to 15 pounds.  Some fatigue but he believes it is not related to the medication but more to recovery from surgery.    He is up to walking 2 miles with his wife              Review of Systems   Constitutional: Positive for malaise/fatigue. Negative for chills and fever.   HENT:  Negative for congestion and sore throat.    Eyes:  Negative for blurred vision and double vision.   Cardiovascular:  Positive for palpitations. Negative for chest pain, claudication, dyspnea on exertion, leg swelling, near-syncope, orthopnea, paroxysmal nocturnal dyspnea and syncope.   Respiratory:  Negative for cough, shortness of breath and sputum production.    Hematologic/Lymphatic: Negative for bleeding problem. Does not bruise/bleed easily.   Skin:  Negative for itching and rash.   Musculoskeletal:  Negative for arthritis and joint pain.   Gastrointestinal:  Negative for abdominal pain, nausea and vomiting.   Genitourinary:  Negative for hematuria.   Neurological:  Negative for dizziness, focal weakness, headaches, light-headedness and weakness.  "  Psychiatric/Behavioral:  Negative for depression. The patient is not nervous/anxious.         Objective:     Vitals: Blood pressure 100/64, pulse 70, height 5' 6.5\" (1.689 m), weight 67.6 kg (149 lb)., Body mass index is 23.69 kg/m².,        Physical Exam:    GEN: Arpan Hayes appears well, alert and oriented x 3, pleasant and cooperative   HEENT: pupils equal, round, and reactive to light; extraocular muscles intact  NECK: supple, no carotid bruits   HEART: regular rhythm, normal S1 and S2, no murmurs, clicks, gallops or rubs   LUNGS: clear to auscultation bilaterally; no wheezes, rales, or rhonchi   ABDOMEN: normal bowel sounds, soft, no tenderness, no distention  EXTREMITIES: peripheral pulses normal; no clubbing, cyanosis, or edema  NEURO: no focal findings   SKIN: normal without suspicious lesions on exposed skin    Medications:    Current Medications[1]     Family History[2]  Social History     Socioeconomic History    Marital status: /Civil Union     Spouse name: Not on file    Number of children: Not on file    Years of education: Not on file    Highest education level: Not on file   Occupational History    Not on file   Tobacco Use    Smoking status: Never    Smokeless tobacco: Never   Vaping Use    Vaping status: Never Used   Substance and Sexual Activity    Alcohol use: Yes     Comment: occasional wine    Drug use: Never    Sexual activity: Not on file   Other Topics Concern    Not on file   Social History Narrative    No alcohol use - As per [employer]     Wears seatbelt    Regular dental care    Limited exercise    No living will     Social Drivers of Health     Financial Resource Strain: Not on file   Food Insecurity: No Food Insecurity (2/18/2025)    Nursing - Inadequate Food Risk Classification     Worried About Running Out of Food in the Last Year: Not on file     Ran Out of Food in the Last Year: Not on file     Ran Out of Food in the Last Year: Never true   Transportation Needs: No " Transportation Needs (2025)    Nursing - Transportation Risk Classification     Lack of Transportation: Not on file     Lack of Transportation: No   Physical Activity: Inactive (2021)    Exercise Vital Sign     Days of Exercise per Week: 0 days     Minutes of Exercise per Session: 0 min   Stress: Stress Concern Present (2021)    [place] [employer] [place] [employer] - Occupational Stress Questionnaire     Feeling of Stress : Rather much   Social Connections: Not on file   Intimate Partner Violence: Unknown (2025)    Nursing IPS     Feels Physically and Emotionally Safe: Not on file     Physically Hurt by Someone: Not on file     Humiliated or Emotionally Abused by Someone: Not on file     Physically Hurt by Someone: No     Hurt or Threatened by Someone: No   Housing Stability: Unknown (2025)    Nursing: Inadequate Housing Risk Classification     Has Housing: Not on file     Worried About Losing Housing: Not on file     Unable to Get Utilities: Not on file     Unable to Pay for Housing in the Last Year: No     Has Housin     Tobacco Use History[3]  Social History     Substance and Sexual Activity   Alcohol Use Yes    Comment: occasional wine       Labs & Results:  Below is the patient's most recent value for Albumin, ALT, AST, BUN, Calcium, Chloride, Cholesterol, CO2, Creatinine, GFR, Glucose, HDL, Hematocrit, Hemoglobin, Hemoglobin A1C, LDL, Magnesium, Phosphorus, Platelets, Potassium, PSA, Sodium, Triglycerides, and WBC.   Lab Results   Component Value Date    ALT 22 2025    AST 18 2025    BUN 16 2025    CALCIUM 9.2 2025    CL 99 2025    CHOL 132 2015    CO2 31 2025    CREATININE 0.93 2025    HDL 53 2025    HCT 41.8 2025    HGB 12.7 2025    HGBA1C 5.3 2023    MG 2.2 2021     2025    K 4.0 2025     04/10/2015    TRIG 61 2025    WBC 10.00 2025     Note: for a comprehensive  list of the patient's lab results, access the Results Review activity.            Cardiac testing:   No results found for this or any previous visit.    No results found for this or any previous visit.    No results found for this or any previous visit.    No results found for this or any previous visit.                     [1]   Current Outpatient Medications:     Calcium Carb-Cholecalciferol (CALCIUM 600 + D PO), Take by mouth 1 daily, Disp: , Rfl:     Multiple Vitamin (MULTIVITAMIN) tablet, Take 1 tablet by mouth in the morning., Disp: , Rfl:     nebivolol (BYSTOLIC) 5 mg tablet, Take 1 tablet (5 mg total) by mouth daily, Disp: 90 tablet, Rfl: 3  [2]   Family History  Problem Relation Name Age of Onset    Leukemia Mother      Corrales's esophagus Father      Coronary artery disease Father      Diabetes Father      Heart disease Father      Coronary artery disease Maternal Grandfather      Substance Abuse Neg Hx      Mental illness Neg Hx     [3]   Social History  Tobacco Use   Smoking Status Never   Smokeless Tobacco Never

## 2025-06-01 DIAGNOSIS — I49.3 PVC'S (PREMATURE VENTRICULAR CONTRACTIONS): ICD-10-CM

## 2025-06-01 DIAGNOSIS — R73.09 ELEVATED GLUCOSE LEVEL: ICD-10-CM

## 2025-06-01 DIAGNOSIS — I48.91 ATRIAL FIBRILLATION, UNSPECIFIED TYPE (HCC): Primary | ICD-10-CM

## 2025-06-13 ENCOUNTER — APPOINTMENT (OUTPATIENT)
Dept: LAB | Facility: CLINIC | Age: 50
End: 2025-06-13
Attending: INTERNAL MEDICINE
Payer: COMMERCIAL

## 2025-06-13 DIAGNOSIS — Z00.00 ANNUAL PHYSICAL EXAM: ICD-10-CM

## 2025-06-13 DIAGNOSIS — I48.91 ATRIAL FIBRILLATION, UNSPECIFIED TYPE (HCC): ICD-10-CM

## 2025-06-13 DIAGNOSIS — I49.3 PVC'S (PREMATURE VENTRICULAR CONTRACTIONS): ICD-10-CM

## 2025-06-13 DIAGNOSIS — R73.09 ELEVATED GLUCOSE LEVEL: ICD-10-CM

## 2025-06-13 DIAGNOSIS — Z00.00 ANNUAL PHYSICAL EXAM: Primary | ICD-10-CM

## 2025-06-13 LAB
ANION GAP SERPL CALCULATED.3IONS-SCNC: 10 MMOL/L (ref 4–13)
BUN SERPL-MCNC: 15 MG/DL (ref 5–25)
CALCIUM SERPL-MCNC: 9.3 MG/DL (ref 8.4–10.2)
CHLORIDE SERPL-SCNC: 105 MMOL/L (ref 96–108)
CO2 SERPL-SCNC: 25 MMOL/L (ref 21–32)
CREAT SERPL-MCNC: 0.88 MG/DL (ref 0.6–1.3)
CRP SERPL HS-MCNC: 1 MG/L
EST. AVERAGE GLUCOSE BLD GHB EST-MCNC: 123 MG/DL
GFR SERPL CREATININE-BSD FRML MDRD: 102 ML/MIN/1.73SQ M
GLUCOSE P FAST SERPL-MCNC: 90 MG/DL (ref 65–99)
HBA1C MFR BLD: 5.9 %
POTASSIUM SERPL-SCNC: 5.2 MMOL/L (ref 3.5–5.3)
SODIUM SERPL-SCNC: 140 MMOL/L (ref 135–147)

## 2025-06-13 PROCEDURE — 83036 HEMOGLOBIN GLYCOSYLATED A1C: CPT

## 2025-06-13 PROCEDURE — 86141 C-REACTIVE PROTEIN HS: CPT

## 2025-06-13 PROCEDURE — 36415 COLL VENOUS BLD VENIPUNCTURE: CPT

## 2025-06-13 PROCEDURE — 80048 BASIC METABOLIC PNL TOTAL CA: CPT

## 2025-07-21 ENCOUNTER — DOCUMENTATION (OUTPATIENT)
Dept: CARDIOLOGY CLINIC | Facility: CLINIC | Age: 50
End: 2025-07-21

## 2025-07-21 DIAGNOSIS — E78.49 OTHER HYPERLIPIDEMIA: Primary | ICD-10-CM

## 2025-07-21 RX ORDER — ROSUVASTATIN CALCIUM 5 MG/1
5 TABLET, COATED ORAL DAILY
Qty: 90 TABLET | Refills: 3 | Status: SHIPPED | OUTPATIENT
Start: 2025-07-21 | End: 2025-07-21 | Stop reason: SDUPTHER

## 2025-07-21 RX ORDER — ROSUVASTATIN CALCIUM 5 MG/1
5 TABLET, COATED ORAL DAILY
Qty: 90 TABLET | Refills: 3 | Status: SHIPPED | OUTPATIENT
Start: 2025-07-21

## (undated) DEVICE — KIT, BETHLEHEM THORACIC ROBOT: Brand: CARDINAL HEALTH

## (undated) DEVICE — FIRST STEP BEDSIDE KIT - STAND-UP POUCH, ENDOSCOPIC CLEANING PAD - 1 POUCH: Brand: FIRST STEP BEDSIDE KIT - STAND-UP POUCH, ENDOSCOPIC CLEANING PAD

## (undated) DEVICE — TROCAR: Brand: KII SLEEVE

## (undated) DEVICE — NEEDLE HYPO 23G X 1-1/2 IN

## (undated) DEVICE — KIT ENDO BUTTON

## (undated) DEVICE — INTENDED FOR TISSUE SEPARATION, AND OTHER PROCEDURES THAT REQUIRE A SHARP SURGICAL BLADE TO PUNCTURE OR CUT.: Brand: BARD-PARKER SAFETY BLADES SIZE 11, STERILE

## (undated) DEVICE — BLADELESS OBTURATOR: Brand: WECK VISTA

## (undated) DEVICE — AIR AND WATER TUBING/CAP SET FOR OLYMPUS® SCOPES: Brand: ERBE

## (undated) DEVICE — INSUFFLATION NEEDLE TO ESTABLISH PNEUMOPERITONEUM.: Brand: INSUFFLATION NEEDLE

## (undated) DEVICE — SEAL

## (undated) DEVICE — TUBING SUCTION 5MM X 12 FT

## (undated) DEVICE — TROCARS: Brand: KII® OPTICAL ACCESS SYSTEM

## (undated) DEVICE — COLUMN DRAPE

## (undated) DEVICE — DRAPE TOWEL: Brand: CONVERTORS

## (undated) DEVICE — VESSEL SEALER EXTEND: Brand: ENDOWRIST

## (undated) DEVICE — MEGA SUTURECUT ND: Brand: ENDOWRIST

## (undated) DEVICE — Device: Brand: DEFENDO AIR/WATER/SUCTION AND BIOPSY VALVE

## (undated) DEVICE — EXOFIN PRECISION PEN HIGH VISCOSITY TOPICAL SKIN ADHESIVE: Brand: EXOFIN PRECISION PEN, 1G

## (undated) DEVICE — ARM DRAPE

## (undated) DEVICE — GLOVE SRG BIOGEL ECLIPSE 7.5

## (undated) DEVICE — 40601 PROLONGED POSITIONING SYSTEM: Brand: 40601 PROLONGED POSITIONING SYSTEM

## (undated) DEVICE — CHLORAPREP HI-LITE 26ML ORANGE

## (undated) DEVICE — SUT ETHIBOND 2-0 SH/SH 36 IN X523H

## (undated) DEVICE — TRAVELKIT CONTAINS FIRST STEP KIT (200ML EP-4 KIT) AND SOILED SCOPE BAG - 1 KIT: Brand: TRAVELKIT CONTAINS FIRST STEP KIT AND SOILED SCOPE BAG

## (undated) DEVICE — PENROSE DRAIN, 18 X 3 8: Brand: CARDINAL HEALTH

## (undated) DEVICE — TIP-UP FENESTRATED GRASPER: Brand: ENDOWRIST

## (undated) DEVICE — GAUZE ROLL KITTNER

## (undated) DEVICE — SUT ETHIBOND 0 SH 30 IN X834H

## (undated) DEVICE — PAD GROUNDING DUAL ADULT

## (undated) DEVICE — CADIERE FORCEPS: Brand: ENDOWRIST

## (undated) DEVICE — SUT MONOCRYL 4-0 PS-2 18 IN Y496G

## (undated) DEVICE — VISUALIZATION SYSTEM: Brand: CLEARIFY